# Patient Record
Sex: MALE | Race: WHITE | NOT HISPANIC OR LATINO | Employment: UNEMPLOYED | URBAN - METROPOLITAN AREA
[De-identification: names, ages, dates, MRNs, and addresses within clinical notes are randomized per-mention and may not be internally consistent; named-entity substitution may affect disease eponyms.]

---
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
## 2020-01-02 ENCOUNTER — PATIENT MESSAGE (OUTPATIENT)
Dept: PEDIATRIC CARDIOLOGY | Facility: CLINIC | Age: 20
End: 2020-01-02

## 2020-01-02 RX ORDER — ATENOLOL 25 MG/1
25 TABLET ORAL 2 TIMES DAILY
Qty: 60 TABLET | Refills: 11 | Status: SHIPPED | OUTPATIENT
Start: 2020-01-02 | End: 2020-12-15 | Stop reason: SDUPTHER

## 2020-03-19 ENCOUNTER — PATIENT MESSAGE (OUTPATIENT)
Dept: PEDIATRIC CARDIOLOGY | Facility: CLINIC | Age: 20
End: 2020-03-19

## 2020-04-03 RX ORDER — PRAVASTATIN SODIUM 20 MG/1
20 TABLET ORAL DAILY
Qty: 30 TABLET | Refills: 11 | Status: SHIPPED | OUTPATIENT
Start: 2020-04-03 | End: 2020-10-30

## 2020-05-04 ENCOUNTER — PATIENT MESSAGE (OUTPATIENT)
Dept: PEDIATRIC CARDIOLOGY | Facility: CLINIC | Age: 20
End: 2020-05-04

## 2020-05-06 ENCOUNTER — CLINICAL SUPPORT (OUTPATIENT)
Dept: PEDIATRIC CARDIOLOGY | Facility: CLINIC | Age: 20
End: 2020-05-06
Payer: MEDICAID

## 2020-05-06 ENCOUNTER — PATIENT MESSAGE (OUTPATIENT)
Dept: PEDIATRIC CARDIOLOGY | Facility: CLINIC | Age: 20
End: 2020-05-06

## 2020-05-06 ENCOUNTER — OFFICE VISIT (OUTPATIENT)
Dept: PEDIATRIC CARDIOLOGY | Facility: CLINIC | Age: 20
End: 2020-05-06
Payer: MEDICAID

## 2020-05-06 VITALS
HEART RATE: 88 BPM | SYSTOLIC BLOOD PRESSURE: 120 MMHG | DIASTOLIC BLOOD PRESSURE: 63 MMHG | HEIGHT: 64 IN | BODY MASS INDEX: 27.66 KG/M2 | OXYGEN SATURATION: 97 % | WEIGHT: 162 LBS

## 2020-05-06 DIAGNOSIS — Z94.1 HEART TRANSPLANTED: ICD-10-CM

## 2020-05-06 DIAGNOSIS — Z94.1 STATUS POST HEART TRANSPLANTATION: Primary | ICD-10-CM

## 2020-05-06 DIAGNOSIS — I10 ESSENTIAL HYPERTENSION: ICD-10-CM

## 2020-05-06 PROCEDURE — 99213 OFFICE O/P EST LOW 20 MIN: CPT | Mod: 95,,, | Performed by: PEDIATRICS

## 2020-05-06 PROCEDURE — 99213 PR OFFICE/OUTPT VISIT, EST, LEVL III, 20-29 MIN: ICD-10-PCS | Mod: 95,,, | Performed by: PEDIATRICS

## 2020-05-06 NOTE — PROGRESS NOTES
The patient location is: home  The chief complaint leading to consultation is: heart transplant  Visit type: audiovisual  Total time spent with patient: 15 minutes  Each patient to whom he or she provides medical services by telemedicine is:  (1) informed of the relationship between the physician and patient and the respective role of any other health care provider with respect to management of the patient; and (2) notified that he or she may decline to receive medical services by telemedicine and may withdraw from such care at any time.    Notes:   Wilmer Aponte is a 19 y.o. male with the following diagnoses:  1. s/p OHT secondary to DCM (donor CMV+/recipient -) 9/29/14.  BiVAD [place] support for 6 days before transplant.  ECMO for 1 day prior to [place].              - clotrimazole devang to help stabilize tacro levels in the past, but stopped due to noncompliance              - typically get labs drawn at [facility]  2. Rejection history:              - 2R treated with steroid pulse 10/23/14 followed by repeat 2R treated with ATGAM and 3 days of solumedrol 11/12/14              - 1R on biopsies 1/8/15, 2/23/15, 4/13/15, 10/22/15, 8/2/16              - Grade 0 biopsy with normal coronary angiography March 2017, March 2019  3. hypertension  4. History of ventricular ectopy (NSVT) 10/15/14  5. Extreme anxiety regarding oral medications with oral aversion, hiding medications, and poor hygiene - much improved  6. PEG tube now removed  7. Hyperlipidemia - much improved back on pravastatin  8. EBV PCR negative 12/2018, CMV negative 3/2019    Plan:  1.  The patient needs repeat blood work.  CBC, CMP, tacrolimus, mycophenolic acid, fasting lipid profile, EBV and CMV PCR, hemoglobin A1c.  I would also like to check a vitamin-D level.  They will get them drawn next month at [facility].  2.  Patient needs a yearly dermatology evaluation.  3.  Patient needs to see the dentist regularly.  4.  Provided the blood  work looks good, I can see him again in my [place] clinic in about 4 months.  5.  Plan repeat cardiac catheterization with biopsy and coronary angiography around March 2021.    Discussion:  Overall, he looks great.  I recommended increased fluid intake.  His heart looks good.  I understand his mother's hesitancy to take him for blood work, but it has been 6 months.  We compromised.  She will get it drawn next month.    Interval history:  I last saw him in November 2019.  He has done well from a cardiac standpoint since that time.  No chest pain, palpitations, syncope, shortness of breath, cyanosis, or edema.  No fever.  No lymphadenopathy.  No rashes.  No diarrhea.  He did try to get a part-time job.  However, after standing for a few hours, he would feel weak and dizzy.  He would have a lot of knee pain.    He had vital signs today at the time of his echocardiogram.  Blood pressure 123/63.  Weight 73 kg.  Height 173 cm.    I personally reviewed all the images from his echocardiogram today.  Excellent biventricular function is noted.  There is no significant mitral insufficiency.  There is no pericardial effusion.  The echo was unchanged.    He had blood work November 2019.  MPA level 2.  Tacrolimus level 8.3.  His hemoglobin was 18.  His creatinine was 0.9.  In July 2019, total cholesterol was 166 with HDL 45, .    Sincerely,        Morteza Renae MD  Pediatric Cardiology  Adult Congenital Heart Disease  Pediatric Heart Failure and Transplantation  [facility] [address] [place]  [address]  [phone]

## 2020-07-06 ENCOUNTER — HISTORICAL (OUTPATIENT)
Dept: ADMINISTRATIVE | Facility: HOSPITAL | Age: 20
End: 2020-07-06

## 2020-07-06 ENCOUNTER — TELEPHONE (OUTPATIENT)
Dept: PEDIATRIC CARDIOLOGY | Facility: CLINIC | Age: 20
End: 2020-07-06

## 2020-07-06 LAB
ALBUMIN SERPL-MCNC: 4.4 GM/DL (ref 3.5–5)
ALBUMIN/GLOB SERPL: 1.3 RATIO (ref 1.1–2)
ALP SERPL-CCNC: 167 UNIT/L
ALT SERPL-CCNC: 41 UNIT/L (ref 0–55)
AST SERPL-CCNC: 25 UNIT/L (ref 5–34)
BILIRUB SERPL-MCNC: 1.1 MG/DL (ref 0.2–1.2)
BILIRUBIN DIRECT+TOT PNL SERPL-MCNC: 0.4 MG/DL (ref 0–0.5)
BILIRUBIN DIRECT+TOT PNL SERPL-MCNC: 0.7 MG/DL (ref 0–0.8)
BUN SERPL-MCNC: 11.3 MG/DL (ref 8.9–20.6)
CALCIUM SERPL-MCNC: 9.7 MG/DL (ref 8.4–10.2)
CHLORIDE SERPL-SCNC: 102 MMOL/L (ref 98–107)
CHOLEST SERPL-MCNC: 164 MG/DL
CHOLEST/HDLC SERPL: 4 {RATIO} (ref 0–5)
CO2 SERPL-SCNC: 25 MMOL/L (ref 22–29)
CREAT SERPL-MCNC: 0.8 MG/DL (ref 0.72–1.25)
DEPRECATED CALCIDIOL+CALCIFEROL SERPL-MC: 6.4 NG/ML (ref 6.6–49.9)
EST. AVERAGE GLUCOSE BLD GHB EST-MCNC: <96.8 MG/DL
GLOBULIN SER-MCNC: 3.5 GM/DL (ref 2.4–3.5)
GLUCOSE SERPL-MCNC: 98 MG/DL (ref 74–100)
HBA1C MFR BLD: <5 %
HDLC SERPL-MCNC: 41 MG/DL (ref 40–60)
LDLC SERPL CALC-MCNC: 95 MG/DL (ref 50–140)
POTASSIUM SERPL-SCNC: 4.5 MMOL/L (ref 3.5–5.1)
PROT SERPL-MCNC: 7.9 GM/DL (ref 6.4–8.3)
SODIUM SERPL-SCNC: 137 MMOL/L (ref 136–145)
TRIGL SERPL-MCNC: 142 MG/DL (ref 0–150)
VLDLC SERPL CALC-MCNC: 28 MG/DL

## 2020-07-16 ENCOUNTER — TELEPHONE (OUTPATIENT)
Dept: PEDIATRIC CARDIOLOGY | Facility: CLINIC | Age: 20
End: 2020-07-16

## 2020-07-16 DIAGNOSIS — E55.9 VITAMIN D DEFICIENCY: Primary | ICD-10-CM

## 2020-07-16 RX ORDER — VIT C/E/ZN/COPPR/LUTEIN/ZEAXAN 250MG-90MG
1000 CAPSULE ORAL DAILY
Qty: 30 CAPSULE | Refills: 11 | Status: ON HOLD | OUTPATIENT
Start: 2020-07-16 | End: 2021-07-19 | Stop reason: SDUPTHER

## 2020-07-16 NOTE — TELEPHONE ENCOUNTER
I attempted to call mom.  No answer.  I have communicated with her via my chart.  Blood work was performed July 6, 2020.  To summarize:  1.  Tacrolimus level 8.4, MPA 1.5.  2.  CMV PCR negative.  3.  Normal hemoglobin A1c less than 5  4.  Reassuring lipid values with total cholesterol 161, HDL 41, LDL 95, triglycerides 142.  5.  Normal comprehensive metabolic panel with sodium 137, potassium 4.5, chloride 102, CO2 25, BUN 11, creatinine 0.8, glucose 98.  Bili 1.1.  Albumin 4.4.  AST 25, ALT 41.  6.  Low vitamin D level at 6.4.    Will start vitamin D supplement.

## 2020-09-10 ENCOUNTER — TELEPHONE (OUTPATIENT)
Dept: PEDIATRIC CARDIOLOGY | Facility: CLINIC | Age: 20
End: 2020-09-10

## 2020-10-06 DIAGNOSIS — Z94.1 HEART TRANSPLANTED: Primary | ICD-10-CM

## 2020-10-07 ENCOUNTER — PATIENT MESSAGE (OUTPATIENT)
Dept: PEDIATRIC CARDIOLOGY | Facility: CLINIC | Age: 20
End: 2020-10-07

## 2020-10-08 ENCOUNTER — TELEPHONE (OUTPATIENT)
Dept: PEDIATRIC CARDIOLOGY | Facility: CLINIC | Age: 20
End: 2020-10-08

## 2020-10-08 NOTE — TELEPHONE ENCOUNTER
Patient will see Dr. Renae at 1:30 ( ekg/vs) will get echo after visit at 2:30 if the weather permits. Mom verbalized understanding all information provided.

## 2020-10-11 NOTE — PROGRESS NOTES
10/11/2020    re:Wilmer Aponte  :2000    Ugo Pratt MD  [address] [place] [address]    Dr. Gregg Pugh  [facility]    Pediatric Cardiology Note    Wilmer Aponte is a 20 y.o. male seen today in my pediatric cardiology clinic due to heart transplant.  He is seen in follow up with the following diagnoses:  1. 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  BiVAD [place] support for 6 days before transplant.  ECMO for 1 day prior to [place].   - clotrimazole devang to help stabilize tacro levels in the past, but stopped due to noncompliance   - typically get labs drawn at [facility]  2. Rejection history:   - 2R treated with steroid pulse 10/23/14 followed by repeat 2R treated with ATGAM and 3 days of solumedrol 14   - 1R on biopsies 1/8/15, 2/23/15, 4/13/15, 10/22/15, 16   - Grade 0 biopsy with normal coronary angiography 2017, 2019  3. hypertension  4. History of ventricular ectopy (NSVT) 10/15/14  5. Extreme anxiety regarding oral medications with oral aversion, hiding medications, and poor hygiene - much improved  6. PEG tube now removed  7. Hyperlipidemia - much improved back on pravastatin  8. EBV PCR negative 2018, CMV negative 3/2019  9.     Plan:  1.  Follow-up in 4 months with echo, EKG, labs.  I will see him in [place].    2.  Check MPA and tacrolimus along with fasting lipid levels on Thursday.  3.  Continue pravastatin at current dosing.  4.  No need for endocarditis prophylaxis.  5.  I stressed the need for a healthy diet and regular exercise.  6.  Dermatology appointment yearly.  Dentist every 6 months.  7.  Follow up in 1 week with PCP if cough not better    Discussion:  He looks great clinically.  I will see him again in 4 months.  He is gaining weight, and he needs to eat healthy and exercise.  He does have a cough.  There is no evidence of bacterial infection and his heart looks great.    Interval history:  I saw him  via a virtual visit in July 2020.  I reviewed an echo performed at that time, and it looked great.  Patient was doing well.  Blood work revealed a low vitamin-D level, and he was started on vitamin-D supplement.  Blood work was performed July 6, 2020.  To summarize:  1.  Tacrolimus level 8.4, MPA 1.5.  2.  CMV PCR negative.  3.  Normal hemoglobin A1c less than 5  4.  Reassuring lipid values with total cholesterol 161, HDL 41, LDL 95, triglycerides 142.  5.  Normal comprehensive metabolic panel with sodium 137, potassium 4.5, chloride 102, CO2 25, BUN 11, creatinine 0.8, glucose 98.  Bili 1.1.  Albumin 4.4.  AST 25, ALT 41.  6.  Low vitamin D level at 6.4.    He was seen in the emergency room September 11, 2020 secondary to a cough and upper respiratory congestion.  He was diagnosed with an upper respiratory infection limb and tonsillitis.  He was treated with albuterol and amoxicillin along with a methylprednisolone dose pack.  He felt better, but the cough started back again yesterday.  No fever.  No sore throat.  Nasal congestion.  No diarrhea or vomiting.    The review of systems is as noted above. It is otherwise negative for other symptoms related to the general, neurological, psychiatric, endocrine, gastrointestinal, genitourinary, respiratory, dermatologic, musculoskeletal, hematologic, and immunologic systems.    Past Medical History:   Diagnosis Date    Anxiety     Dilated cardiomyopathy     15 y/o    Heart transplanted     9/29/14 at [place].  Donor CMV+/R-.  [place] BiVAD 9/23-29.    Hypertension     Oral aversion     Status post orthotopic heart transplant      Past Surgical History:   Procedure Laterality Date    EXTRACORPOREAL CIRCULATION      GASTROSTOMY TUBE PLACEMENT      HEART TRANSPLANT      LEFT VENTRICULAR ASSIST DEVICE       Family History   Problem Relation Age of Onset    Diabetes type II Mother     Dilated cardiomyopathy Father     Dilated cardiomyopathy Paternal Uncle      Congenital heart disease Neg Hx     Early death Neg Hx      Social History     Socioeconomic History    Marital status: Single     Spouse name: Not on file    Number of children: Not on file    Years of education: Not on file    Highest education level: Not on file   Occupational History    Not on file   Social Needs    Financial resource strain: Not on file    Food insecurity     Worry: Not on file     Inability: Not on file    Transportation needs     Medical: Not on file     Non-medical: Not on file   Tobacco Use    Smoking status: Never Smoker   Substance and Sexual Activity    Alcohol use: No    Drug use: No    Sexual activity: Never   Lifestyle    Physical activity     Days per week: Not on file     Minutes per session: Not on file    Stress: Not on file   Relationships    Social connections     Talks on phone: Not on file     Gets together: Not on file     Attends Anabaptism service: Not on file     Active member of club or organization: Not on file     Attends meetings of clubs or organizations: Not on file     Relationship status: Not on file   Other Topics Concern    Not on file   Social History Narrative    Lives with family in [facility] [place].     Current Outpatient Medications on File Prior to Visit   Medication Sig Dispense Refill    aspirin 81 MG Chew Take 1 tablet (81 mg total) by mouth once daily. 30 tablet 11    atenolol (TENORMIN) 25 MG tablet Take 1 tablet (25 mg total) by mouth 2 (two) times daily. 60 tablet 11    cholecalciferol, vitamin D3, (VITAMIN D3) 25 mcg (1,000 unit) capsule Take 1 capsule (1,000 Units total) by mouth once daily. 30 capsule 11    mycophenolate (CELLCEPT) 250 mg Cap Take 3 capsules (750 mg total) by mouth 2 (two) times daily. 180 capsule 11    pravastatin (PRAVACHOL) 20 MG tablet Take 1 tablet (20 mg total) by mouth once daily. 30 tablet 11    tacrolimus (PROGRAF) 0.5 MG Cap Take 1 capsule (0.5 mg total) by mouth every 12 (twelve) hours. Take with two  "1mg capsules for total dose of 2.5mg every 12 hours 60 capsule 11    tacrolimus (PROGRAF) 1 MG Cap TAKE 2 CAPSULES BY MOUTH (2 MG. TOTAL) EVERY 12 HOURS 120 capsule 11    atenolol 2 mg/mL oral suspension Take 12.5 mLs (25 mg total) by mouth 2 (two) times daily. 12.5 mls bid (Patient not taking: Reported on 11/11/2019) 150 mL 11    mycophenolate mofetil (CELLCEPT) 200 mg/mL SusR Take 3.75 mLs (750 mg total) by mouth 2 (two) times daily. 3.75MLS BID (Patient not taking: Reported on 11/11/2019) 250 mL 6    ORA-BLEND SF Susp        No current facility-administered medications on file prior to visit.      Review of patient's allergies indicates:   Allergen Reactions    Clindamycin Anaphylaxis         Vitals:    10/12/20 1307   BP: 128/77   BP Location: Right arm   Patient Position: Sitting   BP Method: Medium (Automatic)   Pulse: 90   Resp: 20   SpO2: 97%   Weight: 78.5 kg (173 lb)   Height: 5' 3.78" (1.62 m)       In general, he is an overweight, nondysmorphic male in no apparent distress.  The eyes, nares, and oropharynx are clear.  Eyelids and conjunctiva are normal without drainage or erythema.  2+ tonsils without exudate or erythema.  No obvious dental caries.  Pupils equal and round bilaterally.  The head is normocephalic and atraumatic.  The neck is supple without jugular venous distention or thyroid enlargement.  The lungs are clear to auscultation bilaterally.  There is a well healed median sternotomy.  The first heart sound is normal and the second is fixed and split.  There are no murmurs, gallops, rubs, or clicks in the supine or standing position.  The abdominal exam is benign without hepatosplenomegaly, tenderness, or distention.  The old G tube site is very well healed with no evidence of infection.  Pulses are normal in all 4 extremities with brisk capillary refill and no clubbing, cyanosis, or edema.  No rashes are noted.    I personally reviewed the following tests performed today and my " interpretation follows:  EKG in clinic today is basically normal except for right atrial enlargement.  He has an RSR prime pattern in the right precordial leads that is unchanged.    Echo:  s/p Cardiac transplant.  1. Normal intracardiac anatomy .  2. No mitral insufficiency or pericardial effusion.  3. Normal biventricular size and systolic function.  LV EF estimated at 65%.  4. Normal parameters of left ventricular diastolic function.    I reviewed blood work performed September 11, 2020:  1.  BNP 16, troponin 0.01, creatine phosphokinase mildly elevated at 343  2.  CBC with white count 9.4, hemoglobin 17.7, hematocrit 50.5, platelet count 251.  Normal MCV.  Differential normal.  3.  Comprehensive metabolic panel with sodium 136, potassium 3.8, CO2 21, BUN 7, creatinine 1.1, glucose mildly elevated at 139.  Albumin 4.2.  Total protein 7.4.  Bilirubin 0.6.  AST 27, ALT 37, alkaline phosphatase mildly elevated at 155.    A chest x-ray November 11, 2020 was normal.    In the past, I reviewed extensive medical records from [place].      Sincerely,        Morteza Renae MD  Pediatric Cardiology  Adult Congenital Heart Disease  Pediatric Heart Failure and Transplantation  [facility]  [address] [place]  [address]  [phone]

## 2020-10-12 ENCOUNTER — CLINICAL SUPPORT (OUTPATIENT)
Dept: PEDIATRIC CARDIOLOGY | Facility: CLINIC | Age: 20
End: 2020-10-12
Payer: MEDICAID

## 2020-10-12 ENCOUNTER — OFFICE VISIT (OUTPATIENT)
Dept: PEDIATRIC CARDIOLOGY | Facility: CLINIC | Age: 20
End: 2020-10-12
Payer: MEDICAID

## 2020-10-12 VITALS
RESPIRATION RATE: 20 BRPM | HEART RATE: 90 BPM | SYSTOLIC BLOOD PRESSURE: 128 MMHG | HEIGHT: 64 IN | BODY MASS INDEX: 29.53 KG/M2 | DIASTOLIC BLOOD PRESSURE: 77 MMHG | OXYGEN SATURATION: 97 % | WEIGHT: 173 LBS

## 2020-10-12 DIAGNOSIS — Z94.1 HEART TRANSPLANTED: ICD-10-CM

## 2020-10-12 DIAGNOSIS — I10 ESSENTIAL HYPERTENSION: Primary | ICD-10-CM

## 2020-10-12 PROCEDURE — 93000 ELECTROCARDIOGRAM COMPLETE: CPT | Mod: S$GLB,,, | Performed by: PEDIATRICS

## 2020-10-12 PROCEDURE — 99214 PR OFFICE/OUTPT VISIT, EST, LEVL IV, 30-39 MIN: ICD-10-PCS | Mod: 25,S$GLB,, | Performed by: PEDIATRICS

## 2020-10-12 PROCEDURE — 93000 EKG 12-LEAD PEDIATRIC: ICD-10-PCS | Mod: S$GLB,,, | Performed by: PEDIATRICS

## 2020-10-12 PROCEDURE — 99214 OFFICE O/P EST MOD 30 MIN: CPT | Mod: 25,S$GLB,, | Performed by: PEDIATRICS

## 2020-10-19 ENCOUNTER — HISTORICAL (OUTPATIENT)
Dept: LAB | Facility: HOSPITAL | Age: 20
End: 2020-10-19

## 2020-10-19 LAB
CHOLEST SERPL-MCNC: 193 MG/DL
CHOLEST/HDLC SERPL: 5 {RATIO} (ref 0–5)
HDLC SERPL-MCNC: 41 MG/DL (ref 40–60)
LDLC SERPL CALC-MCNC: 110 MG/DL (ref 50–140)
TRIGL SERPL-MCNC: 210 MG/DL (ref 0–150)
VLDLC SERPL CALC-MCNC: 42 MG/DL

## 2020-10-30 ENCOUNTER — PATIENT MESSAGE (OUTPATIENT)
Dept: PEDIATRIC CARDIOLOGY | Facility: CLINIC | Age: 20
End: 2020-10-30

## 2020-10-30 ENCOUNTER — TELEPHONE (OUTPATIENT)
Dept: PEDIATRIC CARDIOLOGY | Facility: CLINIC | Age: 20
End: 2020-10-30

## 2020-10-30 RX ORDER — MYCOPHENOLATE MOFETIL 250 MG/1
1000 CAPSULE ORAL 2 TIMES DAILY
Qty: 240 CAPSULE | Refills: 11 | Status: SHIPPED | OUTPATIENT
Start: 2020-10-30 | End: 2021-03-10

## 2020-10-30 RX ORDER — PRAVASTATIN SODIUM 40 MG/1
40 TABLET ORAL DAILY
Qty: 30 TABLET | Refills: 11 | Status: ON HOLD | OUTPATIENT
Start: 2020-10-30 | End: 2021-06-25 | Stop reason: SDUPTHER

## 2020-10-30 NOTE — TELEPHONE ENCOUNTER
Patient had blood work performed October 19, 2000 8:34 a.m..  Total cholesterol 193.  HDL 41, .  Triglycerides 210.  Tacrolimus level 7.6.  Mycophenolic acid level 0.6.  Plan:  1.  Increase CellCept dose to 1000 mg twice a day  2.  Increase pravastatin dose to 40 mg daily    I spoke with the mother.  She understands the plan.

## 2020-12-15 RX ORDER — ATENOLOL 25 MG/1
25 TABLET ORAL 2 TIMES DAILY
Qty: 60 TABLET | Refills: 11 | Status: ON HOLD | OUTPATIENT
Start: 2020-12-15 | End: 2021-06-25 | Stop reason: HOSPADM

## 2021-01-08 ENCOUNTER — PATIENT MESSAGE (OUTPATIENT)
Dept: TRANSPLANT | Facility: CLINIC | Age: 21
End: 2021-01-08

## 2021-02-24 ENCOUNTER — CLINICAL SUPPORT (OUTPATIENT)
Dept: PEDIATRIC CARDIOLOGY | Facility: CLINIC | Age: 21
End: 2021-02-24
Payer: MEDICAID

## 2021-02-24 VITALS
SYSTOLIC BLOOD PRESSURE: 124 MMHG | OXYGEN SATURATION: 99 % | WEIGHT: 164.88 LBS | DIASTOLIC BLOOD PRESSURE: 73 MMHG | RESPIRATION RATE: 18 BRPM | BODY MASS INDEX: 28.15 KG/M2 | HEART RATE: 103 BPM | HEIGHT: 64 IN

## 2021-02-24 DIAGNOSIS — Z94.1 HEART TRANSPLANTED: ICD-10-CM

## 2021-02-24 DIAGNOSIS — Z94.1 HEART TRANSPLANTED: Primary | ICD-10-CM

## 2021-03-03 ENCOUNTER — HISTORICAL (OUTPATIENT)
Dept: LAB | Facility: HOSPITAL | Age: 21
End: 2021-03-03

## 2021-03-03 DIAGNOSIS — Z94.1 HEART TRANSPLANTED: Primary | ICD-10-CM

## 2021-03-03 LAB
ALBUMIN SERPL-MCNC: 4.4 GM/DL (ref 3.5–5)
ALBUMIN/GLOB SERPL: 1.2 RATIO (ref 1.1–2)
ALP SERPL-CCNC: 143 UNIT/L (ref 40–150)
ALT SERPL-CCNC: 32 UNIT/L (ref 0–55)
AST SERPL-CCNC: 24 UNIT/L (ref 5–34)
BILIRUB SERPL-MCNC: 1.2 MG/DL (ref 0.2–1.2)
BILIRUBIN DIRECT+TOT PNL SERPL-MCNC: 0.4 MG/DL (ref 0–0.5)
BILIRUBIN DIRECT+TOT PNL SERPL-MCNC: 0.8 MG/DL (ref 0–0.8)
BUN SERPL-MCNC: 13 MG/DL (ref 8.9–20.6)
CALCIUM SERPL-MCNC: 9.5 MG/DL (ref 8.4–10.2)
CHLORIDE SERPL-SCNC: 102 MMOL/L (ref 98–107)
CHOLEST SERPL-MCNC: 168 MG/DL
CHOLEST/HDLC SERPL: 5 {RATIO} (ref 0–5)
CO2 SERPL-SCNC: 23 MMOL/L (ref 22–29)
CREAT SERPL-MCNC: 0.85 MG/DL (ref 0.72–1.25)
GLOBULIN SER-MCNC: 3.6 GM/DL (ref 2.4–3.5)
GLUCOSE SERPL-MCNC: 94 MG/DL (ref 74–100)
HDLC SERPL-MCNC: 35 MG/DL (ref 40–60)
LDLC SERPL CALC-MCNC: 105 MG/DL (ref 50–140)
POTASSIUM SERPL-SCNC: 3.9 MMOL/L (ref 3.5–5.1)
PROT SERPL-MCNC: 8 GM/DL (ref 6.4–8.3)
SODIUM SERPL-SCNC: 135 MMOL/L (ref 136–145)
TRIGL SERPL-MCNC: 142 MG/DL (ref 0–150)
VLDLC SERPL CALC-MCNC: 28 MG/DL

## 2021-03-04 DIAGNOSIS — Z94.1 HEART TRANSPLANTED: Primary | ICD-10-CM

## 2021-03-08 ENCOUNTER — CLINICAL SUPPORT (OUTPATIENT)
Dept: PEDIATRIC CARDIOLOGY | Facility: CLINIC | Age: 21
End: 2021-03-08
Attending: PEDIATRICS
Payer: MEDICAID

## 2021-03-08 ENCOUNTER — OFFICE VISIT (OUTPATIENT)
Dept: PEDIATRIC CARDIOLOGY | Facility: CLINIC | Age: 21
End: 2021-03-08
Payer: MEDICAID

## 2021-03-08 VITALS
HEIGHT: 64 IN | DIASTOLIC BLOOD PRESSURE: 81 MMHG | HEART RATE: 86 BPM | SYSTOLIC BLOOD PRESSURE: 125 MMHG | WEIGHT: 164.88 LBS | OXYGEN SATURATION: 97 % | RESPIRATION RATE: 18 BRPM | BODY MASS INDEX: 28.15 KG/M2

## 2021-03-08 DIAGNOSIS — I10 ESSENTIAL HYPERTENSION: Primary | ICD-10-CM

## 2021-03-08 DIAGNOSIS — R00.2 PALPITATIONS: ICD-10-CM

## 2021-03-08 DIAGNOSIS — Z94.1 HEART TRANSPLANTED: ICD-10-CM

## 2021-03-08 DIAGNOSIS — E78.49 OTHER HYPERLIPIDEMIA: ICD-10-CM

## 2021-03-08 PROCEDURE — 93000 EKG 12-LEAD PEDIATRIC: ICD-10-PCS | Mod: S$GLB,,, | Performed by: PEDIATRICS

## 2021-03-08 PROCEDURE — 99214 PR OFFICE/OUTPT VISIT, EST, LEVL IV, 30-39 MIN: ICD-10-PCS | Mod: 25,S$GLB,, | Performed by: PEDIATRICS

## 2021-03-08 PROCEDURE — 99214 OFFICE O/P EST MOD 30 MIN: CPT | Mod: 25,S$GLB,, | Performed by: PEDIATRICS

## 2021-03-08 PROCEDURE — 93000 ELECTROCARDIOGRAM COMPLETE: CPT | Mod: S$GLB,,, | Performed by: PEDIATRICS

## 2021-03-09 ENCOUNTER — TELEPHONE (OUTPATIENT)
Dept: PEDIATRIC CARDIOLOGY | Facility: CLINIC | Age: 21
End: 2021-03-09

## 2021-03-09 ENCOUNTER — PATIENT MESSAGE (OUTPATIENT)
Dept: PEDIATRIC CARDIOLOGY | Facility: CLINIC | Age: 21
End: 2021-03-09

## 2021-03-10 ENCOUNTER — TELEPHONE (OUTPATIENT)
Dept: PEDIATRIC CARDIOLOGY | Facility: CLINIC | Age: 21
End: 2021-03-10

## 2021-03-10 RX ORDER — MYCOPHENOLATE MOFETIL 250 MG/1
1500 CAPSULE ORAL 2 TIMES DAILY
Qty: 240 CAPSULE | Refills: 11 | Status: ON HOLD | OUTPATIENT
Start: 2021-03-10 | End: 2021-06-21 | Stop reason: HOSPADM

## 2021-03-24 ENCOUNTER — PATIENT MESSAGE (OUTPATIENT)
Dept: PEDIATRIC CARDIOLOGY | Facility: CLINIC | Age: 21
End: 2021-03-24

## 2021-04-18 ENCOUNTER — PATIENT MESSAGE (OUTPATIENT)
Dept: MEDSURG UNIT | Facility: HOSPITAL | Age: 21
End: 2021-04-18

## 2021-04-18 ENCOUNTER — PATIENT MESSAGE (OUTPATIENT)
Dept: PEDIATRIC CARDIOLOGY | Facility: CLINIC | Age: 21
End: 2021-04-18

## 2021-04-19 ENCOUNTER — PATIENT MESSAGE (OUTPATIENT)
Dept: PEDIATRIC CARDIOLOGY | Facility: CLINIC | Age: 21
End: 2021-04-19

## 2021-04-21 ENCOUNTER — PATIENT MESSAGE (OUTPATIENT)
Dept: PEDIATRIC CARDIOLOGY | Facility: CLINIC | Age: 21
End: 2021-04-21

## 2021-04-22 ENCOUNTER — HOSPITAL ENCOUNTER (OUTPATIENT)
Facility: HOSPITAL | Age: 21
Discharge: HOME | End: 2021-04-22
Attending: PEDIATRICS | Admitting: PEDIATRICS
Payer: MEDICAID

## 2021-04-22 ENCOUNTER — ANESTHESIA EVENT (OUTPATIENT)
Dept: MEDSURG UNIT | Facility: HOSPITAL | Age: 21
End: 2021-04-22
Payer: MEDICAID

## 2021-04-22 ENCOUNTER — ANESTHESIA (OUTPATIENT)
Dept: MEDSURG UNIT | Facility: HOSPITAL | Age: 21
End: 2021-04-22
Payer: MEDICAID

## 2021-04-22 VITALS
WEIGHT: 165 LBS | TEMPERATURE: 98 F | HEIGHT: 63 IN | RESPIRATION RATE: 20 BRPM | BODY MASS INDEX: 29.23 KG/M2 | HEART RATE: 115 BPM | DIASTOLIC BLOOD PRESSURE: 70 MMHG | SYSTOLIC BLOOD PRESSURE: 125 MMHG | OXYGEN SATURATION: 96 %

## 2021-04-22 DIAGNOSIS — Z94.1 S/P ORTHOTOPIC HEART TRANSPLANT: ICD-10-CM

## 2021-04-22 DIAGNOSIS — Z94.89 TRANSPLANT RECIPIENT: ICD-10-CM

## 2021-04-22 DIAGNOSIS — Z94.1 HEART TRANSPLANTED: Primary | ICD-10-CM

## 2021-04-22 LAB
25(OH)D3+25(OH)D2 SERPL-MCNC: 8 NG/ML (ref 30–96)
ABO + RH BLD: NORMAL
BASOPHILS # BLD AUTO: 0.03 K/UL (ref 0–0.2)
BASOPHILS NFR BLD: 0.5 % (ref 0–1.9)
BLD GP AB SCN CELLS X3 SERPL QL: NORMAL
DIFFERENTIAL METHOD: ABNORMAL
EOSINOPHIL # BLD AUTO: 0.4 K/UL (ref 0–0.5)
EOSINOPHIL NFR BLD: 5.7 % (ref 0–8)
ERYTHROCYTE [DISTWIDTH] IN BLOOD BY AUTOMATED COUNT: 12.8 % (ref 11.5–14.5)
ESTIMATED AVG GLUCOSE: 100 MG/DL (ref 68–131)
HBA1C MFR BLD: 5.1 % (ref 4–5.6)
HCT VFR BLD AUTO: 49.4 % (ref 40–54)
HGB BLD-MCNC: 17 G/DL (ref 14–18)
IMM GRANULOCYTES # BLD AUTO: 0.01 K/UL (ref 0–0.04)
IMM GRANULOCYTES NFR BLD AUTO: 0.2 % (ref 0–0.5)
LYMPHOCYTES # BLD AUTO: 2 K/UL (ref 1–4.8)
LYMPHOCYTES NFR BLD: 30.5 % (ref 18–48)
MCH RBC QN AUTO: 27.9 PG (ref 27–31)
MCHC RBC AUTO-ENTMCNC: 34.4 G/DL (ref 32–36)
MCV RBC AUTO: 81 FL (ref 82–98)
MONOCYTES # BLD AUTO: 0.7 K/UL (ref 0.3–1)
MONOCYTES NFR BLD: 10.4 % (ref 4–15)
NEUTROPHILS # BLD AUTO: 3.4 K/UL (ref 1.8–7.7)
NEUTROPHILS NFR BLD: 52.7 % (ref 38–73)
NRBC BLD-RTO: 0 /100 WBC
PLATELET # BLD AUTO: 195 K/UL (ref 150–450)
PMV BLD AUTO: 10.7 FL (ref 9.2–12.9)
RBC # BLD AUTO: 6.1 M/UL (ref 4.6–6.2)
SARS-COV-2 RDRP RESP QL NAA+PROBE: NEGATIVE
WBC # BLD AUTO: 6.46 K/UL (ref 3.9–12.7)

## 2021-04-22 PROCEDURE — 93505 ENDOMYOCARDIAL BIOPSY: CPT | Performed by: PEDIATRICS

## 2021-04-22 PROCEDURE — 37000009 HC ANESTHESIA EA ADD 15 MINS: Performed by: PEDIATRICS

## 2021-04-22 PROCEDURE — D9220A PRA ANESTHESIA: ICD-10-PCS | Mod: ANES,,, | Performed by: ANESTHESIOLOGY

## 2021-04-22 PROCEDURE — 63600175 PHARM REV CODE 636 W HCPCS: Performed by: NURSE ANESTHETIST, CERTIFIED REGISTERED

## 2021-04-22 PROCEDURE — 93304 ECHO TRANSTHORACIC: CPT | Performed by: PEDIATRICS

## 2021-04-22 PROCEDURE — 82947 ASSAY GLUCOSE BLOOD QUANT: CPT | Performed by: PEDIATRICS

## 2021-04-22 PROCEDURE — 82306 VITAMIN D 25 HYDROXY: CPT | Performed by: PEDIATRICS

## 2021-04-22 PROCEDURE — 87799 DETECT AGENT NOS DNA QUANT: CPT | Performed by: PEDIATRICS

## 2021-04-22 PROCEDURE — 82330 ASSAY OF CALCIUM: CPT | Performed by: PEDIATRICS

## 2021-04-22 PROCEDURE — 01922 ANES N-INVAS IMG/RADJ THER: CPT | Performed by: PEDIATRICS

## 2021-04-22 PROCEDURE — 88346 IMFLUOR 1ST 1ANTB STAIN PX: CPT | Mod: 26,,, | Performed by: PATHOLOGY

## 2021-04-22 PROCEDURE — 36415 COLL VENOUS BLD VENIPUNCTURE: CPT | Performed by: PEDIATRICS

## 2021-04-22 PROCEDURE — C1894 INTRO/SHEATH, NON-LASER: HCPCS | Performed by: PEDIATRICS

## 2021-04-22 PROCEDURE — 93505 PR BIOPSY OF HEART LINING: ICD-10-PCS | Mod: 26,51,, | Performed by: PEDIATRICS

## 2021-04-22 PROCEDURE — 93460 R&L HRT ART/VENTRICLE ANGIO: CPT | Mod: 26,,, | Performed by: PEDIATRICS

## 2021-04-22 PROCEDURE — 88342 IMHCHEM/IMCYTCHM 1ST ANTB: CPT | Mod: 26,,, | Performed by: PATHOLOGY

## 2021-04-22 PROCEDURE — D9220A PRA ANESTHESIA: Mod: ANES,,, | Performed by: ANESTHESIOLOGY

## 2021-04-22 PROCEDURE — 82805 BLOOD GASES W/O2 SATURATION: CPT | Performed by: PEDIATRICS

## 2021-04-22 PROCEDURE — 25000003 PHARM REV CODE 250: Performed by: PEDIATRICS

## 2021-04-22 PROCEDURE — U0002 COVID-19 LAB TEST NON-CDC: HCPCS | Performed by: PEDIATRICS

## 2021-04-22 PROCEDURE — 88307 TISSUE EXAM BY PATHOLOGIST: CPT | Mod: 26,,, | Performed by: PATHOLOGY

## 2021-04-22 PROCEDURE — 99499 UNLISTED E&M SERVICE: CPT | Mod: ,,, | Performed by: PEDIATRICS

## 2021-04-22 PROCEDURE — D9220A PRA ANESTHESIA: Mod: CRNA,,, | Performed by: NURSE ANESTHETIST, CERTIFIED REGISTERED

## 2021-04-22 PROCEDURE — 93460 PR CATH PLACE/CORON ANGIO, IMG SUPER/INTERP,R&L HRT CATH, L HRT VENTRIC: ICD-10-PCS | Mod: 26,,, | Performed by: PEDIATRICS

## 2021-04-22 PROCEDURE — 88346 IMFLUOR 1ST 1ANTB STAIN PX: CPT | Performed by: PATHOLOGY

## 2021-04-22 PROCEDURE — 27201423 OPTIME MED/SURG SUP & DEVICES STERILE SUPPLY: Performed by: PEDIATRICS

## 2021-04-22 PROCEDURE — 88307 TISSUE EXAM BY PATHOLOGIST: CPT | Performed by: PATHOLOGY

## 2021-04-22 PROCEDURE — 93325 DOPPLER ECHO COLOR FLOW MAPG: CPT | Performed by: PEDIATRICS

## 2021-04-22 PROCEDURE — 93010 ELECTROCARDIOGRAM REPORT: CPT | Mod: ,,, | Performed by: PEDIATRICS

## 2021-04-22 PROCEDURE — 93010 EKG 12-LEAD: ICD-10-PCS | Mod: ,,, | Performed by: PEDIATRICS

## 2021-04-22 PROCEDURE — D9220A PRA ANESTHESIA: ICD-10-PCS | Mod: CRNA,,, | Performed by: NURSE ANESTHETIST, CERTIFIED REGISTERED

## 2021-04-22 PROCEDURE — 99499 NO LOS: ICD-10-PCS | Mod: ,,, | Performed by: PEDIATRICS

## 2021-04-22 PROCEDURE — C1751 CATH, INF, PER/CENT/MIDLINE: HCPCS | Performed by: PEDIATRICS

## 2021-04-22 PROCEDURE — 88342 IMHCHEM/IMCYTCHM 1ST ANTB: CPT | Performed by: PATHOLOGY

## 2021-04-22 PROCEDURE — 37000008 HC ANESTHESIA 1ST 15 MINUTES: Performed by: PEDIATRICS

## 2021-04-22 PROCEDURE — 88342 CHG IMMUNOCYTOCHEMISTRY: ICD-10-PCS | Mod: 26,,, | Performed by: PATHOLOGY

## 2021-04-22 PROCEDURE — 86900 BLOOD TYPING SEROLOGIC ABO: CPT | Mod: 91 | Performed by: PEDIATRICS

## 2021-04-22 PROCEDURE — 86833 HLA CLASS II HIGH DEFIN QUAL: CPT | Mod: PO | Performed by: PEDIATRICS

## 2021-04-22 PROCEDURE — 86977 RBC SERUM PRETX INCUBJ/INHIB: CPT | Mod: PO | Performed by: PEDIATRICS

## 2021-04-22 PROCEDURE — 83036 HEMOGLOBIN GLYCOSYLATED A1C: CPT | Performed by: PEDIATRICS

## 2021-04-22 PROCEDURE — 84132 ASSAY OF SERUM POTASSIUM: CPT | Performed by: PEDIATRICS

## 2021-04-22 PROCEDURE — 93460 R&L HRT ART/VENTRICLE ANGIO: CPT | Performed by: PEDIATRICS

## 2021-04-22 PROCEDURE — 86900 BLOOD TYPING SEROLOGIC ABO: CPT | Performed by: PEDIATRICS

## 2021-04-22 PROCEDURE — 83605 ASSAY OF LACTIC ACID: CPT | Performed by: PEDIATRICS

## 2021-04-22 PROCEDURE — 93505 ENDOMYOCARDIAL BIOPSY: CPT | Mod: 26,51,, | Performed by: PEDIATRICS

## 2021-04-22 PROCEDURE — 25000003 PHARM REV CODE 250: Performed by: NURSE ANESTHETIST, CERTIFIED REGISTERED

## 2021-04-22 PROCEDURE — 93005 ELECTROCARDIOGRAM TRACING: CPT

## 2021-04-22 PROCEDURE — 88307 PR  SURG PATH,LEVEL V: ICD-10-PCS | Mod: 26,,, | Performed by: PATHOLOGY

## 2021-04-22 PROCEDURE — 86832 HLA CLASS I HIGH DEFIN QUAL: CPT | Mod: PO | Performed by: PEDIATRICS

## 2021-04-22 PROCEDURE — C1769 GUIDE WIRE: HCPCS | Performed by: PEDIATRICS

## 2021-04-22 PROCEDURE — 88346 PR IMMUNOFLUORESCENT ANTB, 1ST STAIN: ICD-10-PCS | Mod: 26,,, | Performed by: PATHOLOGY

## 2021-04-22 PROCEDURE — 25500020 PHARM REV CODE 255: Performed by: PEDIATRICS

## 2021-04-22 PROCEDURE — 85025 COMPLETE CBC W/AUTO DIFF WBC: CPT | Performed by: PEDIATRICS

## 2021-04-22 PROCEDURE — 93321 DOPPLER ECHO F-UP/LMTD STD: CPT | Performed by: PEDIATRICS

## 2021-04-22 RX ORDER — BUPIVACAINE HYDROCHLORIDE 2.5 MG/ML
INJECTION, SOLUTION EPIDURAL; INFILTRATION; INTRACAUDAL
Status: DISCONTINUED | OUTPATIENT
Start: 2021-04-22 | End: 2021-04-22 | Stop reason: HOSPADM

## 2021-04-22 RX ORDER — ONDANSETRON 2 MG/ML
4 INJECTION INTRAMUSCULAR; INTRAVENOUS EVERY 12 HOURS PRN
Status: DISCONTINUED | OUTPATIENT
Start: 2021-04-22 | End: 2021-04-22 | Stop reason: HOSPADM

## 2021-04-22 RX ORDER — SODIUM CHLORIDE 9 MG/ML
INJECTION, SOLUTION INTRAVENOUS CONTINUOUS
Status: DISCONTINUED | OUTPATIENT
Start: 2021-04-22 | End: 2021-04-22

## 2021-04-22 RX ORDER — SODIUM CHLORIDE 0.9 % (FLUSH) 0.9 %
10 SYRINGE (ML) INJECTION
Status: DISCONTINUED | OUTPATIENT
Start: 2021-04-22 | End: 2021-04-22 | Stop reason: HOSPADM

## 2021-04-22 RX ORDER — ACETAMINOPHEN 325 MG/1
650 TABLET ORAL EVERY 4 HOURS PRN
Status: DISCONTINUED | OUTPATIENT
Start: 2021-04-22 | End: 2021-04-22 | Stop reason: HOSPADM

## 2021-04-22 RX ORDER — MIDAZOLAM HYDROCHLORIDE 1 MG/ML
INJECTION, SOLUTION INTRAMUSCULAR; INTRAVENOUS
Status: DISCONTINUED | OUTPATIENT
Start: 2021-04-22 | End: 2021-04-22

## 2021-04-22 RX ORDER — FENTANYL CITRATE 50 UG/ML
INJECTION, SOLUTION INTRAMUSCULAR; INTRAVENOUS
Status: DISCONTINUED | OUTPATIENT
Start: 2021-04-22 | End: 2021-04-22

## 2021-04-22 RX ORDER — FENTANYL CITRATE 50 UG/ML
25 INJECTION, SOLUTION INTRAMUSCULAR; INTRAVENOUS EVERY 5 MIN PRN
Status: DISCONTINUED | OUTPATIENT
Start: 2021-04-22 | End: 2021-04-22 | Stop reason: HOSPADM

## 2021-04-22 RX ORDER — MORPHINE SULFATE 10 MG/ML
2 INJECTION, SOLUTION INTRAMUSCULAR; INTRAVENOUS
Status: DISCONTINUED | OUTPATIENT
Start: 2021-04-22 | End: 2021-04-22 | Stop reason: HOSPADM

## 2021-04-22 RX ORDER — DEXMEDETOMIDINE HYDROCHLORIDE 100 UG/ML
INJECTION, SOLUTION INTRAVENOUS
Status: DISCONTINUED | OUTPATIENT
Start: 2021-04-22 | End: 2021-04-22

## 2021-04-22 RX ADMIN — FENTANYL CITRATE 25 MCG: 50 INJECTION INTRAMUSCULAR; INTRAVENOUS at 10:04

## 2021-04-22 RX ADMIN — MIDAZOLAM 2 MG: 1 INJECTION INTRAMUSCULAR; INTRAVENOUS at 10:04

## 2021-04-22 RX ADMIN — DEXMEDETOMIDINE HYDROCHLORIDE 8 MCG: 100 INJECTION, SOLUTION INTRAVENOUS at 10:04

## 2021-04-22 RX ADMIN — DEXMEDETOMIDINE HYDROCHLORIDE 12 MCG: 100 INJECTION, SOLUTION INTRAVENOUS at 10:04

## 2021-04-22 RX ADMIN — MIDAZOLAM 1 MG: 1 INJECTION INTRAMUSCULAR; INTRAVENOUS at 10:04

## 2021-04-23 ENCOUNTER — CONFERENCE (OUTPATIENT)
Dept: PEDIATRIC CARDIOLOGY | Facility: CLINIC | Age: 21
End: 2021-04-23

## 2021-04-23 LAB
CLASS I ANTIBODY COMMENTS - LUMINEX: NORMAL
CLASS II ANTIBODY COMMENTS - LUMINEX: NORMAL
CMV DNA SERPL NAA+PROBE-ACNC: NORMAL IU/ML
CPRA %: 0
DSA1 TESTING DATE: NORMAL
DSA12 TESTING DATE: NORMAL
DSA2 TESTING DATE: NORMAL
FINAL PATHOLOGIC DIAGNOSIS: NORMAL
GLUCOSE SERPL-MCNC: 109 MG/DL (ref 70–110)
GROSS: NORMAL
HCO3 UR-SCNC: 26.4 MMOL/L (ref 24–28)
HCT VFR BLD CALC: 47 %PCV (ref 36–54)
Lab: NORMAL
MICROSCOPIC EXAM: NORMAL
PCO2 BLDA: 49 MMHG (ref 35–45)
PH SMN: 7.34 [PH] (ref 7.35–7.45)
PO2 BLDA: 200 MMHG (ref 80–100)
POC BE: 1 MMOL/L
POC IONIZED CALCIUM: 1.24 MMOL/L (ref 1.06–1.42)
POC SATURATED O2: 100 % (ref 95–100)
POC TCO2: 28 MMOL/L (ref 23–27)
POTASSIUM BLD-SCNC: 4.1 MMOL/L (ref 3.5–5.1)
SAMPLE: ABNORMAL
SERUM COLLECTION DT - LUMINEX CLASS I: NORMAL
SERUM COLLECTION DT - LUMINEX CLASS II: NORMAL
SODIUM BLD-SCNC: 139 MMOL/L (ref 136–145)

## 2021-04-26 LAB — EBV DNA SERPL NAA+PROBE-ACNC: NORMAL IU/ML

## 2021-04-28 ENCOUNTER — TELEPHONE (OUTPATIENT)
Dept: PEDIATRIC CARDIOLOGY | Facility: CLINIC | Age: 21
End: 2021-04-28

## 2021-04-28 RX ORDER — ASPIRIN 325 MG
50000 TABLET, DELAYED RELEASE (ENTERIC COATED) ORAL WEEKLY
Qty: 8 CAPSULE | Refills: 0 | Status: ON HOLD | OUTPATIENT
Start: 2021-04-28 | End: 2021-06-25 | Stop reason: HOSPADM

## 2021-05-12 ENCOUNTER — PATIENT MESSAGE (OUTPATIENT)
Dept: RESEARCH | Facility: HOSPITAL | Age: 21
End: 2021-05-12

## 2021-05-13 ENCOUNTER — PATIENT MESSAGE (OUTPATIENT)
Dept: PEDIATRIC CARDIOLOGY | Facility: CLINIC | Age: 21
End: 2021-05-13

## 2021-05-13 DIAGNOSIS — Z94.1 HEART TRANSPLANTED: Primary | ICD-10-CM

## 2021-06-17 ENCOUNTER — PATIENT MESSAGE (OUTPATIENT)
Dept: PEDIATRIC CARDIOLOGY | Facility: CLINIC | Age: 21
End: 2021-06-17

## 2021-06-17 ENCOUNTER — DOCUMENTATION ONLY (OUTPATIENT)
Dept: PEDIATRIC CARDIOLOGY | Facility: CLINIC | Age: 21
End: 2021-06-17

## 2021-06-17 ENCOUNTER — HOSPITAL ENCOUNTER (INPATIENT)
Facility: HOSPITAL | Age: 21
LOS: 8 days | Discharge: HOME | DRG: 287 | End: 2021-06-25
Attending: INTERNAL MEDICINE | Admitting: INTERNAL MEDICINE
Payer: MEDICAID

## 2021-06-17 DIAGNOSIS — Z94.9 TRANSPLANT: ICD-10-CM

## 2021-06-17 DIAGNOSIS — Z94.1 HEART REPLACED BY TRANSPLANT: ICD-10-CM

## 2021-06-17 DIAGNOSIS — T86.21 ACUTE REJECTION OF CARDIAC TRANSPLANT: ICD-10-CM

## 2021-06-17 DIAGNOSIS — Z94.1 HEART TRANSPLANTED: ICD-10-CM

## 2021-06-17 DIAGNOSIS — T86.21 ANTIBODY MEDIATED REJECTION OF TRANSPLANTED HEART: ICD-10-CM

## 2021-06-17 DIAGNOSIS — I50.9 HEART FAILURE: ICD-10-CM

## 2021-06-17 DIAGNOSIS — Z91.148 NONCOMPLIANCE WITH MEDICATIONS: ICD-10-CM

## 2021-06-17 DIAGNOSIS — T86.21 CARDIAC TRANSPLANT REJECTION: ICD-10-CM

## 2021-06-17 DIAGNOSIS — R94.31 QT PROLONGATION: ICD-10-CM

## 2021-06-17 DIAGNOSIS — I50.9 CHF (CONGESTIVE HEART FAILURE): ICD-10-CM

## 2021-06-17 DIAGNOSIS — Z94.1 S/P ORTHOTOPIC HEART TRANSPLANT: Primary | ICD-10-CM

## 2021-06-17 LAB
25(OH)D3+25(OH)D2 SERPL-MCNC: 12 NG/ML (ref 30–96)
ALBUMIN SERPL BCP-MCNC: 3.3 G/DL (ref 3.5–5.2)
ALLENS TEST: ABNORMAL
ALLENS TEST: ABNORMAL
ALP SERPL-CCNC: 127 U/L (ref 55–135)
ALT SERPL W/O P-5'-P-CCNC: 28 U/L (ref 10–44)
ANION GAP SERPL CALC-SCNC: 15 MMOL/L (ref 8–16)
APTT BLDCRRT: 24.5 SEC (ref 21–32)
AST SERPL-CCNC: 25 U/L (ref 10–40)
BASOPHILS # BLD AUTO: 0.02 K/UL (ref 0–0.2)
BASOPHILS NFR BLD: 0.2 % (ref 0–1.9)
BILIRUB DIRECT SERPL-MCNC: 0.7 MG/DL (ref 0.1–0.3)
BILIRUB SERPL-MCNC: 1.5 MG/DL (ref 0.1–1)
BNP SERPL-MCNC: 1093 PG/ML (ref 0–99)
BUN SERPL-MCNC: 12 MG/DL (ref 6–20)
CALCIUM SERPL-MCNC: 8.4 MG/DL (ref 8.7–10.5)
CHLORIDE SERPL-SCNC: 106 MMOL/L (ref 95–110)
CHOLEST SERPL-MCNC: 146 MG/DL (ref 120–199)
CHOLEST/HDLC SERPL: 5.6 {RATIO} (ref 2–5)
CO2 SERPL-SCNC: 15 MMOL/L (ref 23–29)
CREAT SERPL-MCNC: 1.1 MG/DL (ref 0.5–1.4)
CRP SERPL-MCNC: 46.5 MG/L (ref 0–8.2)
DELSYS: ABNORMAL
DELSYS: ABNORMAL
DIFFERENTIAL METHOD: ABNORMAL
EOSINOPHIL # BLD AUTO: 0.2 K/UL (ref 0–0.5)
EOSINOPHIL NFR BLD: 1.6 % (ref 0–8)
ERYTHROCYTE [DISTWIDTH] IN BLOOD BY AUTOMATED COUNT: 12.9 % (ref 11.5–14.5)
EST. GFR  (AFRICAN AMERICAN): >60 ML/MIN/1.73 M^2
EST. GFR  (NON AFRICAN AMERICAN): >60 ML/MIN/1.73 M^2
GLUCOSE SERPL-MCNC: 143 MG/DL (ref 70–110)
HCO3 UR-SCNC: 23 MMOL/L (ref 24–28)
HCO3 UR-SCNC: 23.3 MMOL/L (ref 24–28)
HCT VFR BLD AUTO: 47.8 % (ref 40–54)
HDLC SERPL-MCNC: 26 MG/DL (ref 40–75)
HDLC SERPL: 17.8 % (ref 20–50)
HGB BLD-MCNC: 16.1 G/DL (ref 14–18)
IMM GRANULOCYTES # BLD AUTO: 0.06 K/UL (ref 0–0.04)
IMM GRANULOCYTES NFR BLD AUTO: 0.6 % (ref 0–0.5)
INR PPP: 1.1 (ref 0.8–1.2)
LACTATE SERPL-SCNC: 4.7 MMOL/L (ref 0.5–2.2)
LDLC SERPL CALC-MCNC: 94.2 MG/DL (ref 63–159)
LYMPHOCYTES # BLD AUTO: 0.7 K/UL (ref 1–4.8)
LYMPHOCYTES NFR BLD: 6.6 % (ref 18–48)
MAGNESIUM SERPL-MCNC: 1.9 MG/DL (ref 1.6–2.6)
MCH RBC QN AUTO: 28.3 PG (ref 27–31)
MCHC RBC AUTO-ENTMCNC: 33.7 G/DL (ref 32–36)
MCV RBC AUTO: 84 FL (ref 82–98)
MODE: ABNORMAL
MODE: ABNORMAL
MONOCYTES # BLD AUTO: 0.2 K/UL (ref 0.3–1)
MONOCYTES NFR BLD: 2.1 % (ref 4–15)
NEUTROPHILS # BLD AUTO: 9.4 K/UL (ref 1.8–7.7)
NEUTROPHILS NFR BLD: 88.9 % (ref 38–73)
NONHDLC SERPL-MCNC: 120 MG/DL
NRBC BLD-RTO: 0 /100 WBC
PCO2 BLDA: 43.7 MMHG (ref 35–45)
PCO2 BLDA: 45.2 MMHG (ref 35–45)
PH SMN: 7.31 [PH] (ref 7.35–7.45)
PH SMN: 7.34 [PH] (ref 7.35–7.45)
PHOSPHATE SERPL-MCNC: 3.7 MG/DL (ref 2.7–4.5)
PLATELET # BLD AUTO: 212 K/UL (ref 150–450)
PMV BLD AUTO: 11.7 FL (ref 9.2–12.9)
PO2 BLDA: 29 MMHG (ref 40–60)
PO2 BLDA: 32 MMHG (ref 40–60)
POC BE: -2 MMOL/L
POC BE: -3 MMOL/L
POC SATURATED O2: 50 % (ref 95–100)
POC SATURATED O2: 56 % (ref 95–100)
POC TCO2: 24 MMOL/L (ref 24–29)
POC TCO2: 25 MMOL/L (ref 24–29)
POTASSIUM SERPL-SCNC: 4.9 MMOL/L (ref 3.5–5.1)
PROCALCITONIN SERPL IA-MCNC: 0.05 NG/ML
PROT SERPL-MCNC: 6.4 G/DL (ref 6–8.4)
PROTHROMBIN TIME: 12.4 SEC (ref 9–12.5)
RBC # BLD AUTO: 5.68 M/UL (ref 4.6–6.2)
SAMPLE: ABNORMAL
SAMPLE: ABNORMAL
SITE: ABNORMAL
SITE: ABNORMAL
SODIUM SERPL-SCNC: 136 MMOL/L (ref 136–145)
SP02: 94
SP02: 95
TRIGL SERPL-MCNC: 129 MG/DL (ref 30–150)
WBC # BLD AUTO: 10.53 K/UL (ref 3.9–12.7)

## 2021-06-17 PROCEDURE — 80048 BASIC METABOLIC PNL TOTAL CA: CPT | Performed by: STUDENT IN AN ORGANIZED HEALTH CARE EDUCATION/TRAINING PROGRAM

## 2021-06-17 PROCEDURE — 80180 DRUG SCRN QUAN MYCOPHENOLATE: CPT | Performed by: STUDENT IN AN ORGANIZED HEALTH CARE EDUCATION/TRAINING PROGRAM

## 2021-06-17 PROCEDURE — 85652 RBC SED RATE AUTOMATED: CPT | Performed by: STUDENT IN AN ORGANIZED HEALTH CARE EDUCATION/TRAINING PROGRAM

## 2021-06-17 PROCEDURE — 82306 VITAMIN D 25 HYDROXY: CPT | Performed by: STUDENT IN AN ORGANIZED HEALTH CARE EDUCATION/TRAINING PROGRAM

## 2021-06-17 PROCEDURE — 82803 BLOOD GASES ANY COMBINATION: CPT

## 2021-06-17 PROCEDURE — 86900 BLOOD TYPING SEROLOGIC ABO: CPT | Performed by: STUDENT IN AN ORGANIZED HEALTH CARE EDUCATION/TRAINING PROGRAM

## 2021-06-17 PROCEDURE — 84484 ASSAY OF TROPONIN QUANT: CPT | Performed by: INTERNAL MEDICINE

## 2021-06-17 PROCEDURE — 84443 ASSAY THYROID STIM HORMONE: CPT | Performed by: STUDENT IN AN ORGANIZED HEALTH CARE EDUCATION/TRAINING PROGRAM

## 2021-06-17 PROCEDURE — 86832 HLA CLASS I HIGH DEFIN QUAL: CPT | Performed by: STUDENT IN AN ORGANIZED HEALTH CARE EDUCATION/TRAINING PROGRAM

## 2021-06-17 PROCEDURE — 83605 ASSAY OF LACTIC ACID: CPT | Performed by: STUDENT IN AN ORGANIZED HEALTH CARE EDUCATION/TRAINING PROGRAM

## 2021-06-17 PROCEDURE — 84100 ASSAY OF PHOSPHORUS: CPT | Performed by: STUDENT IN AN ORGANIZED HEALTH CARE EDUCATION/TRAINING PROGRAM

## 2021-06-17 PROCEDURE — 87040 BLOOD CULTURE FOR BACTERIA: CPT | Mod: 59 | Performed by: STUDENT IN AN ORGANIZED HEALTH CARE EDUCATION/TRAINING PROGRAM

## 2021-06-17 PROCEDURE — 83036 HEMOGLOBIN GLYCOSYLATED A1C: CPT | Performed by: STUDENT IN AN ORGANIZED HEALTH CARE EDUCATION/TRAINING PROGRAM

## 2021-06-17 PROCEDURE — 86833 HLA CLASS II HIGH DEFIN QUAL: CPT | Mod: 59 | Performed by: STUDENT IN AN ORGANIZED HEALTH CARE EDUCATION/TRAINING PROGRAM

## 2021-06-17 PROCEDURE — 80197 ASSAY OF TACROLIMUS: CPT | Performed by: STUDENT IN AN ORGANIZED HEALTH CARE EDUCATION/TRAINING PROGRAM

## 2021-06-17 PROCEDURE — 20000000 HC ICU ROOM

## 2021-06-17 PROCEDURE — 85730 THROMBOPLASTIN TIME PARTIAL: CPT | Performed by: STUDENT IN AN ORGANIZED HEALTH CARE EDUCATION/TRAINING PROGRAM

## 2021-06-17 PROCEDURE — 85025 COMPLETE CBC W/AUTO DIFF WBC: CPT | Performed by: STUDENT IN AN ORGANIZED HEALTH CARE EDUCATION/TRAINING PROGRAM

## 2021-06-17 PROCEDURE — 99900035 HC TECH TIME PER 15 MIN (STAT)

## 2021-06-17 PROCEDURE — 99223 PR INITIAL HOSPITAL CARE,LEVL III: ICD-10-PCS | Mod: ,,, | Performed by: INTERNAL MEDICINE

## 2021-06-17 PROCEDURE — 83880 ASSAY OF NATRIURETIC PEPTIDE: CPT | Performed by: STUDENT IN AN ORGANIZED HEALTH CARE EDUCATION/TRAINING PROGRAM

## 2021-06-17 PROCEDURE — 83735 ASSAY OF MAGNESIUM: CPT | Performed by: STUDENT IN AN ORGANIZED HEALTH CARE EDUCATION/TRAINING PROGRAM

## 2021-06-17 PROCEDURE — 94761 N-INVAS EAR/PLS OXIMETRY MLT: CPT

## 2021-06-17 PROCEDURE — 80074 ACUTE HEPATITIS PANEL: CPT | Performed by: STUDENT IN AN ORGANIZED HEALTH CARE EDUCATION/TRAINING PROGRAM

## 2021-06-17 PROCEDURE — 80076 HEPATIC FUNCTION PANEL: CPT | Performed by: STUDENT IN AN ORGANIZED HEALTH CARE EDUCATION/TRAINING PROGRAM

## 2021-06-17 PROCEDURE — 84145 PROCALCITONIN (PCT): CPT | Performed by: STUDENT IN AN ORGANIZED HEALTH CARE EDUCATION/TRAINING PROGRAM

## 2021-06-17 PROCEDURE — 80061 LIPID PANEL: CPT | Performed by: STUDENT IN AN ORGANIZED HEALTH CARE EDUCATION/TRAINING PROGRAM

## 2021-06-17 PROCEDURE — 86833 HLA CLASS II HIGH DEFIN QUAL: CPT | Performed by: STUDENT IN AN ORGANIZED HEALTH CARE EDUCATION/TRAINING PROGRAM

## 2021-06-17 PROCEDURE — 86977 RBC SERUM PRETX INCUBJ/INHIB: CPT | Mod: 59 | Performed by: STUDENT IN AN ORGANIZED HEALTH CARE EDUCATION/TRAINING PROGRAM

## 2021-06-17 PROCEDURE — 99223 1ST HOSP IP/OBS HIGH 75: CPT | Mod: ,,, | Performed by: INTERNAL MEDICINE

## 2021-06-17 PROCEDURE — 85610 PROTHROMBIN TIME: CPT | Performed by: STUDENT IN AN ORGANIZED HEALTH CARE EDUCATION/TRAINING PROGRAM

## 2021-06-17 PROCEDURE — 86140 C-REACTIVE PROTEIN: CPT | Performed by: STUDENT IN AN ORGANIZED HEALTH CARE EDUCATION/TRAINING PROGRAM

## 2021-06-17 PROCEDURE — 86832 HLA CLASS I HIGH DEFIN QUAL: CPT | Mod: 59 | Performed by: STUDENT IN AN ORGANIZED HEALTH CARE EDUCATION/TRAINING PROGRAM

## 2021-06-17 RX ORDER — OXYCODONE HYDROCHLORIDE 5 MG/1
5 TABLET ORAL EVERY 6 HOURS PRN
Status: DISCONTINUED | OUTPATIENT
Start: 2021-06-17 | End: 2021-06-25 | Stop reason: HOSPADM

## 2021-06-17 RX ORDER — MUPIROCIN 20 MG/G
OINTMENT TOPICAL 2 TIMES DAILY
Status: DISPENSED | OUTPATIENT
Start: 2021-06-17 | End: 2021-06-22

## 2021-06-17 RX ORDER — METHYLPREDNISOLONE SOD SUCC 125 MG
1000 VIAL (EA) INJECTION DAILY
Status: DISCONTINUED | OUTPATIENT
Start: 2021-06-18 | End: 2021-06-17

## 2021-06-17 RX ORDER — MYCOPHENOLATE MOFETIL 250 MG/1
1500 CAPSULE ORAL 2 TIMES DAILY
Status: DISCONTINUED | OUTPATIENT
Start: 2021-06-17 | End: 2021-06-18

## 2021-06-17 RX ORDER — LANOLIN ALCOHOL/MO/W.PET/CERES
800 CREAM (GRAM) TOPICAL
Status: DISCONTINUED | OUTPATIENT
Start: 2021-06-17 | End: 2021-06-25 | Stop reason: HOSPADM

## 2021-06-17 RX ORDER — ATENOLOL 25 MG/1
25 TABLET ORAL 2 TIMES DAILY
Status: DISCONTINUED | OUTPATIENT
Start: 2021-06-17 | End: 2021-06-17

## 2021-06-17 RX ORDER — FUROSEMIDE 10 MG/ML
80 INJECTION INTRAMUSCULAR; INTRAVENOUS
Status: DISCONTINUED | OUTPATIENT
Start: 2021-06-18 | End: 2021-06-20

## 2021-06-17 RX ORDER — PRAVASTATIN SODIUM 40 MG/1
40 TABLET ORAL DAILY
Status: DISCONTINUED | OUTPATIENT
Start: 2021-06-18 | End: 2021-06-18

## 2021-06-17 RX ORDER — TALC
6 POWDER (GRAM) TOPICAL NIGHTLY
Status: DISCONTINUED | OUTPATIENT
Start: 2021-06-17 | End: 2021-06-25 | Stop reason: HOSPADM

## 2021-06-17 RX ORDER — SODIUM,POTASSIUM PHOSPHATES 280-250MG
2 POWDER IN PACKET (EA) ORAL
Status: DISCONTINUED | OUTPATIENT
Start: 2021-06-17 | End: 2021-06-25 | Stop reason: HOSPADM

## 2021-06-17 RX ORDER — ACETAMINOPHEN 325 MG/1
650 TABLET ORAL EVERY 4 HOURS PRN
Status: DISCONTINUED | OUTPATIENT
Start: 2021-06-17 | End: 2021-06-25 | Stop reason: HOSPADM

## 2021-06-17 RX ORDER — NAPROXEN SODIUM 220 MG/1
81 TABLET, FILM COATED ORAL DAILY
Status: DISCONTINUED | OUTPATIENT
Start: 2021-06-18 | End: 2021-06-17

## 2021-06-17 RX ORDER — AMOXICILLIN 250 MG
1 CAPSULE ORAL 2 TIMES DAILY
Status: DISCONTINUED | OUTPATIENT
Start: 2021-06-17 | End: 2021-06-25 | Stop reason: HOSPADM

## 2021-06-17 RX ORDER — TACROLIMUS 1 MG/1
2 CAPSULE ORAL 2 TIMES DAILY
Status: DISCONTINUED | OUTPATIENT
Start: 2021-06-18 | End: 2021-06-18

## 2021-06-17 RX ORDER — ASPIRIN 325 MG
50000 TABLET, DELAYED RELEASE (ENTERIC COATED) ORAL WEEKLY
Status: DISCONTINUED | OUTPATIENT
Start: 2021-06-17 | End: 2021-06-18

## 2021-06-18 ENCOUNTER — PATIENT MESSAGE (OUTPATIENT)
Dept: PEDIATRIC CARDIOLOGY | Facility: CLINIC | Age: 21
End: 2021-06-18

## 2021-06-18 LAB
ABO + RH BLD: NORMAL
ALBUMIN SERPL BCP-MCNC: 3.2 G/DL (ref 3.5–5.2)
ALLENS TEST: ABNORMAL
ALP SERPL-CCNC: 119 U/L (ref 55–135)
ALT SERPL W/O P-5'-P-CCNC: 27 U/L (ref 10–44)
AMPHET+METHAMPHET UR QL: NEGATIVE
ANION GAP SERPL CALC-SCNC: 14 MMOL/L (ref 8–16)
ASCENDING AORTA: 2.54 CM
AST SERPL-CCNC: 22 U/L (ref 10–40)
BARBITURATES UR QL SCN>200 NG/ML: NEGATIVE
BASOPHILS # BLD AUTO: 0.01 K/UL (ref 0–0.2)
BASOPHILS NFR BLD: 0.1 % (ref 0–1.9)
BENZODIAZ UR QL SCN>200 NG/ML: NEGATIVE
BILIRUB DIRECT SERPL-MCNC: 0.6 MG/DL (ref 0.1–0.3)
BILIRUB SERPL-MCNC: 1.2 MG/DL (ref 0.1–1)
BLD GP AB SCN CELLS X3 SERPL QL: NORMAL
BSA FOR ECHO PROCEDURE: 1.84 M2
BUN SERPL-MCNC: 12 MG/DL (ref 6–20)
BZE UR QL SCN: NEGATIVE
CALCIUM SERPL-MCNC: 8.9 MG/DL (ref 8.7–10.5)
CANNABINOIDS UR QL SCN: NEGATIVE
CHLORIDE SERPL-SCNC: 102 MMOL/L (ref 95–110)
CLASS I ANTIBODY COMMENTS - LUMINEX: NORMAL
CLASS II ANTIBODIES - LUMINEX: NORMAL
CLASS II ANTIBODY COMMENTS - LUMINEX: NORMAL
CO2 SERPL-SCNC: 21 MMOL/L (ref 23–29)
CREAT SERPL-MCNC: 1.1 MG/DL (ref 0.5–1.4)
CREAT UR-MCNC: 120 MG/DL (ref 23–375)
CV ECHO LV RWT: 0.47 CM
DELSYS: ABNORMAL
DIFFERENTIAL METHOD: ABNORMAL
DOP CALC LVOT AREA: 3 CM2
DOP CALC LVOT DIAMETER: 1.97 CM
DOP CALC LVOT PEAK VEL: 0.54 M/S
DOP CALC LVOT STROKE VOLUME: 26.87 CM3
DOP CALCLVOT PEAK VEL VTI: 8.82 CM
DSA1 TESTING DATE: NORMAL
DSA12 TESTING DATE: NORMAL
DSA2 TESTING DATE: NORMAL
ECHO LV POSTERIOR WALL: 0.93 CM (ref 0.6–1.1)
EJECTION FRACTION: 50 %
EOSINOPHIL # BLD AUTO: 0.1 K/UL (ref 0–0.5)
EOSINOPHIL NFR BLD: 0.7 % (ref 0–8)
ERYTHROCYTE [DISTWIDTH] IN BLOOD BY AUTOMATED COUNT: 13 % (ref 11.5–14.5)
ERYTHROCYTE [SEDIMENTATION RATE] IN BLOOD BY WESTERGREN METHOD: 13 MM/HR (ref 0–23)
EST. GFR  (AFRICAN AMERICAN): >60 ML/MIN/1.73 M^2
EST. GFR  (NON AFRICAN AMERICAN): >60 ML/MIN/1.73 M^2
ESTIMATED AVG GLUCOSE: 94 MG/DL (ref 68–131)
FLOW: 2
FRACTIONAL SHORTENING: 24 % (ref 28–44)
GLUCOSE SERPL-MCNC: 169 MG/DL (ref 70–110)
HAV IGM SERPL QL IA: NEGATIVE
HBA1C MFR BLD: 4.9 % (ref 4–5.6)
HBV CORE IGM SERPL QL IA: NEGATIVE
HBV SURFACE AG SERPL QL IA: NEGATIVE
HCO3 UR-SCNC: 28.4 MMOL/L (ref 24–28)
HCO3 UR-SCNC: 29.4 MMOL/L (ref 24–28)
HCO3 UR-SCNC: 29.7 MMOL/L (ref 24–28)
HCT VFR BLD AUTO: 45.4 % (ref 40–54)
HCV AB SERPL QL IA: NEGATIVE
HGB BLD-MCNC: 15.2 G/DL (ref 14–18)
IMM GRANULOCYTES # BLD AUTO: 0.05 K/UL (ref 0–0.04)
IMM GRANULOCYTES NFR BLD AUTO: 0.5 % (ref 0–0.5)
INTERVENTRICULAR SEPTUM: 1.11 CM (ref 0.6–1.1)
LACTATE SERPL-SCNC: 1.2 MMOL/L (ref 0.5–2.2)
LACTATE SERPL-SCNC: 1.7 MMOL/L (ref 0.5–2.2)
LACTATE SERPL-SCNC: 1.8 MMOL/L (ref 0.5–2.2)
LACTATE SERPL-SCNC: 2.5 MMOL/L (ref 0.5–2.2)
LEFT INTERNAL DIMENSION IN SYSTOLE: 3 CM (ref 2.1–4)
LEFT VENTRICLE DIASTOLIC VOLUME INDEX: 37.23 ML/M2
LEFT VENTRICLE DIASTOLIC VOLUME: 67.01 ML
LEFT VENTRICLE MASS INDEX: 71 G/M2
LEFT VENTRICLE SYSTOLIC VOLUME INDEX: 19.5 ML/M2
LEFT VENTRICLE SYSTOLIC VOLUME: 35.08 ML
LEFT VENTRICULAR INTERNAL DIMENSION IN DIASTOLE: 3.93 CM (ref 3.5–6)
LEFT VENTRICULAR MASS: 127.13 G
LYMPHOCYTES # BLD AUTO: 0.7 K/UL (ref 1–4.8)
LYMPHOCYTES NFR BLD: 7.9 % (ref 18–48)
MAGNESIUM SERPL-MCNC: 1.9 MG/DL (ref 1.6–2.6)
MCH RBC QN AUTO: 27.6 PG (ref 27–31)
MCHC RBC AUTO-ENTMCNC: 33.5 G/DL (ref 32–36)
MCV RBC AUTO: 82 FL (ref 82–98)
METHADONE UR QL SCN>300 NG/ML: NEGATIVE
MODE: ABNORMAL
MONOCYTES # BLD AUTO: 0.1 K/UL (ref 0.3–1)
MONOCYTES NFR BLD: 1.5 % (ref 4–15)
NEUTROPHILS # BLD AUTO: 8.3 K/UL (ref 1.8–7.7)
NEUTROPHILS NFR BLD: 89.3 % (ref 38–73)
NRBC BLD-RTO: 0 /100 WBC
OPIATES UR QL SCN: NEGATIVE
PCO2 BLDA: 49.7 MMHG (ref 35–45)
PCO2 BLDA: 50 MMHG (ref 35–45)
PCO2 BLDA: 52.4 MMHG (ref 35–45)
PCP UR QL SCN>25 NG/ML: NEGATIVE
PH SMN: 7.36 [PH] (ref 7.35–7.45)
PH SMN: 7.37 [PH] (ref 7.35–7.45)
PH SMN: 7.38 [PH] (ref 7.35–7.45)
PHOSPHATE SERPL-MCNC: 3.4 MG/DL (ref 2.7–4.5)
PISA TR MAX VEL: 1.85 M/S
PLATELET # BLD AUTO: 179 K/UL (ref 150–450)
PMV BLD AUTO: 11.9 FL (ref 9.2–12.9)
PO2 BLDA: 31 MMHG (ref 40–60)
PO2 BLDA: 36 MMHG (ref 40–60)
PO2 BLDA: 38 MMHG (ref 40–60)
POC BE: 3 MMOL/L
POC BE: 4 MMOL/L
POC BE: 5 MMOL/L
POC SATURATED O2: 55 % (ref 95–100)
POC SATURATED O2: 66 % (ref 95–100)
POC SATURATED O2: 70 % (ref 95–100)
POC TCO2: 30 MMOL/L (ref 24–29)
POC TCO2: 31 MMOL/L (ref 24–29)
POC TCO2: 31 MMOL/L (ref 24–29)
POCT GLUCOSE: 175 MG/DL (ref 70–110)
POCT GLUCOSE: 192 MG/DL (ref 70–110)
POTASSIUM SERPL-SCNC: 4.2 MMOL/L (ref 3.5–5.1)
PROT SERPL-MCNC: 6.4 G/DL (ref 6–8.4)
RA PRESSURE: 8 MMHG
RBC # BLD AUTO: 5.51 M/UL (ref 4.6–6.2)
SAMPLE: ABNORMAL
SERUM COLLECTION DT - LUMINEX CLASS I: NORMAL
SERUM COLLECTION DT - LUMINEX CLASS II: NORMAL
SINUS: 2.6 CM
SITE: ABNORMAL
SODIUM SERPL-SCNC: 137 MMOL/L (ref 136–145)
SP02: 96
STJ: 2.5 CM
TACROLIMUS BLD-MCNC: <2 NG/ML (ref 5–15)
TACROLIMUS, NORMALIZED: <2 NG/ML (ref 5–15)
TDI LATERAL: 0.1 M/S
TDI SEPTAL: 0.08 M/S
TDI: 0.09 M/S
TOXICOLOGY INFORMATION: NORMAL
TR MAX PG: 14 MMHG
TRICUSPID ANNULAR PLANE SYSTOLIC EXCURSION: 1.36 CM
TROPONIN I SERPL DL<=0.01 NG/ML-MCNC: 0.19 NG/ML (ref 0–0.03)
TROPONIN I SERPL DL<=0.01 NG/ML-MCNC: 0.2 NG/ML (ref 0–0.03)
TROPONIN I SERPL DL<=0.01 NG/ML-MCNC: 0.26 NG/ML (ref 0–0.03)
TSH SERPL DL<=0.005 MIU/L-ACNC: 0.49 UIU/ML (ref 0.4–4)
TV REST PULMONARY ARTERY PRESSURE: 22 MMHG
WBC # BLD AUTO: 9.35 K/UL (ref 3.9–12.7)

## 2021-06-18 PROCEDURE — 82803 BLOOD GASES ANY COMBINATION: CPT

## 2021-06-18 PROCEDURE — 85025 COMPLETE CBC W/AUTO DIFF WBC: CPT | Performed by: STUDENT IN AN ORGANIZED HEALTH CARE EDUCATION/TRAINING PROGRAM

## 2021-06-18 PROCEDURE — 93010 EKG 12-LEAD: ICD-10-PCS | Mod: ,,, | Performed by: INTERNAL MEDICINE

## 2021-06-18 PROCEDURE — 88346 IMFLUOR 1ST 1ANTB STAIN PX: CPT | Performed by: PATHOLOGY

## 2021-06-18 PROCEDURE — 83605 ASSAY OF LACTIC ACID: CPT | Mod: 91 | Performed by: STUDENT IN AN ORGANIZED HEALTH CARE EDUCATION/TRAINING PROGRAM

## 2021-06-18 PROCEDURE — 88342 CHG IMMUNOCYTOCHEMISTRY: ICD-10-PCS | Mod: 26,,, | Performed by: PATHOLOGY

## 2021-06-18 PROCEDURE — 93505 PR BIOPSY OF HEART LINING: ICD-10-PCS | Mod: 26,GC,, | Performed by: INTERNAL MEDICINE

## 2021-06-18 PROCEDURE — C1894 INTRO/SHEATH, NON-LASER: HCPCS | Performed by: INTERNAL MEDICINE

## 2021-06-18 PROCEDURE — 27000221 HC OXYGEN, UP TO 24 HOURS

## 2021-06-18 PROCEDURE — 93505 ENDOMYOCARDIAL BIOPSY: CPT | Performed by: INTERNAL MEDICINE

## 2021-06-18 PROCEDURE — 99152 MOD SED SAME PHYS/QHP 5/>YRS: CPT | Mod: GC,,, | Performed by: INTERNAL MEDICINE

## 2021-06-18 PROCEDURE — 63600175 PHARM REV CODE 636 W HCPCS: Performed by: INTERNAL MEDICINE

## 2021-06-18 PROCEDURE — 86352 CELL FUNCTION ASSAY W/STIM: CPT | Performed by: STUDENT IN AN ORGANIZED HEALTH CARE EDUCATION/TRAINING PROGRAM

## 2021-06-18 PROCEDURE — 99152 PR MOD CONSCIOUS SEDATION, SAME PHYS, 5+ YRS, FIRST 15 MIN: ICD-10-PCS | Mod: GC,,, | Performed by: INTERNAL MEDICINE

## 2021-06-18 PROCEDURE — 88342 IMHCHEM/IMCYTCHM 1ST ANTB: CPT | Performed by: PATHOLOGY

## 2021-06-18 PROCEDURE — 88341 PR IHC OR ICC EACH ADD'L SINGLE ANTIBODY  STAINPR: ICD-10-PCS | Mod: 26,,, | Performed by: PATHOLOGY

## 2021-06-18 PROCEDURE — 99292 PR CRITICAL CARE, ADDL 30 MIN: ICD-10-PCS | Mod: ,,, | Performed by: INTERNAL MEDICINE

## 2021-06-18 PROCEDURE — 80048 BASIC METABOLIC PNL TOTAL CA: CPT | Performed by: STUDENT IN AN ORGANIZED HEALTH CARE EDUCATION/TRAINING PROGRAM

## 2021-06-18 PROCEDURE — 83735 ASSAY OF MAGNESIUM: CPT | Performed by: STUDENT IN AN ORGANIZED HEALTH CARE EDUCATION/TRAINING PROGRAM

## 2021-06-18 PROCEDURE — 84484 ASSAY OF TROPONIN QUANT: CPT | Performed by: PHYSICIAN ASSISTANT

## 2021-06-18 PROCEDURE — 88307 TISSUE EXAM BY PATHOLOGIST: CPT | Performed by: PATHOLOGY

## 2021-06-18 PROCEDURE — 88342 IMHCHEM/IMCYTCHM 1ST ANTB: CPT | Mod: 26,,, | Performed by: PATHOLOGY

## 2021-06-18 PROCEDURE — 80502 PR  LAB PATHOLOGY CONSULT-COMPLETE: CPT | Mod: ,,, | Performed by: PATHOLOGY

## 2021-06-18 PROCEDURE — 25000003 PHARM REV CODE 250: Performed by: STUDENT IN AN ORGANIZED HEALTH CARE EDUCATION/TRAINING PROGRAM

## 2021-06-18 PROCEDURE — 27201423 OPTIME MED/SURG SUP & DEVICES STERILE SUPPLY: Performed by: INTERNAL MEDICINE

## 2021-06-18 PROCEDURE — 93451 PR RIGHT HEART CATH O2 SATURATION & CARDIAC OUTPUT: ICD-10-PCS | Mod: 26,59,GC, | Performed by: INTERNAL MEDICINE

## 2021-06-18 PROCEDURE — 20000000 HC ICU ROOM

## 2021-06-18 PROCEDURE — 88341 IMHCHEM/IMCYTCHM EA ADD ANTB: CPT | Mod: 59 | Performed by: PATHOLOGY

## 2021-06-18 PROCEDURE — 94761 N-INVAS EAR/PLS OXIMETRY MLT: CPT

## 2021-06-18 PROCEDURE — 88307 PR  SURG PATH,LEVEL V: ICD-10-PCS | Mod: 26,,, | Performed by: PATHOLOGY

## 2021-06-18 PROCEDURE — 80502 PR  LAB PATHOLOGY CONSULT-COMPLETE: ICD-10-PCS | Mod: ,,, | Performed by: PATHOLOGY

## 2021-06-18 PROCEDURE — 80076 HEPATIC FUNCTION PANEL: CPT | Performed by: STUDENT IN AN ORGANIZED HEALTH CARE EDUCATION/TRAINING PROGRAM

## 2021-06-18 PROCEDURE — 93451 RIGHT HEART CATH: CPT | Mod: 59 | Performed by: INTERNAL MEDICINE

## 2021-06-18 PROCEDURE — 99233 SBSQ HOSP IP/OBS HIGH 50: CPT | Mod: ,,, | Performed by: PEDIATRICS

## 2021-06-18 PROCEDURE — 87899 AGENT NOS ASSAY W/OPTIC: CPT | Performed by: STUDENT IN AN ORGANIZED HEALTH CARE EDUCATION/TRAINING PROGRAM

## 2021-06-18 PROCEDURE — 93010 ELECTROCARDIOGRAM REPORT: CPT | Mod: ,,, | Performed by: INTERNAL MEDICINE

## 2021-06-18 PROCEDURE — C1751 CATH, INF, PER/CENT/MIDLINE: HCPCS | Performed by: INTERNAL MEDICINE

## 2021-06-18 PROCEDURE — 25000003 PHARM REV CODE 250: Performed by: INTERNAL MEDICINE

## 2021-06-18 PROCEDURE — 88365 INSITU HYBRIDIZATION (FISH): CPT | Mod: 26,,, | Performed by: PATHOLOGY

## 2021-06-18 PROCEDURE — 63600175 PHARM REV CODE 636 W HCPCS: Performed by: STUDENT IN AN ORGANIZED HEALTH CARE EDUCATION/TRAINING PROGRAM

## 2021-06-18 PROCEDURE — 88341 IMHCHEM/IMCYTCHM EA ADD ANTB: CPT | Mod: 26,,, | Performed by: PATHOLOGY

## 2021-06-18 PROCEDURE — 99233 PR SUBSEQUENT HOSPITAL CARE,LEVL III: ICD-10-PCS | Mod: ,,, | Performed by: PEDIATRICS

## 2021-06-18 PROCEDURE — 88346 IMFLUOR 1ST 1ANTB STAIN PX: CPT | Mod: 26,59,, | Performed by: PATHOLOGY

## 2021-06-18 PROCEDURE — 99291 PR CRITICAL CARE, E/M 30-74 MINUTES: ICD-10-PCS | Mod: ,,, | Performed by: PHYSICIAN ASSISTANT

## 2021-06-18 PROCEDURE — 99292 CRITICAL CARE ADDL 30 MIN: CPT | Mod: ,,, | Performed by: INTERNAL MEDICINE

## 2021-06-18 PROCEDURE — 99291 CRITICAL CARE FIRST HOUR: CPT | Mod: ,,, | Performed by: PHYSICIAN ASSISTANT

## 2021-06-18 PROCEDURE — 99153 MOD SED SAME PHYS/QHP EA: CPT | Performed by: INTERNAL MEDICINE

## 2021-06-18 PROCEDURE — C1769 GUIDE WIRE: HCPCS | Performed by: INTERNAL MEDICINE

## 2021-06-18 PROCEDURE — 93505 ENDOMYOCARDIAL BIOPSY: CPT | Mod: 26,GC,, | Performed by: INTERNAL MEDICINE

## 2021-06-18 PROCEDURE — 25000003 PHARM REV CODE 250

## 2021-06-18 PROCEDURE — 88365 INSITU HYBRIDIZATION (FISH): CPT | Performed by: PATHOLOGY

## 2021-06-18 PROCEDURE — 93005 ELECTROCARDIOGRAM TRACING: CPT

## 2021-06-18 PROCEDURE — 25000003 PHARM REV CODE 250: Performed by: PHYSICIAN ASSISTANT

## 2021-06-18 PROCEDURE — 88365 PR  TISSUE HYBRIDIZATION: ICD-10-PCS | Mod: 26,,, | Performed by: PATHOLOGY

## 2021-06-18 PROCEDURE — 88364 INSITU HYBRIDIZATION (FISH): CPT | Performed by: PATHOLOGY

## 2021-06-18 PROCEDURE — 84100 ASSAY OF PHOSPHORUS: CPT | Performed by: STUDENT IN AN ORGANIZED HEALTH CARE EDUCATION/TRAINING PROGRAM

## 2021-06-18 PROCEDURE — 88307 TISSUE EXAM BY PATHOLOGIST: CPT | Mod: 26,,, | Performed by: PATHOLOGY

## 2021-06-18 PROCEDURE — 93451 RIGHT HEART CATH: CPT | Mod: 26,59,GC, | Performed by: INTERNAL MEDICINE

## 2021-06-18 PROCEDURE — 99152 MOD SED SAME PHYS/QHP 5/>YRS: CPT | Performed by: INTERNAL MEDICINE

## 2021-06-18 PROCEDURE — 80307 DRUG TEST PRSMV CHEM ANLYZR: CPT | Performed by: STUDENT IN AN ORGANIZED HEALTH CARE EDUCATION/TRAINING PROGRAM

## 2021-06-18 PROCEDURE — 99900035 HC TECH TIME PER 15 MIN (STAT)

## 2021-06-18 PROCEDURE — 88364 CHG INSITU HYBRIDIZATION (FISH: ICD-10-PCS | Mod: 26,,, | Performed by: PATHOLOGY

## 2021-06-18 PROCEDURE — 88346 PR IMMUNOFLUORESCENT ANTB, 1ST STAIN: ICD-10-PCS | Mod: 26,59,, | Performed by: PATHOLOGY

## 2021-06-18 PROCEDURE — 88364 INSITU HYBRIDIZATION (FISH): CPT | Mod: 26,,, | Performed by: PATHOLOGY

## 2021-06-18 RX ORDER — ONDANSETRON 8 MG/1
8 TABLET, ORALLY DISINTEGRATING ORAL EVERY 8 HOURS PRN
Status: DISCONTINUED | OUTPATIENT
Start: 2021-06-18 | End: 2021-06-25 | Stop reason: HOSPADM

## 2021-06-18 RX ORDER — ACETAMINOPHEN 650 MG/20.3ML
1000 LIQUID ORAL ONCE
Status: COMPLETED | OUTPATIENT
Start: 2021-06-18 | End: 2021-06-18

## 2021-06-18 RX ORDER — SULFAMETHOXAZOLE AND TRIMETHOPRIM 400; 80 MG/1; MG/1
1 TABLET ORAL DAILY
Status: DISCONTINUED | OUTPATIENT
Start: 2021-06-19 | End: 2021-06-25 | Stop reason: HOSPADM

## 2021-06-18 RX ORDER — DIPHENHYDRAMINE HCL 50 MG
50 CAPSULE ORAL ONCE
Status: DISCONTINUED | OUTPATIENT
Start: 2021-06-18 | End: 2021-06-19

## 2021-06-18 RX ORDER — MIDAZOLAM HYDROCHLORIDE 1 MG/ML
INJECTION, SOLUTION INTRAMUSCULAR; INTRAVENOUS
Status: DISCONTINUED | OUTPATIENT
Start: 2021-06-18 | End: 2021-06-18 | Stop reason: HOSPADM

## 2021-06-18 RX ORDER — ONDANSETRON 2 MG/ML
INJECTION INTRAMUSCULAR; INTRAVENOUS
Status: DISCONTINUED | OUTPATIENT
Start: 2021-06-18 | End: 2021-06-18 | Stop reason: HOSPADM

## 2021-06-18 RX ORDER — LORAZEPAM 2 MG/ML
0.5 INJECTION INTRAMUSCULAR ONCE
Status: COMPLETED | OUTPATIENT
Start: 2021-06-18 | End: 2021-06-18

## 2021-06-18 RX ORDER — HEPARIN SOD,PORCINE/0.9 % NACL 1000/500ML
INTRAVENOUS SOLUTION INTRAVENOUS
Status: DISCONTINUED | OUTPATIENT
Start: 2021-06-18 | End: 2021-06-18 | Stop reason: HOSPADM

## 2021-06-18 RX ORDER — ERGOCALCIFEROL 1.25 MG/1
50000 CAPSULE ORAL
Status: DISCONTINUED | OUTPATIENT
Start: 2021-06-18 | End: 2021-06-25 | Stop reason: HOSPADM

## 2021-06-18 RX ORDER — LIDOCAINE HYDROCHLORIDE 20 MG/ML
INJECTION, SOLUTION INFILTRATION; PERINEURAL
Status: DISCONTINUED | OUTPATIENT
Start: 2021-06-18 | End: 2021-06-18 | Stop reason: HOSPADM

## 2021-06-18 RX ORDER — FENTANYL CITRATE 50 UG/ML
INJECTION, SOLUTION INTRAMUSCULAR; INTRAVENOUS
Status: DISCONTINUED | OUTPATIENT
Start: 2021-06-18 | End: 2021-06-18 | Stop reason: HOSPADM

## 2021-06-18 RX ORDER — SODIUM CHLORIDE 9 MG/ML
INJECTION, SOLUTION INTRAVENOUS CONTINUOUS
Status: DISCONTINUED | OUTPATIENT
Start: 2021-06-18 | End: 2021-06-24

## 2021-06-18 RX ORDER — MYCOPHENOLATE MOFETIL 250 MG/1
1500 CAPSULE ORAL 2 TIMES DAILY
Status: DISCONTINUED | OUTPATIENT
Start: 2021-06-18 | End: 2021-06-18

## 2021-06-18 RX ORDER — INSULIN ASPART 100 [IU]/ML
0-5 INJECTION, SOLUTION INTRAVENOUS; SUBCUTANEOUS
Status: DISCONTINUED | OUTPATIENT
Start: 2021-06-18 | End: 2021-06-21

## 2021-06-18 RX ORDER — IBUPROFEN 200 MG
16 TABLET ORAL
Status: DISCONTINUED | OUTPATIENT
Start: 2021-06-18 | End: 2021-06-25 | Stop reason: HOSPADM

## 2021-06-18 RX ORDER — PREDNISONE 20 MG/1
20 TABLET ORAL 2 TIMES DAILY
Status: DISCONTINUED | OUTPATIENT
Start: 2021-06-20 | End: 2021-06-22

## 2021-06-18 RX ORDER — PRAVASTATIN SODIUM 40 MG/1
40 TABLET ORAL NIGHTLY
Status: DISCONTINUED | OUTPATIENT
Start: 2021-06-18 | End: 2021-06-25 | Stop reason: HOSPADM

## 2021-06-18 RX ORDER — GLUCAGON 1 MG
1 KIT INJECTION
Status: DISCONTINUED | OUTPATIENT
Start: 2021-06-18 | End: 2021-06-25 | Stop reason: HOSPADM

## 2021-06-18 RX ORDER — NYSTATIN 100000 [USP'U]/ML
500000 SUSPENSION ORAL
Status: DISCONTINUED | OUTPATIENT
Start: 2021-06-18 | End: 2021-06-25 | Stop reason: HOSPADM

## 2021-06-18 RX ORDER — LIDOCAINE HYDROCHLORIDE 10 MG/ML
INJECTION INFILTRATION; PERINEURAL
Status: COMPLETED
Start: 2021-06-18 | End: 2021-06-18

## 2021-06-18 RX ORDER — DIPHENHYDRAMINE HCL 12.5MG/5ML
50 ELIXIR ORAL ONCE
Status: DISCONTINUED | OUTPATIENT
Start: 2021-06-18 | End: 2021-06-18

## 2021-06-18 RX ORDER — IBUPROFEN 200 MG
24 TABLET ORAL
Status: DISCONTINUED | OUTPATIENT
Start: 2021-06-18 | End: 2021-06-25 | Stop reason: HOSPADM

## 2021-06-18 RX ADMIN — OXYCODONE HYDROCHLORIDE 5 MG: 5 TABLET ORAL at 09:06

## 2021-06-18 RX ADMIN — NYSTATIN 500000 UNITS: 100000 SUSPENSION ORAL at 09:06

## 2021-06-18 RX ADMIN — MELATONIN TAB 3 MG 6 MG: 3 TAB at 09:06

## 2021-06-18 RX ADMIN — MELATONIN TAB 3 MG 6 MG: 3 TAB at 12:06

## 2021-06-18 RX ADMIN — FUROSEMIDE 80 MG: 10 INJECTION, SOLUTION INTRAMUSCULAR; INTRAVENOUS at 12:06

## 2021-06-18 RX ADMIN — TACROLIMUS 5 MG: 1 CAPSULE, GELATIN COATED ORAL at 05:06

## 2021-06-18 RX ADMIN — TACROLIMUS 2.5 MG: 1 CAPSULE, GELATIN COATED ORAL at 09:06

## 2021-06-18 RX ADMIN — LORAZEPAM 0.5 MG: 2 INJECTION INTRAMUSCULAR; INTRAVENOUS at 07:06

## 2021-06-18 RX ADMIN — ACETAMINOPHEN 999.01 MG: 160 SOLUTION ORAL at 07:06

## 2021-06-18 RX ADMIN — MUPIROCIN: 20 OINTMENT TOPICAL at 09:06

## 2021-06-18 RX ADMIN — DOCUSATE SODIUM 50MG AND SENNOSIDES 8.6MG 1 TABLET: 8.6; 5 TABLET, FILM COATED ORAL at 09:06

## 2021-06-18 RX ADMIN — NYSTATIN 500000 UNITS: 100000 SUSPENSION ORAL at 05:06

## 2021-06-18 RX ADMIN — MYCOPHENOLATE MOFETIL 1500 MG: 500 INJECTION, POWDER, LYOPHILIZED, FOR SOLUTION INTRAVENOUS at 02:06

## 2021-06-18 RX ADMIN — ERGOCALCIFEROL 50000 UNITS: 1.25 CAPSULE ORAL at 12:06

## 2021-06-18 RX ADMIN — MUPIROCIN: 20 OINTMENT TOPICAL at 12:06

## 2021-06-18 RX ADMIN — FUROSEMIDE 80 MG: 10 INJECTION, SOLUTION INTRAMUSCULAR; INTRAVENOUS at 02:06

## 2021-06-18 RX ADMIN — LIDOCAINE HYDROCHLORIDE: 10 INJECTION, SOLUTION INFILTRATION; PERINEURAL at 07:06

## 2021-06-18 RX ADMIN — PRAVASTATIN SODIUM 40 MG: 40 TABLET ORAL at 09:06

## 2021-06-18 RX ADMIN — MYCOPHENOLATE MOFETIL 1500 MG: 500 INJECTION, POWDER, LYOPHILIZED, FOR SOLUTION INTRAVENOUS at 09:06

## 2021-06-18 RX ADMIN — SODIUM CHLORIDE: 0.9 INJECTION, SOLUTION INTRAVENOUS at 05:06

## 2021-06-18 RX ADMIN — TACROLIMUS 2.5 MG: 1 CAPSULE, GELATIN COATED ORAL at 02:06

## 2021-06-18 RX ADMIN — DOCUSATE SODIUM 50MG AND SENNOSIDES 8.6MG 1 TABLET: 8.6; 5 TABLET, FILM COATED ORAL at 12:06

## 2021-06-18 RX ADMIN — METHYLPREDNISOLONE SODIUM SUCCINATE 1000 MG: 1 INJECTION, POWDER, LYOPHILIZED, FOR SOLUTION INTRAMUSCULAR; INTRAVENOUS at 09:06

## 2021-06-19 LAB
ALLENS TEST: ABNORMAL
ANION GAP SERPL CALC-SCNC: 14 MMOL/L (ref 8–16)
BASOPHILS # BLD AUTO: 0.02 K/UL (ref 0–0.2)
BASOPHILS NFR BLD: 0.1 % (ref 0–1.9)
BLD PROD TYP BPU: NORMAL
BLOOD UNIT EXPIRATION DATE: NORMAL
BLOOD UNIT TYPE CODE: 600
BLOOD UNIT TYPE CODE: 6200
BLOOD UNIT TYPE: NORMAL
BUN SERPL-MCNC: 20 MG/DL (ref 6–20)
CALCIUM SERPL-MCNC: 9.1 MG/DL (ref 8.7–10.5)
CHLORIDE SERPL-SCNC: 99 MMOL/L (ref 95–110)
CO2 SERPL-SCNC: 27 MMOL/L (ref 23–29)
CODING SYSTEM: NORMAL
CREAT SERPL-MCNC: 1.2 MG/DL (ref 0.5–1.4)
CRP SERPL-MCNC: 6.3 MG/L (ref 0–8.2)
DELSYS: ABNORMAL
DIFFERENTIAL METHOD: ABNORMAL
DISPENSE STATUS: NORMAL
EOSINOPHIL # BLD AUTO: 0 K/UL (ref 0–0.5)
EOSINOPHIL NFR BLD: 0 % (ref 0–8)
ERYTHROCYTE [DISTWIDTH] IN BLOOD BY AUTOMATED COUNT: 12.7 % (ref 11.5–14.5)
ERYTHROCYTE [SEDIMENTATION RATE] IN BLOOD BY WESTERGREN METHOD: 5 MM/HR (ref 0–23)
EST. GFR  (AFRICAN AMERICAN): >60 ML/MIN/1.73 M^2
EST. GFR  (NON AFRICAN AMERICAN): >60 ML/MIN/1.73 M^2
GLUCOSE SERPL-MCNC: 153 MG/DL (ref 70–110)
HCO3 UR-SCNC: 35.2 MMOL/L (ref 24–28)
HCT VFR BLD AUTO: 44.6 % (ref 40–54)
HGB BLD-MCNC: 15.2 G/DL (ref 14–18)
IMM GRANULOCYTES # BLD AUTO: 0.09 K/UL (ref 0–0.04)
IMM GRANULOCYTES NFR BLD AUTO: 0.5 % (ref 0–0.5)
LYMPHOCYTES # BLD AUTO: 0.6 K/UL (ref 1–4.8)
LYMPHOCYTES NFR BLD: 3.5 % (ref 18–48)
MAGNESIUM SERPL-MCNC: 1.9 MG/DL (ref 1.6–2.6)
MCH RBC QN AUTO: 28.1 PG (ref 27–31)
MCHC RBC AUTO-ENTMCNC: 34.1 G/DL (ref 32–36)
MCV RBC AUTO: 82 FL (ref 82–98)
MONOCYTES # BLD AUTO: 0.5 K/UL (ref 0.3–1)
MONOCYTES NFR BLD: 2.9 % (ref 4–15)
MYCOPHENOLATE SERPL-MCNC: <0.5 MCG/ML (ref 1–3.5)
MYCOPHENOLATE-G SERPL-MCNC: 23 MCG/ML (ref 35–100)
NEUTROPHILS # BLD AUTO: 15.7 K/UL (ref 1.8–7.7)
NEUTROPHILS NFR BLD: 93 % (ref 38–73)
NRBC BLD-RTO: 0 /100 WBC
NUM UNITS TRANS FFP: NORMAL
PCO2 BLDA: 58.8 MMHG (ref 35–45)
PH SMN: 7.39 [PH] (ref 7.35–7.45)
PHOSPHATE SERPL-MCNC: 4.4 MG/DL (ref 2.7–4.5)
PLATELET # BLD AUTO: 151 K/UL (ref 150–450)
PMV BLD AUTO: 11.3 FL (ref 9.2–12.9)
PO2 BLDA: 37 MMHG (ref 40–60)
POC BE: 10 MMOL/L
POC SATURATED O2: 69 % (ref 95–100)
POC TCO2: 37 MMOL/L (ref 24–29)
POCT GLUCOSE: 136 MG/DL (ref 70–110)
POCT GLUCOSE: 146 MG/DL (ref 70–110)
POCT GLUCOSE: 148 MG/DL (ref 70–110)
POCT GLUCOSE: 156 MG/DL (ref 70–110)
POTASSIUM SERPL-SCNC: 3.2 MMOL/L (ref 3.5–5.1)
POTASSIUM SERPL-SCNC: 3.4 MMOL/L (ref 3.5–5.1)
PROCALCITONIN SERPL IA-MCNC: 0.03 NG/ML
RBC # BLD AUTO: 5.41 M/UL (ref 4.6–6.2)
SAMPLE: ABNORMAL
SITE: ABNORMAL
SODIUM SERPL-SCNC: 140 MMOL/L (ref 136–145)
TACROLIMUS BLD-MCNC: 8 NG/ML (ref 5–15)
TACROLIMUS, NORMALIZED: 7.1 NG/ML (ref 5–15)
WBC # BLD AUTO: 16.93 K/UL (ref 3.9–12.7)

## 2021-06-19 PROCEDURE — 63600175 PHARM REV CODE 636 W HCPCS: Performed by: INTERNAL MEDICINE

## 2021-06-19 PROCEDURE — 80197 ASSAY OF TACROLIMUS: CPT | Performed by: INTERNAL MEDICINE

## 2021-06-19 PROCEDURE — P9017 PLASMA 1 DONOR FRZ W/IN 8 HR: HCPCS | Performed by: PATHOLOGY

## 2021-06-19 PROCEDURE — 86140 C-REACTIVE PROTEIN: CPT | Performed by: STUDENT IN AN ORGANIZED HEALTH CARE EDUCATION/TRAINING PROGRAM

## 2021-06-19 PROCEDURE — 83735 ASSAY OF MAGNESIUM: CPT | Performed by: STUDENT IN AN ORGANIZED HEALTH CARE EDUCATION/TRAINING PROGRAM

## 2021-06-19 PROCEDURE — 80048 BASIC METABOLIC PNL TOTAL CA: CPT | Performed by: STUDENT IN AN ORGANIZED HEALTH CARE EDUCATION/TRAINING PROGRAM

## 2021-06-19 PROCEDURE — 36514 PR THER APHERESIS,PLASMA PHERESIS: ICD-10-PCS | Mod: ,,, | Performed by: PATHOLOGY

## 2021-06-19 PROCEDURE — 63600175 PHARM REV CODE 636 W HCPCS: Performed by: PATHOLOGY

## 2021-06-19 PROCEDURE — 99900035 HC TECH TIME PER 15 MIN (STAT)

## 2021-06-19 PROCEDURE — 99233 SBSQ HOSP IP/OBS HIGH 50: CPT | Mod: ,,, | Performed by: INTERNAL MEDICINE

## 2021-06-19 PROCEDURE — 25000003 PHARM REV CODE 250: Performed by: PHYSICIAN ASSISTANT

## 2021-06-19 PROCEDURE — 27000221 HC OXYGEN, UP TO 24 HOURS

## 2021-06-19 PROCEDURE — 63600175 PHARM REV CODE 636 W HCPCS: Performed by: STUDENT IN AN ORGANIZED HEALTH CARE EDUCATION/TRAINING PROGRAM

## 2021-06-19 PROCEDURE — 25000003 PHARM REV CODE 250: Performed by: PATHOLOGY

## 2021-06-19 PROCEDURE — 84132 ASSAY OF SERUM POTASSIUM: CPT | Performed by: INTERNAL MEDICINE

## 2021-06-19 PROCEDURE — 99291 CRITICAL CARE FIRST HOUR: CPT | Mod: ,,, | Performed by: PHYSICIAN ASSISTANT

## 2021-06-19 PROCEDURE — 85025 COMPLETE CBC W/AUTO DIFF WBC: CPT | Performed by: STUDENT IN AN ORGANIZED HEALTH CARE EDUCATION/TRAINING PROGRAM

## 2021-06-19 PROCEDURE — 84100 ASSAY OF PHOSPHORUS: CPT | Performed by: STUDENT IN AN ORGANIZED HEALTH CARE EDUCATION/TRAINING PROGRAM

## 2021-06-19 PROCEDURE — 85652 RBC SED RATE AUTOMATED: CPT | Performed by: STUDENT IN AN ORGANIZED HEALTH CARE EDUCATION/TRAINING PROGRAM

## 2021-06-19 PROCEDURE — 94761 N-INVAS EAR/PLS OXIMETRY MLT: CPT

## 2021-06-19 PROCEDURE — 20000000 HC ICU ROOM

## 2021-06-19 PROCEDURE — 84145 PROCALCITONIN (PCT): CPT | Performed by: STUDENT IN AN ORGANIZED HEALTH CARE EDUCATION/TRAINING PROGRAM

## 2021-06-19 PROCEDURE — 25000003 PHARM REV CODE 250: Performed by: STUDENT IN AN ORGANIZED HEALTH CARE EDUCATION/TRAINING PROGRAM

## 2021-06-19 PROCEDURE — 36514 APHERESIS PLASMA: CPT

## 2021-06-19 PROCEDURE — 99233 PR SUBSEQUENT HOSPITAL CARE,LEVL III: ICD-10-PCS | Mod: ,,, | Performed by: INTERNAL MEDICINE

## 2021-06-19 PROCEDURE — 25000003 PHARM REV CODE 250: Performed by: INTERNAL MEDICINE

## 2021-06-19 PROCEDURE — 36514 APHERESIS PLASMA: CPT | Mod: ,,, | Performed by: PATHOLOGY

## 2021-06-19 PROCEDURE — 99291 PR CRITICAL CARE, E/M 30-74 MINUTES: ICD-10-PCS | Mod: ,,, | Performed by: PHYSICIAN ASSISTANT

## 2021-06-19 RX ORDER — HEPARIN SODIUM 1000 [USP'U]/ML
3200 INJECTION, SOLUTION INTRAVENOUS; SUBCUTANEOUS ONCE
Status: COMPLETED | OUTPATIENT
Start: 2021-06-20 | End: 2021-06-20

## 2021-06-19 RX ORDER — DIPHENHYDRAMINE HYDROCHLORIDE 50 MG/ML
25 INJECTION INTRAMUSCULAR; INTRAVENOUS ONCE
Status: COMPLETED | OUTPATIENT
Start: 2021-06-19 | End: 2021-06-19

## 2021-06-19 RX ORDER — DEXTROSE MONOHYDRATE 50 MG/ML
INJECTION, SOLUTION INTRAVENOUS CONTINUOUS
Status: DISCONTINUED | OUTPATIENT
Start: 2021-06-19 | End: 2021-06-24

## 2021-06-19 RX ORDER — DIPHENHYDRAMINE HYDROCHLORIDE 50 MG/ML
25 INJECTION INTRAMUSCULAR; INTRAVENOUS ONCE
Status: COMPLETED | OUTPATIENT
Start: 2021-06-20 | End: 2021-06-20

## 2021-06-19 RX ORDER — SODIUM CHLORIDE 9 MG/ML
INJECTION, SOLUTION INTRAVENOUS CONTINUOUS
Status: DISCONTINUED | OUTPATIENT
Start: 2021-06-19 | End: 2021-06-19

## 2021-06-19 RX ORDER — HEPARIN SODIUM 1000 [USP'U]/ML
3000 INJECTION, SOLUTION INTRAVENOUS; SUBCUTANEOUS ONCE
Status: COMPLETED | OUTPATIENT
Start: 2021-06-19 | End: 2021-06-19

## 2021-06-19 RX ADMIN — ACETAMINOPHEN 650 MG: 325 TABLET ORAL at 09:06

## 2021-06-19 RX ADMIN — NYSTATIN 500000 UNITS: 100000 SUSPENSION ORAL at 12:06

## 2021-06-19 RX ADMIN — MYCOPHENOLATE MOFETIL 1500 MG: 500 INJECTION, POWDER, LYOPHILIZED, FOR SOLUTION INTRAVENOUS at 09:06

## 2021-06-19 RX ADMIN — SULFAMETHOXAZOLE AND TRIMETHOPRIM 1 TABLET: 400; 80 TABLET ORAL at 09:06

## 2021-06-19 RX ADMIN — DIPHENHYDRAMINE HYDROCHLORIDE 25 MG: 50 INJECTION, SOLUTION INTRAMUSCULAR; INTRAVENOUS at 01:06

## 2021-06-19 RX ADMIN — ACETAMINOPHEN 650 MG: 325 TABLET ORAL at 07:06

## 2021-06-19 RX ADMIN — MUPIROCIN: 20 OINTMENT TOPICAL at 09:06

## 2021-06-19 RX ADMIN — FUROSEMIDE 80 MG: 10 INJECTION, SOLUTION INTRAMUSCULAR; INTRAVENOUS at 11:06

## 2021-06-19 RX ADMIN — METHYLPREDNISOLONE SODIUM SUCCINATE 1000 MG: 1 INJECTION, POWDER, LYOPHILIZED, FOR SOLUTION INTRAMUSCULAR; INTRAVENOUS at 10:06

## 2021-06-19 RX ADMIN — OXYCODONE HYDROCHLORIDE 5 MG: 5 TABLET ORAL at 11:06

## 2021-06-19 RX ADMIN — MELATONIN TAB 3 MG 6 MG: 3 TAB at 09:06

## 2021-06-19 RX ADMIN — FUROSEMIDE 80 MG: 10 INJECTION, SOLUTION INTRAMUSCULAR; INTRAVENOUS at 12:06

## 2021-06-19 RX ADMIN — SODIUM CHLORIDE 5 ML/HR: 0.9 INJECTION, SOLUTION INTRAVENOUS at 09:06

## 2021-06-19 RX ADMIN — CALCIUM GLUCONATE 2000 MG: 98 INJECTION, SOLUTION INTRAVENOUS at 01:06

## 2021-06-19 RX ADMIN — NYSTATIN 500000 UNITS: 100000 SUSPENSION ORAL at 09:06

## 2021-06-19 RX ADMIN — DOCUSATE SODIUM 50MG AND SENNOSIDES 8.6MG 1 TABLET: 8.6; 5 TABLET, FILM COATED ORAL at 09:06

## 2021-06-19 RX ADMIN — HEPARIN SODIUM 3200 UNITS: 1000 INJECTION, SOLUTION INTRAVENOUS; SUBCUTANEOUS at 03:06

## 2021-06-19 RX ADMIN — POTASSIUM BICARBONATE 35 MEQ: 391 TABLET, EFFERVESCENT ORAL at 03:06

## 2021-06-19 RX ADMIN — TACROLIMUS 3 MG: 1 CAPSULE, GELATIN COATED ORAL at 05:06

## 2021-06-19 RX ADMIN — TACROLIMUS 3 MG: 1 CAPSULE, GELATIN COATED ORAL at 06:06

## 2021-06-19 RX ADMIN — PRAVASTATIN SODIUM 40 MG: 40 TABLET ORAL at 09:06

## 2021-06-19 RX ADMIN — NYSTATIN 500000 UNITS: 100000 SUSPENSION ORAL at 05:06

## 2021-06-19 RX ADMIN — DEXTROSE: 5 SOLUTION INTRAVENOUS at 09:06

## 2021-06-19 RX ADMIN — POTASSIUM BICARBONATE 35 MEQ: 391 TABLET, EFFERVESCENT ORAL at 06:06

## 2021-06-20 LAB
ABO + RH BLD: NORMAL
ALLENS TEST: ABNORMAL
ALLENS TEST: ABNORMAL
ANION GAP SERPL CALC-SCNC: 13 MMOL/L (ref 8–16)
ANION GAP SERPL CALC-SCNC: 14 MMOL/L (ref 8–16)
ANION GAP SERPL CALC-SCNC: 16 MMOL/L (ref 8–16)
BASOPHILS # BLD AUTO: 0.01 K/UL (ref 0–0.2)
BASOPHILS NFR BLD: 0.1 % (ref 0–1.9)
BLD GP AB SCN CELLS X3 SERPL QL: NORMAL
BLD PROD TYP BPU: NORMAL
BLOOD UNIT EXPIRATION DATE: NORMAL
BLOOD UNIT TYPE CODE: 600
BLOOD UNIT TYPE: NORMAL
BUN SERPL-MCNC: 15 MG/DL (ref 6–20)
BUN SERPL-MCNC: 16 MG/DL (ref 6–20)
BUN SERPL-MCNC: 16 MG/DL (ref 6–20)
CALCIUM SERPL-MCNC: 8.4 MG/DL (ref 8.7–10.5)
CALCIUM SERPL-MCNC: 8.7 MG/DL (ref 8.7–10.5)
CALCIUM SERPL-MCNC: 8.8 MG/DL (ref 8.7–10.5)
CHLORIDE SERPL-SCNC: 91 MMOL/L (ref 95–110)
CHLORIDE SERPL-SCNC: 92 MMOL/L (ref 95–110)
CHLORIDE SERPL-SCNC: 94 MMOL/L (ref 95–110)
CO2 SERPL-SCNC: 34 MMOL/L (ref 23–29)
CODING SYSTEM: NORMAL
CREAT SERPL-MCNC: 0.9 MG/DL (ref 0.5–1.4)
CREAT SERPL-MCNC: 0.9 MG/DL (ref 0.5–1.4)
CREAT SERPL-MCNC: 1 MG/DL (ref 0.5–1.4)
DELSYS: ABNORMAL
DIFFERENTIAL METHOD: ABNORMAL
DISPENSE STATUS: NORMAL
EOSINOPHIL # BLD AUTO: 0 K/UL (ref 0–0.5)
EOSINOPHIL NFR BLD: 0 % (ref 0–8)
ERYTHROCYTE [DISTWIDTH] IN BLOOD BY AUTOMATED COUNT: 12.4 % (ref 11.5–14.5)
EST. GFR  (AFRICAN AMERICAN): >60 ML/MIN/1.73 M^2
EST. GFR  (NON AFRICAN AMERICAN): >60 ML/MIN/1.73 M^2
GLUCOSE SERPL-MCNC: 106 MG/DL (ref 70–110)
GLUCOSE SERPL-MCNC: 117 MG/DL (ref 70–110)
GLUCOSE SERPL-MCNC: 149 MG/DL (ref 70–110)
HCO3 UR-SCNC: 43.5 MMOL/L (ref 24–28)
HCO3 UR-SCNC: 43.6 MMOL/L (ref 24–28)
HCT VFR BLD AUTO: 48.1 % (ref 40–54)
HGB BLD-MCNC: 16 G/DL (ref 14–18)
IMM GRANULOCYTES # BLD AUTO: 0.06 K/UL (ref 0–0.04)
IMM GRANULOCYTES NFR BLD AUTO: 0.5 % (ref 0–0.5)
LYMPHOCYTES # BLD AUTO: 0.5 K/UL (ref 1–4.8)
LYMPHOCYTES NFR BLD: 4.2 % (ref 18–48)
MAGNESIUM SERPL-MCNC: 1.7 MG/DL (ref 1.6–2.6)
MAGNESIUM SERPL-MCNC: 2 MG/DL (ref 1.6–2.6)
MCH RBC QN AUTO: 27.9 PG (ref 27–31)
MCHC RBC AUTO-ENTMCNC: 33.3 G/DL (ref 32–36)
MCV RBC AUTO: 84 FL (ref 82–98)
MODE: ABNORMAL
MONOCYTES # BLD AUTO: 0.5 K/UL (ref 0.3–1)
MONOCYTES NFR BLD: 3.9 % (ref 4–15)
NEUTROPHILS # BLD AUTO: 11.4 K/UL (ref 1.8–7.7)
NEUTROPHILS NFR BLD: 91.3 % (ref 38–73)
NRBC BLD-RTO: 0 /100 WBC
NUM UNITS TRANS FFP: NORMAL
PCO2 BLDA: 61.2 MMHG (ref 35–45)
PCO2 BLDA: 64.3 MMHG (ref 35–45)
PH SMN: 7.44 [PH] (ref 7.35–7.45)
PH SMN: 7.46 [PH] (ref 7.35–7.45)
PHOSPHATE SERPL-MCNC: 4.3 MG/DL (ref 2.7–4.5)
PLATELET # BLD AUTO: 204 K/UL (ref 150–450)
PMV BLD AUTO: 11.3 FL (ref 9.2–12.9)
PO2 BLDA: 36 MMHG (ref 40–60)
PO2 BLDA: 38 MMHG (ref 40–60)
POC BE: 19 MMOL/L
POC BE: 20 MMOL/L
POC SATURATED O2: 68 % (ref 95–100)
POC SATURATED O2: 72 % (ref 95–100)
POC TCO2: 45 MMOL/L (ref 24–29)
POC TCO2: 46 MMOL/L (ref 24–29)
POCT GLUCOSE: 114 MG/DL (ref 70–110)
POCT GLUCOSE: 125 MG/DL (ref 70–110)
POCT GLUCOSE: 126 MG/DL (ref 70–110)
POCT GLUCOSE: 131 MG/DL (ref 70–110)
POTASSIUM SERPL-SCNC: 3.5 MMOL/L (ref 3.5–5.1)
POTASSIUM SERPL-SCNC: 3.6 MMOL/L (ref 3.5–5.1)
POTASSIUM SERPL-SCNC: 3.7 MMOL/L (ref 3.5–5.1)
POTASSIUM SERPL-SCNC: 4 MMOL/L (ref 3.5–5.1)
RBC # BLD AUTO: 5.73 M/UL (ref 4.6–6.2)
SAMPLE: ABNORMAL
SAMPLE: ABNORMAL
SITE: ABNORMAL
SITE: ABNORMAL
SODIUM SERPL-SCNC: 140 MMOL/L (ref 136–145)
SODIUM SERPL-SCNC: 141 MMOL/L (ref 136–145)
SODIUM SERPL-SCNC: 141 MMOL/L (ref 136–145)
TACROLIMUS BLD-MCNC: 8.8 NG/ML (ref 5–15)
TACROLIMUS, NORMALIZED: 7.8 NG/ML (ref 5–15)
WBC # BLD AUTO: 12.44 K/UL (ref 3.9–12.7)

## 2021-06-20 PROCEDURE — 99900035 HC TECH TIME PER 15 MIN (STAT)

## 2021-06-20 PROCEDURE — 80048 BASIC METABOLIC PNL TOTAL CA: CPT | Performed by: STUDENT IN AN ORGANIZED HEALTH CARE EDUCATION/TRAINING PROGRAM

## 2021-06-20 PROCEDURE — 84100 ASSAY OF PHOSPHORUS: CPT | Performed by: STUDENT IN AN ORGANIZED HEALTH CARE EDUCATION/TRAINING PROGRAM

## 2021-06-20 PROCEDURE — 63600175 PHARM REV CODE 636 W HCPCS: Performed by: PATHOLOGY

## 2021-06-20 PROCEDURE — 93010 ELECTROCARDIOGRAM REPORT: CPT | Mod: ,,, | Performed by: INTERNAL MEDICINE

## 2021-06-20 PROCEDURE — 94761 N-INVAS EAR/PLS OXIMETRY MLT: CPT

## 2021-06-20 PROCEDURE — 25000003 PHARM REV CODE 250: Performed by: STUDENT IN AN ORGANIZED HEALTH CARE EDUCATION/TRAINING PROGRAM

## 2021-06-20 PROCEDURE — 36514 APHERESIS PLASMA: CPT | Mod: ,,, | Performed by: PATHOLOGY

## 2021-06-20 PROCEDURE — 85025 COMPLETE CBC W/AUTO DIFF WBC: CPT | Performed by: STUDENT IN AN ORGANIZED HEALTH CARE EDUCATION/TRAINING PROGRAM

## 2021-06-20 PROCEDURE — 97162 PT EVAL MOD COMPLEX 30 MIN: CPT

## 2021-06-20 PROCEDURE — 25000003 PHARM REV CODE 250: Performed by: PHYSICIAN ASSISTANT

## 2021-06-20 PROCEDURE — 84132 ASSAY OF SERUM POTASSIUM: CPT | Performed by: INTERNAL MEDICINE

## 2021-06-20 PROCEDURE — 25000003 PHARM REV CODE 250: Performed by: PATHOLOGY

## 2021-06-20 PROCEDURE — 99233 PR SUBSEQUENT HOSPITAL CARE,LEVL III: ICD-10-PCS | Mod: ,,, | Performed by: INTERNAL MEDICINE

## 2021-06-20 PROCEDURE — 99291 CRITICAL CARE FIRST HOUR: CPT | Mod: ,,, | Performed by: PHYSICIAN ASSISTANT

## 2021-06-20 PROCEDURE — 83735 ASSAY OF MAGNESIUM: CPT | Mod: 91 | Performed by: INTERNAL MEDICINE

## 2021-06-20 PROCEDURE — 83735 ASSAY OF MAGNESIUM: CPT | Performed by: STUDENT IN AN ORGANIZED HEALTH CARE EDUCATION/TRAINING PROGRAM

## 2021-06-20 PROCEDURE — 86900 BLOOD TYPING SEROLOGIC ABO: CPT

## 2021-06-20 PROCEDURE — 93005 ELECTROCARDIOGRAM TRACING: CPT

## 2021-06-20 PROCEDURE — 80197 ASSAY OF TACROLIMUS: CPT | Performed by: INTERNAL MEDICINE

## 2021-06-20 PROCEDURE — 20000000 HC ICU ROOM

## 2021-06-20 PROCEDURE — 63600175 PHARM REV CODE 636 W HCPCS: Performed by: INTERNAL MEDICINE

## 2021-06-20 PROCEDURE — 63600175 PHARM REV CODE 636 W HCPCS

## 2021-06-20 PROCEDURE — P9017 PLASMA 1 DONOR FRZ W/IN 8 HR: HCPCS | Performed by: PATHOLOGY

## 2021-06-20 PROCEDURE — 25000003 PHARM REV CODE 250: Performed by: INTERNAL MEDICINE

## 2021-06-20 PROCEDURE — 36514 PR THER APHERESIS,PLASMA PHERESIS: ICD-10-PCS | Mod: ,,, | Performed by: PATHOLOGY

## 2021-06-20 PROCEDURE — 36514 APHERESIS PLASMA: CPT

## 2021-06-20 PROCEDURE — 99233 SBSQ HOSP IP/OBS HIGH 50: CPT | Mod: ,,, | Performed by: INTERNAL MEDICINE

## 2021-06-20 PROCEDURE — 97165 OT EVAL LOW COMPLEX 30 MIN: CPT

## 2021-06-20 PROCEDURE — 99291 PR CRITICAL CARE, E/M 30-74 MINUTES: ICD-10-PCS | Mod: ,,, | Performed by: PHYSICIAN ASSISTANT

## 2021-06-20 PROCEDURE — 93010 EKG 12-LEAD: ICD-10-PCS | Mod: ,,, | Performed by: INTERNAL MEDICINE

## 2021-06-20 PROCEDURE — 80048 BASIC METABOLIC PNL TOTAL CA: CPT | Mod: 91 | Performed by: INTERNAL MEDICINE

## 2021-06-20 RX ORDER — DIPHENHYDRAMINE HYDROCHLORIDE 50 MG/ML
25 INJECTION INTRAMUSCULAR; INTRAVENOUS ONCE
Status: COMPLETED | OUTPATIENT
Start: 2021-06-20 | End: 2021-06-20

## 2021-06-20 RX ORDER — ALBUMIN HUMAN 50 G/1000ML
50 SOLUTION INTRAVENOUS ONCE
Status: COMPLETED | OUTPATIENT
Start: 2021-06-21 | End: 2021-06-21

## 2021-06-20 RX ORDER — DIPHENHYDRAMINE HYDROCHLORIDE 50 MG/ML
25 INJECTION INTRAMUSCULAR; INTRAVENOUS ONCE
Status: COMPLETED | OUTPATIENT
Start: 2021-06-21 | End: 2021-06-21

## 2021-06-20 RX ORDER — HEPARIN SODIUM 1000 [USP'U]/ML
3200 INJECTION, SOLUTION INTRAVENOUS; SUBCUTANEOUS ONCE
Status: COMPLETED | OUTPATIENT
Start: 2021-06-21 | End: 2021-06-21

## 2021-06-20 RX ORDER — DRONABINOL 2.5 MG/1
2.5 CAPSULE ORAL 2 TIMES DAILY
Status: DISCONTINUED | OUTPATIENT
Start: 2021-06-20 | End: 2021-06-21

## 2021-06-20 RX ORDER — FAMOTIDINE 10 MG/ML
20 INJECTION INTRAVENOUS 2 TIMES DAILY PRN
Status: DISCONTINUED | OUTPATIENT
Start: 2021-06-20 | End: 2021-06-22

## 2021-06-20 RX ORDER — BISMUTH SUBSALICYLATE 525 MG/30ML
30 LIQUID ORAL EVERY 6 HOURS PRN
Status: DISCONTINUED | OUTPATIENT
Start: 2021-06-20 | End: 2021-06-22

## 2021-06-20 RX ADMIN — POTASSIUM BICARBONATE 50 MEQ: 977.5 TABLET, EFFERVESCENT ORAL at 12:06

## 2021-06-20 RX ADMIN — PREDNISONE 20 MG: 20 TABLET ORAL at 09:06

## 2021-06-20 RX ADMIN — TACROLIMUS 3 MG: 1 CAPSULE, GELATIN COATED ORAL at 05:06

## 2021-06-20 RX ADMIN — Medication 800 MG: at 06:06

## 2021-06-20 RX ADMIN — MYCOPHENOLATE MOFETIL 1500 MG: 500 INJECTION, POWDER, LYOPHILIZED, FOR SOLUTION INTRAVENOUS at 09:06

## 2021-06-20 RX ADMIN — NYSTATIN 500000 UNITS: 100000 SUSPENSION ORAL at 05:06

## 2021-06-20 RX ADMIN — PRAVASTATIN SODIUM 40 MG: 40 TABLET ORAL at 09:06

## 2021-06-20 RX ADMIN — MUPIROCIN: 20 OINTMENT TOPICAL at 09:06

## 2021-06-20 RX ADMIN — POTASSIUM BICARBONATE 35 MEQ: 391 TABLET, EFFERVESCENT ORAL at 05:06

## 2021-06-20 RX ADMIN — CALCIUM GLUCONATE 2000 MG: 98 INJECTION, SOLUTION INTRAVENOUS at 10:06

## 2021-06-20 RX ADMIN — DEXTROSE: 5 SOLUTION INTRAVENOUS at 09:06

## 2021-06-20 RX ADMIN — ONDANSETRON 8 MG: 8 TABLET, ORALLY DISINTEGRATING ORAL at 01:06

## 2021-06-20 RX ADMIN — Medication 800 MG: at 11:06

## 2021-06-20 RX ADMIN — DIPHENHYDRAMINE HYDROCHLORIDE 25 MG: 50 INJECTION, SOLUTION INTRAMUSCULAR; INTRAVENOUS at 09:06

## 2021-06-20 RX ADMIN — SULFAMETHOXAZOLE AND TRIMETHOPRIM 1 TABLET: 400; 80 TABLET ORAL at 09:06

## 2021-06-20 RX ADMIN — NYSTATIN 500000 UNITS: 100000 SUSPENSION ORAL at 07:06

## 2021-06-20 RX ADMIN — DIPHENHYDRAMINE HYDROCHLORIDE 25 MG: 50 INJECTION, SOLUTION INTRAMUSCULAR; INTRAVENOUS at 10:06

## 2021-06-20 RX ADMIN — POTASSIUM BICARBONATE 35 MEQ: 391 TABLET, EFFERVESCENT ORAL at 03:06

## 2021-06-20 RX ADMIN — HEPARIN SODIUM 3200 UNITS: 1000 INJECTION, SOLUTION INTRAVENOUS; SUBCUTANEOUS at 11:06

## 2021-06-20 RX ADMIN — NYSTATIN 500000 UNITS: 100000 SUSPENSION ORAL at 12:06

## 2021-06-20 RX ADMIN — ONDANSETRON 8 MG: 8 TABLET, ORALLY DISINTEGRATING ORAL at 06:06

## 2021-06-20 RX ADMIN — POTASSIUM BICARBONATE 50 MEQ: 977.5 TABLET, EFFERVESCENT ORAL at 05:06

## 2021-06-21 LAB
ALBUMIN SERPL BCP-MCNC: 3.5 G/DL (ref 3.5–5.2)
ALLENS TEST: ABNORMAL
ALP SERPL-CCNC: 74 U/L (ref 55–135)
ALT SERPL W/O P-5'-P-CCNC: 26 U/L (ref 10–44)
ANION GAP SERPL CALC-SCNC: 9 MMOL/L (ref 8–16)
AST SERPL-CCNC: 17 U/L (ref 10–40)
BASOPHILS # BLD AUTO: 0.01 K/UL (ref 0–0.2)
BASOPHILS NFR BLD: 0.1 % (ref 0–1.9)
BILIRUB DIRECT SERPL-MCNC: 0.4 MG/DL (ref 0.1–0.3)
BILIRUB SERPL-MCNC: 1.1 MG/DL (ref 0.1–1)
BLD PROD TYP BPU: NORMAL
BLOOD UNIT EXPIRATION DATE: NORMAL
BLOOD UNIT TYPE CODE: 600
BLOOD UNIT TYPE CODE: 6200
BLOOD UNIT TYPE: NORMAL
BUN SERPL-MCNC: 15 MG/DL (ref 6–20)
CALCIUM SERPL-MCNC: 8.6 MG/DL (ref 8.7–10.5)
CHLORIDE SERPL-SCNC: 94 MMOL/L (ref 95–110)
CO2 SERPL-SCNC: 34 MMOL/L (ref 23–29)
CODING SYSTEM: NORMAL
CREAT SERPL-MCNC: 0.9 MG/DL (ref 0.5–1.4)
CRP SERPL-MCNC: 1.9 MG/L (ref 0–8.2)
DELSYS: ABNORMAL
DIFFERENTIAL METHOD: ABNORMAL
DISPENSE STATUS: NORMAL
EOSINOPHIL # BLD AUTO: 0.1 K/UL (ref 0–0.5)
EOSINOPHIL NFR BLD: 0.9 % (ref 0–8)
ERYTHROCYTE [DISTWIDTH] IN BLOOD BY AUTOMATED COUNT: 12.6 % (ref 11.5–14.5)
ERYTHROCYTE [SEDIMENTATION RATE] IN BLOOD BY WESTERGREN METHOD: <2 MM/HR (ref 0–23)
EST. GFR  (AFRICAN AMERICAN): >60 ML/MIN/1.73 M^2
EST. GFR  (NON AFRICAN AMERICAN): >60 ML/MIN/1.73 M^2
GLUCOSE SERPL-MCNC: 110 MG/DL (ref 70–110)
HBV CORE AB SERPL QL IA: NEGATIVE
HCO3 UR-SCNC: 38.5 MMOL/L (ref 24–28)
HCT VFR BLD AUTO: 50.7 % (ref 40–54)
HGB BLD-MCNC: 16.5 G/DL (ref 14–18)
IMM GRANULOCYTES # BLD AUTO: 0.06 K/UL (ref 0–0.04)
IMM GRANULOCYTES NFR BLD AUTO: 0.7 % (ref 0–0.5)
LYMPHOCYTES # BLD AUTO: 1 K/UL (ref 1–4.8)
LYMPHOCYTES NFR BLD: 10.4 % (ref 18–48)
MAGNESIUM SERPL-MCNC: 2.2 MG/DL (ref 1.6–2.6)
MCH RBC QN AUTO: 28 PG (ref 27–31)
MCHC RBC AUTO-ENTMCNC: 32.5 G/DL (ref 32–36)
MCV RBC AUTO: 86 FL (ref 82–98)
MONOCYTES # BLD AUTO: 0.7 K/UL (ref 0.3–1)
MONOCYTES NFR BLD: 7.9 % (ref 4–15)
NEUTROPHILS # BLD AUTO: 7.3 K/UL (ref 1.8–7.7)
NEUTROPHILS NFR BLD: 80 % (ref 38–73)
NRBC BLD-RTO: 0 /100 WBC
NUM UNITS TRANS FFP: NORMAL
PCO2 BLDA: 65.4 MMHG (ref 35–45)
PH SMN: 7.38 [PH] (ref 7.35–7.45)
PHOSPHATE SERPL-MCNC: 4.6 MG/DL (ref 2.7–4.5)
PLATELET # BLD AUTO: 197 K/UL (ref 150–450)
PMV BLD AUTO: 10.9 FL (ref 9.2–12.9)
PO2 BLDA: 36 MMHG (ref 40–60)
POC BE: 13 MMOL/L
POC SATURATED O2: 65 % (ref 95–100)
POC TCO2: 41 MMOL/L (ref 24–29)
POCT GLUCOSE: 119 MG/DL (ref 70–110)
POCT GLUCOSE: 121 MG/DL (ref 70–110)
POCT GLUCOSE: 134 MG/DL (ref 70–110)
POTASSIUM SERPL-SCNC: 4.4 MMOL/L (ref 3.5–5.1)
PROCALCITONIN SERPL IA-MCNC: 0.03 NG/ML
PROT SERPL-MCNC: 6.4 G/DL (ref 6–8.4)
RBC # BLD AUTO: 5.89 M/UL (ref 4.6–6.2)
SAMPLE: ABNORMAL
SITE: ABNORMAL
SODIUM SERPL-SCNC: 137 MMOL/L (ref 136–145)
TACROLIMUS BLD-MCNC: 13.1 NG/ML (ref 5–15)
TACROLIMUS, NORMALIZED: 11.4 NG/ML (ref 5–15)
WBC # BLD AUTO: 9.1 K/UL (ref 3.9–12.7)

## 2021-06-21 PROCEDURE — 80048 BASIC METABOLIC PNL TOTAL CA: CPT | Performed by: STUDENT IN AN ORGANIZED HEALTH CARE EDUCATION/TRAINING PROGRAM

## 2021-06-21 PROCEDURE — 99233 SBSQ HOSP IP/OBS HIGH 50: CPT | Mod: ,,, | Performed by: INTERNAL MEDICINE

## 2021-06-21 PROCEDURE — 94761 N-INVAS EAR/PLS OXIMETRY MLT: CPT

## 2021-06-21 PROCEDURE — 36514 PR THER APHERESIS,PLASMA PHERESIS: ICD-10-PCS | Mod: ,,, | Performed by: PATHOLOGY

## 2021-06-21 PROCEDURE — 80076 HEPATIC FUNCTION PANEL: CPT | Performed by: STUDENT IN AN ORGANIZED HEALTH CARE EDUCATION/TRAINING PROGRAM

## 2021-06-21 PROCEDURE — 36514 APHERESIS PLASMA: CPT

## 2021-06-21 PROCEDURE — 25000003 PHARM REV CODE 250: Performed by: INTERNAL MEDICINE

## 2021-06-21 PROCEDURE — 93010 ELECTROCARDIOGRAM REPORT: CPT | Mod: ,,, | Performed by: INTERNAL MEDICINE

## 2021-06-21 PROCEDURE — 25000003 PHARM REV CODE 250: Performed by: PATHOLOGY

## 2021-06-21 PROCEDURE — 85025 COMPLETE CBC W/AUTO DIFF WBC: CPT | Performed by: STUDENT IN AN ORGANIZED HEALTH CARE EDUCATION/TRAINING PROGRAM

## 2021-06-21 PROCEDURE — 86140 C-REACTIVE PROTEIN: CPT | Performed by: STUDENT IN AN ORGANIZED HEALTH CARE EDUCATION/TRAINING PROGRAM

## 2021-06-21 PROCEDURE — 25000003 PHARM REV CODE 250: Performed by: STUDENT IN AN ORGANIZED HEALTH CARE EDUCATION/TRAINING PROGRAM

## 2021-06-21 PROCEDURE — 99291 PR CRITICAL CARE, E/M 30-74 MINUTES: ICD-10-PCS | Mod: ,,, | Performed by: INTERNAL MEDICINE

## 2021-06-21 PROCEDURE — 99900035 HC TECH TIME PER 15 MIN (STAT)

## 2021-06-21 PROCEDURE — 36514 APHERESIS PLASMA: CPT | Mod: ,,, | Performed by: PATHOLOGY

## 2021-06-21 PROCEDURE — P9045 ALBUMIN (HUMAN), 5%, 250 ML: HCPCS | Mod: JG | Performed by: PATHOLOGY

## 2021-06-21 PROCEDURE — 93005 ELECTROCARDIOGRAM TRACING: CPT

## 2021-06-21 PROCEDURE — 63600175 PHARM REV CODE 636 W HCPCS: Performed by: PATHOLOGY

## 2021-06-21 PROCEDURE — 80197 ASSAY OF TACROLIMUS: CPT | Performed by: INTERNAL MEDICINE

## 2021-06-21 PROCEDURE — 25000003 PHARM REV CODE 250: Performed by: PHYSICIAN ASSISTANT

## 2021-06-21 PROCEDURE — 84100 ASSAY OF PHOSPHORUS: CPT | Performed by: STUDENT IN AN ORGANIZED HEALTH CARE EDUCATION/TRAINING PROGRAM

## 2021-06-21 PROCEDURE — P9017 PLASMA 1 DONOR FRZ W/IN 8 HR: HCPCS | Performed by: PATHOLOGY

## 2021-06-21 PROCEDURE — 20000000 HC ICU ROOM

## 2021-06-21 PROCEDURE — 63600175 PHARM REV CODE 636 W HCPCS: Performed by: INTERNAL MEDICINE

## 2021-06-21 PROCEDURE — 93010 EKG 12-LEAD: ICD-10-PCS | Mod: ,,, | Performed by: INTERNAL MEDICINE

## 2021-06-21 PROCEDURE — 85652 RBC SED RATE AUTOMATED: CPT | Performed by: STUDENT IN AN ORGANIZED HEALTH CARE EDUCATION/TRAINING PROGRAM

## 2021-06-21 PROCEDURE — 99291 CRITICAL CARE FIRST HOUR: CPT | Mod: ,,, | Performed by: INTERNAL MEDICINE

## 2021-06-21 PROCEDURE — 83735 ASSAY OF MAGNESIUM: CPT | Performed by: STUDENT IN AN ORGANIZED HEALTH CARE EDUCATION/TRAINING PROGRAM

## 2021-06-21 PROCEDURE — 86704 HEP B CORE ANTIBODY TOTAL: CPT | Performed by: INTERNAL MEDICINE

## 2021-06-21 PROCEDURE — 99233 PR SUBSEQUENT HOSPITAL CARE,LEVL III: ICD-10-PCS | Mod: ,,, | Performed by: INTERNAL MEDICINE

## 2021-06-21 PROCEDURE — 84145 PROCALCITONIN (PCT): CPT | Performed by: STUDENT IN AN ORGANIZED HEALTH CARE EDUCATION/TRAINING PROGRAM

## 2021-06-21 RX ORDER — INSULIN ASPART 100 [IU]/ML
0-5 INJECTION, SOLUTION INTRAVENOUS; SUBCUTANEOUS
Status: DISCONTINUED | OUTPATIENT
Start: 2021-06-21 | End: 2021-06-25 | Stop reason: HOSPADM

## 2021-06-21 RX ORDER — MYCOPHENOLATE MOFETIL 200 MG/ML
1500 POWDER, FOR SUSPENSION ORAL 2 TIMES DAILY
Qty: 480 ML | Refills: 11 | Status: ON HOLD | OUTPATIENT
Start: 2021-06-21 | End: 2021-07-19 | Stop reason: SDUPTHER

## 2021-06-21 RX ADMIN — DIPHENHYDRAMINE HYDROCHLORIDE 25 MG: 50 INJECTION, SOLUTION INTRAMUSCULAR; INTRAVENOUS at 10:06

## 2021-06-21 RX ADMIN — TACROLIMUS 3 MG: 1 CAPSULE, GELATIN COATED ORAL at 05:06

## 2021-06-21 RX ADMIN — PREDNISONE 20 MG: 20 TABLET ORAL at 08:06

## 2021-06-21 RX ADMIN — NYSTATIN 500000 UNITS: 100000 SUSPENSION ORAL at 08:06

## 2021-06-21 RX ADMIN — PRAVASTATIN SODIUM 40 MG: 40 TABLET ORAL at 08:06

## 2021-06-21 RX ADMIN — SULFAMETHOXAZOLE AND TRIMETHOPRIM 1 TABLET: 400; 80 TABLET ORAL at 08:06

## 2021-06-21 RX ADMIN — ALBUMIN (HUMAN) 50 G: 12.5 SOLUTION INTRAVENOUS at 10:06

## 2021-06-21 RX ADMIN — MYCOPHENOLATE MOFETIL 1500 MG: 500 INJECTION, POWDER, LYOPHILIZED, FOR SOLUTION INTRAVENOUS at 08:06

## 2021-06-21 RX ADMIN — MUPIROCIN: 20 OINTMENT TOPICAL at 08:06

## 2021-06-21 RX ADMIN — NYSTATIN 500000 UNITS: 100000 SUSPENSION ORAL at 05:06

## 2021-06-21 RX ADMIN — MYCOPHENOLATE MOFETIL 1500 MG: 500 INJECTION, POWDER, LYOPHILIZED, FOR SOLUTION INTRAVENOUS at 09:06

## 2021-06-21 RX ADMIN — ACETAMINOPHEN 650 MG: 325 TABLET ORAL at 08:06

## 2021-06-21 RX ADMIN — MUPIROCIN: 20 OINTMENT TOPICAL at 09:06

## 2021-06-21 RX ADMIN — SODIUM CHLORIDE: 0.9 INJECTION, SOLUTION INTRAVENOUS at 07:06

## 2021-06-21 RX ADMIN — HEPARIN SODIUM 3200 UNITS: 1000 INJECTION, SOLUTION INTRAVENOUS; SUBCUTANEOUS at 12:06

## 2021-06-21 RX ADMIN — CALCIUM GLUCONATE 2000 MG: 98 INJECTION, SOLUTION INTRAVENOUS at 10:06

## 2021-06-21 RX ADMIN — NYSTATIN 500000 UNITS: 100000 SUSPENSION ORAL at 12:06

## 2021-06-21 RX ADMIN — DOCUSATE SODIUM 50MG AND SENNOSIDES 8.6MG 1 TABLET: 8.6; 5 TABLET, FILM COATED ORAL at 08:06

## 2021-06-22 LAB
ALLENS TEST: ABNORMAL
ANION GAP SERPL CALC-SCNC: 6 MMOL/L (ref 8–16)
BASOPHILS # BLD AUTO: 0.02 K/UL (ref 0–0.2)
BASOPHILS NFR BLD: 0.2 % (ref 0–1.9)
BUN SERPL-MCNC: 18 MG/DL (ref 6–20)
CALCIUM SERPL-MCNC: 8.5 MG/DL (ref 8.7–10.5)
CHLORIDE SERPL-SCNC: 100 MMOL/L (ref 95–110)
CO2 SERPL-SCNC: 29 MMOL/L (ref 23–29)
CREAT SERPL-MCNC: 0.8 MG/DL (ref 0.5–1.4)
DELSYS: ABNORMAL
DIFFERENTIAL METHOD: ABNORMAL
EOSINOPHIL # BLD AUTO: 0.3 K/UL (ref 0–0.5)
EOSINOPHIL NFR BLD: 2.3 % (ref 0–8)
ERYTHROCYTE [DISTWIDTH] IN BLOOD BY AUTOMATED COUNT: 12.7 % (ref 11.5–14.5)
EST. GFR  (AFRICAN AMERICAN): >60 ML/MIN/1.73 M^2
EST. GFR  (NON AFRICAN AMERICAN): >60 ML/MIN/1.73 M^2
FINAL PATHOLOGIC DIAGNOSIS: NORMAL
GLUCOSE SERPL-MCNC: 106 MG/DL (ref 70–110)
GROSS: NORMAL
HCO3 UR-SCNC: 33.5 MMOL/L (ref 24–28)
HCT VFR BLD AUTO: 47.2 % (ref 40–54)
HGB BLD-MCNC: 16.1 G/DL (ref 14–18)
IMM GRANULOCYTES # BLD AUTO: 0.07 K/UL (ref 0–0.04)
IMM GRANULOCYTES NFR BLD AUTO: 0.6 % (ref 0–0.5)
LYMPHOCYTES # BLD AUTO: 1.5 K/UL (ref 1–4.8)
LYMPHOCYTES NFR BLD: 12.9 % (ref 18–48)
Lab: NORMAL
MAGNESIUM SERPL-MCNC: 1.9 MG/DL (ref 1.6–2.6)
MCH RBC QN AUTO: 28.7 PG (ref 27–31)
MCHC RBC AUTO-ENTMCNC: 34.1 G/DL (ref 32–36)
MCV RBC AUTO: 84 FL (ref 82–98)
MICROSCOPIC EXAM: NORMAL
MODE: ABNORMAL
MONOCYTES # BLD AUTO: 0.7 K/UL (ref 0.3–1)
MONOCYTES NFR BLD: 6.3 % (ref 4–15)
NEUTROPHILS # BLD AUTO: 8.9 K/UL (ref 1.8–7.7)
NEUTROPHILS NFR BLD: 77.7 % (ref 38–73)
NRBC BLD-RTO: 0 /100 WBC
PCO2 BLDA: 60.2 MMHG (ref 35–45)
PH SMN: 7.35 [PH] (ref 7.35–7.45)
PHOSPHATE SERPL-MCNC: 3.5 MG/DL (ref 2.7–4.5)
PLATELET # BLD AUTO: 188 K/UL (ref 150–450)
PMV BLD AUTO: 10.9 FL (ref 9.2–12.9)
PO2 BLDA: 36 MMHG (ref 40–60)
POC BE: 8 MMOL/L
POC SATURATED O2: 64 % (ref 95–100)
POC TCO2: 35 MMOL/L (ref 24–29)
POCT GLUCOSE: 128 MG/DL (ref 70–110)
POCT GLUCOSE: 153 MG/DL (ref 70–110)
POTASSIUM SERPL-SCNC: 4.9 MMOL/L (ref 3.5–5.1)
RBC # BLD AUTO: 5.61 M/UL (ref 4.6–6.2)
SAMPLE: ABNORMAL
SITE: ABNORMAL
SODIUM SERPL-SCNC: 135 MMOL/L (ref 136–145)
SUPPLEMENTAL DIAGNOSIS: NORMAL
TACROLIMUS BLD-MCNC: 10.7 NG/ML (ref 5–15)
TACROLIMUS, NORMALIZED: 9.4 NG/ML (ref 5–15)
WBC # BLD AUTO: 11.43 K/UL (ref 3.9–12.7)

## 2021-06-22 PROCEDURE — 84100 ASSAY OF PHOSPHORUS: CPT | Performed by: STUDENT IN AN ORGANIZED HEALTH CARE EDUCATION/TRAINING PROGRAM

## 2021-06-22 PROCEDURE — 99291 CRITICAL CARE FIRST HOUR: CPT | Mod: ,,, | Performed by: NURSE PRACTITIONER

## 2021-06-22 PROCEDURE — 97116 GAIT TRAINING THERAPY: CPT

## 2021-06-22 PROCEDURE — 63600175 PHARM REV CODE 636 W HCPCS: Performed by: INTERNAL MEDICINE

## 2021-06-22 PROCEDURE — 99291 PR CRITICAL CARE, E/M 30-74 MINUTES: ICD-10-PCS | Mod: ,,, | Performed by: NURSE PRACTITIONER

## 2021-06-22 PROCEDURE — 25000003 PHARM REV CODE 250: Performed by: INTERNAL MEDICINE

## 2021-06-22 PROCEDURE — 20000000 HC ICU ROOM

## 2021-06-22 PROCEDURE — 85025 COMPLETE CBC W/AUTO DIFF WBC: CPT | Performed by: STUDENT IN AN ORGANIZED HEALTH CARE EDUCATION/TRAINING PROGRAM

## 2021-06-22 PROCEDURE — 83735 ASSAY OF MAGNESIUM: CPT | Performed by: STUDENT IN AN ORGANIZED HEALTH CARE EDUCATION/TRAINING PROGRAM

## 2021-06-22 PROCEDURE — 80197 ASSAY OF TACROLIMUS: CPT | Performed by: INTERNAL MEDICINE

## 2021-06-22 PROCEDURE — 25000003 PHARM REV CODE 250: Performed by: PHYSICIAN ASSISTANT

## 2021-06-22 PROCEDURE — 80048 BASIC METABOLIC PNL TOTAL CA: CPT | Performed by: STUDENT IN AN ORGANIZED HEALTH CARE EDUCATION/TRAINING PROGRAM

## 2021-06-22 RX ORDER — MYCOPHENOLATE MOFETIL 200 MG/ML
1500 POWDER, FOR SUSPENSION ORAL 2 TIMES DAILY
Status: DISCONTINUED | OUTPATIENT
Start: 2021-06-22 | End: 2021-06-25 | Stop reason: HOSPADM

## 2021-06-22 RX ORDER — TACROLIMUS 1 MG/1
2 CAPSULE ORAL 2 TIMES DAILY
Status: DISCONTINUED | OUTPATIENT
Start: 2021-06-22 | End: 2021-06-25 | Stop reason: HOSPADM

## 2021-06-22 RX ORDER — PREDNISONE 20 MG/1
20 TABLET ORAL DAILY
Status: DISCONTINUED | OUTPATIENT
Start: 2021-06-24 | End: 2021-06-24

## 2021-06-22 RX ADMIN — MYCOPHENOLATE MOFETIL 1500 MG: 500 INJECTION, POWDER, LYOPHILIZED, FOR SOLUTION INTRAVENOUS at 08:06

## 2021-06-22 RX ADMIN — TACROLIMUS 3 MG: 1 CAPSULE, GELATIN COATED ORAL at 06:06

## 2021-06-22 RX ADMIN — NYSTATIN 500000 UNITS: 100000 SUSPENSION ORAL at 08:06

## 2021-06-22 RX ADMIN — NYSTATIN 500000 UNITS: 100000 SUSPENSION ORAL at 01:06

## 2021-06-22 RX ADMIN — PREDNISONE 20 MG: 20 TABLET ORAL at 08:06

## 2021-06-22 RX ADMIN — PREDNISONE 15 MG: 5 TABLET ORAL at 08:06

## 2021-06-22 RX ADMIN — TACROLIMUS 2 MG: 1 CAPSULE ORAL at 06:06

## 2021-06-22 RX ADMIN — MUPIROCIN: 20 OINTMENT TOPICAL at 08:06

## 2021-06-22 RX ADMIN — SULFAMETHOXAZOLE AND TRIMETHOPRIM 1 TABLET: 400; 80 TABLET ORAL at 08:06

## 2021-06-22 RX ADMIN — NYSTATIN 500000 UNITS: 100000 SUSPENSION ORAL at 04:06

## 2021-06-22 RX ADMIN — MYCOPHENOLATE MOFETIL 1500 MG: 200 POWDER, FOR SUSPENSION ORAL at 08:06

## 2021-06-22 RX ADMIN — PRAVASTATIN SODIUM 40 MG: 40 TABLET ORAL at 08:06

## 2021-06-23 LAB
ALBUMIN SERPL BCP-MCNC: 3.1 G/DL (ref 3.5–5.2)
ALP SERPL-CCNC: 59 U/L (ref 55–135)
ALT SERPL W/O P-5'-P-CCNC: 20 U/L (ref 10–44)
ANION GAP SERPL CALC-SCNC: 8 MMOL/L (ref 8–16)
AST SERPL-CCNC: 13 U/L (ref 10–40)
BACTERIA BLD CULT: NORMAL
BACTERIA BLD CULT: NORMAL
BASOPHILS # BLD AUTO: 0.01 K/UL (ref 0–0.2)
BASOPHILS NFR BLD: 0.1 % (ref 0–1.9)
BILIRUB DIRECT SERPL-MCNC: 0.2 MG/DL (ref 0.1–0.3)
BILIRUB SERPL-MCNC: 0.4 MG/DL (ref 0.1–1)
BUN SERPL-MCNC: 17 MG/DL (ref 6–20)
CALCIUM SERPL-MCNC: 8.4 MG/DL (ref 8.7–10.5)
CHLORIDE SERPL-SCNC: 104 MMOL/L (ref 95–110)
CO2 SERPL-SCNC: 24 MMOL/L (ref 23–29)
CREAT SERPL-MCNC: 0.8 MG/DL (ref 0.5–1.4)
CRP SERPL-MCNC: 0.9 MG/L (ref 0–8.2)
DIFFERENTIAL METHOD: ABNORMAL
EOSINOPHIL # BLD AUTO: 0.1 K/UL (ref 0–0.5)
EOSINOPHIL NFR BLD: 0.6 % (ref 0–8)
ERYTHROCYTE [DISTWIDTH] IN BLOOD BY AUTOMATED COUNT: 12.4 % (ref 11.5–14.5)
ERYTHROCYTE [SEDIMENTATION RATE] IN BLOOD BY WESTERGREN METHOD: <2 MM/HR (ref 0–23)
EST. GFR  (AFRICAN AMERICAN): >60 ML/MIN/1.73 M^2
EST. GFR  (NON AFRICAN AMERICAN): >60 ML/MIN/1.73 M^2
GLUCOSE SERPL-MCNC: 150 MG/DL (ref 70–110)
HCT VFR BLD AUTO: 42.6 % (ref 40–54)
HGB BLD-MCNC: 14.6 G/DL (ref 14–18)
IMM GRANULOCYTES # BLD AUTO: 0.12 K/UL (ref 0–0.04)
IMM GRANULOCYTES NFR BLD AUTO: 0.8 % (ref 0–0.5)
LYMPHOCYTES # BLD AUTO: 1.3 K/UL (ref 1–4.8)
LYMPHOCYTES NFR BLD: 8.5 % (ref 18–48)
MAGNESIUM SERPL-MCNC: 1.7 MG/DL (ref 1.6–2.6)
MCH RBC QN AUTO: 28.7 PG (ref 27–31)
MCHC RBC AUTO-ENTMCNC: 34.3 G/DL (ref 32–36)
MCV RBC AUTO: 84 FL (ref 82–98)
MONOCYTES # BLD AUTO: 1.1 K/UL (ref 0.3–1)
MONOCYTES NFR BLD: 7 % (ref 4–15)
NEUTROPHILS # BLD AUTO: 12.6 K/UL (ref 1.8–7.7)
NEUTROPHILS NFR BLD: 83 % (ref 38–73)
NRBC BLD-RTO: 0 /100 WBC
PHOSPHATE SERPL-MCNC: 3.4 MG/DL (ref 2.7–4.5)
PLATELET # BLD AUTO: 196 K/UL (ref 150–450)
PMV BLD AUTO: 10.6 FL (ref 9.2–12.9)
POCT GLUCOSE: 112 MG/DL (ref 70–110)
POTASSIUM SERPL-SCNC: 4.5 MMOL/L (ref 3.5–5.1)
PROCALCITONIN SERPL IA-MCNC: 0.02 NG/ML
PROT SERPL-MCNC: 5.4 G/DL (ref 6–8.4)
RBC # BLD AUTO: 5.09 M/UL (ref 4.6–6.2)
SODIUM SERPL-SCNC: 136 MMOL/L (ref 136–145)
TACROLIMUS BLD-MCNC: 10.2 NG/ML (ref 5–15)
TACROLIMUS, NORMALIZED: 8.9 NG/ML (ref 5–15)
WBC # BLD AUTO: 15.15 K/UL (ref 3.9–12.7)

## 2021-06-23 PROCEDURE — 80197 ASSAY OF TACROLIMUS: CPT | Performed by: INTERNAL MEDICINE

## 2021-06-23 PROCEDURE — 36514 PR THER APHERESIS,PLASMA PHERESIS: ICD-10-PCS | Mod: ,,, | Performed by: PATHOLOGY

## 2021-06-23 PROCEDURE — 25000003 PHARM REV CODE 250: Performed by: PATHOLOGY

## 2021-06-23 PROCEDURE — 85025 COMPLETE CBC W/AUTO DIFF WBC: CPT | Performed by: STUDENT IN AN ORGANIZED HEALTH CARE EDUCATION/TRAINING PROGRAM

## 2021-06-23 PROCEDURE — 36514 APHERESIS PLASMA: CPT | Mod: ,,, | Performed by: PATHOLOGY

## 2021-06-23 PROCEDURE — 36514 APHERESIS PLASMA: CPT

## 2021-06-23 PROCEDURE — 63600175 PHARM REV CODE 636 W HCPCS: Performed by: PATHOLOGY

## 2021-06-23 PROCEDURE — 25000003 PHARM REV CODE 250: Performed by: INTERNAL MEDICINE

## 2021-06-23 PROCEDURE — 80076 HEPATIC FUNCTION PANEL: CPT | Performed by: STUDENT IN AN ORGANIZED HEALTH CARE EDUCATION/TRAINING PROGRAM

## 2021-06-23 PROCEDURE — 80048 BASIC METABOLIC PNL TOTAL CA: CPT | Performed by: STUDENT IN AN ORGANIZED HEALTH CARE EDUCATION/TRAINING PROGRAM

## 2021-06-23 PROCEDURE — 27200188 HC TRANSDUCER, ART ADULT/PEDS

## 2021-06-23 PROCEDURE — 94761 N-INVAS EAR/PLS OXIMETRY MLT: CPT

## 2021-06-23 PROCEDURE — 20600001 HC STEP DOWN PRIVATE ROOM

## 2021-06-23 PROCEDURE — 36415 COLL VENOUS BLD VENIPUNCTURE: CPT | Performed by: STUDENT IN AN ORGANIZED HEALTH CARE EDUCATION/TRAINING PROGRAM

## 2021-06-23 PROCEDURE — 25000003 PHARM REV CODE 250: Performed by: PHYSICIAN ASSISTANT

## 2021-06-23 PROCEDURE — 25000003 PHARM REV CODE 250: Performed by: STUDENT IN AN ORGANIZED HEALTH CARE EDUCATION/TRAINING PROGRAM

## 2021-06-23 PROCEDURE — 63600175 PHARM REV CODE 636 W HCPCS: Performed by: INTERNAL MEDICINE

## 2021-06-23 PROCEDURE — 83735 ASSAY OF MAGNESIUM: CPT | Performed by: STUDENT IN AN ORGANIZED HEALTH CARE EDUCATION/TRAINING PROGRAM

## 2021-06-23 PROCEDURE — 99233 SBSQ HOSP IP/OBS HIGH 50: CPT | Mod: 95,,, | Performed by: NURSE PRACTITIONER

## 2021-06-23 PROCEDURE — P9045 ALBUMIN (HUMAN), 5%, 250 ML: HCPCS | Mod: JG | Performed by: PATHOLOGY

## 2021-06-23 PROCEDURE — 84100 ASSAY OF PHOSPHORUS: CPT | Performed by: STUDENT IN AN ORGANIZED HEALTH CARE EDUCATION/TRAINING PROGRAM

## 2021-06-23 PROCEDURE — 99233 PR SUBSEQUENT HOSPITAL CARE,LEVL III: ICD-10-PCS | Mod: 95,,, | Performed by: NURSE PRACTITIONER

## 2021-06-23 PROCEDURE — 84145 PROCALCITONIN (PCT): CPT | Performed by: STUDENT IN AN ORGANIZED HEALTH CARE EDUCATION/TRAINING PROGRAM

## 2021-06-23 PROCEDURE — 85652 RBC SED RATE AUTOMATED: CPT | Performed by: STUDENT IN AN ORGANIZED HEALTH CARE EDUCATION/TRAINING PROGRAM

## 2021-06-23 PROCEDURE — 86140 C-REACTIVE PROTEIN: CPT | Performed by: STUDENT IN AN ORGANIZED HEALTH CARE EDUCATION/TRAINING PROGRAM

## 2021-06-23 RX ORDER — DIPHENHYDRAMINE HYDROCHLORIDE 50 MG/ML
INJECTION INTRAMUSCULAR; INTRAVENOUS
Status: COMPLETED
Start: 2021-06-23 | End: 2021-06-23

## 2021-06-23 RX ORDER — ALBUMIN HUMAN 50 G/1000ML
100 SOLUTION INTRAVENOUS ONCE
Status: COMPLETED | OUTPATIENT
Start: 2021-06-24 | End: 2021-06-24

## 2021-06-23 RX ORDER — HEPARIN SODIUM 1000 [USP'U]/ML
3200 INJECTION, SOLUTION INTRAVENOUS; SUBCUTANEOUS ONCE
Status: COMPLETED | OUTPATIENT
Start: 2021-06-23 | End: 2021-06-23

## 2021-06-23 RX ORDER — HEPARIN SODIUM 1000 [USP'U]/ML
3200 INJECTION, SOLUTION INTRAVENOUS; SUBCUTANEOUS ONCE
Status: COMPLETED | OUTPATIENT
Start: 2021-06-24 | End: 2021-06-24

## 2021-06-23 RX ORDER — ALBUMIN HUMAN 50 G/1000ML
100 SOLUTION INTRAVENOUS ONCE
Status: COMPLETED | OUTPATIENT
Start: 2021-06-23 | End: 2021-06-23

## 2021-06-23 RX ORDER — DIPHENHYDRAMINE HYDROCHLORIDE 50 MG/ML
25 INJECTION INTRAMUSCULAR; INTRAVENOUS ONCE
Status: COMPLETED | OUTPATIENT
Start: 2021-06-23 | End: 2021-06-23

## 2021-06-23 RX ORDER — DIPHENHYDRAMINE HYDROCHLORIDE 50 MG/ML
25 INJECTION INTRAMUSCULAR; INTRAVENOUS ONCE
Status: COMPLETED | OUTPATIENT
Start: 2021-06-24 | End: 2021-06-24

## 2021-06-23 RX ORDER — FAMOTIDINE 20 MG/1
40 TABLET, FILM COATED ORAL NIGHTLY
Status: DISCONTINUED | OUTPATIENT
Start: 2021-06-23 | End: 2021-06-25 | Stop reason: HOSPADM

## 2021-06-23 RX ADMIN — TACROLIMUS 2 MG: 1 CAPSULE ORAL at 06:06

## 2021-06-23 RX ADMIN — TACROLIMUS 2 MG: 1 CAPSULE ORAL at 08:06

## 2021-06-23 RX ADMIN — ONDANSETRON 8 MG: 8 TABLET, ORALLY DISINTEGRATING ORAL at 12:06

## 2021-06-23 RX ADMIN — CALCIUM GLUCONATE 2000 MG: 98 INJECTION, SOLUTION INTRAVENOUS at 11:06

## 2021-06-23 RX ADMIN — PREDNISONE 15 MG: 5 TABLET ORAL at 08:06

## 2021-06-23 RX ADMIN — MYCOPHENOLATE MOFETIL 1500 MG: 200 POWDER, FOR SUSPENSION ORAL at 10:06

## 2021-06-23 RX ADMIN — ACETAMINOPHEN 650 MG: 325 TABLET ORAL at 10:06

## 2021-06-23 RX ADMIN — DIPHENHYDRAMINE HYDROCHLORIDE 25 MG: 50 INJECTION, SOLUTION INTRAMUSCULAR; INTRAVENOUS at 11:06

## 2021-06-23 RX ADMIN — NYSTATIN 500000 UNITS: 100000 SUSPENSION ORAL at 06:06

## 2021-06-23 RX ADMIN — ALBUMIN (HUMAN) 100 G: 12.5 SOLUTION INTRAVENOUS at 10:06

## 2021-06-23 RX ADMIN — PRAVASTATIN SODIUM 40 MG: 40 TABLET ORAL at 08:06

## 2021-06-23 RX ADMIN — FAMOTIDINE 40 MG: 20 TABLET ORAL at 08:06

## 2021-06-23 RX ADMIN — NYSTATIN 500000 UNITS: 100000 SUSPENSION ORAL at 12:06

## 2021-06-23 RX ADMIN — SULFAMETHOXAZOLE AND TRIMETHOPRIM 1 TABLET: 400; 80 TABLET ORAL at 08:06

## 2021-06-23 RX ADMIN — Medication 800 MG: at 10:06

## 2021-06-23 RX ADMIN — NYSTATIN 500000 UNITS: 100000 SUSPENSION ORAL at 08:06

## 2021-06-23 RX ADMIN — Medication 800 MG: at 06:06

## 2021-06-23 RX ADMIN — HEPARIN SODIUM 3200 UNITS: 1000 INJECTION, SOLUTION INTRAVENOUS; SUBCUTANEOUS at 12:06

## 2021-06-24 LAB
ANION GAP SERPL CALC-SCNC: 6 MMOL/L (ref 8–16)
ASCENDING AORTA: 2.92 CM
AV INDEX (PROSTH): 0.88
AV MEAN GRADIENT: 4 MMHG
AV PEAK GRADIENT: 5 MMHG
AV VALVE AREA: 2.68 CM2
AV VELOCITY RATIO: 0.88
BASOPHILS # BLD AUTO: 0.06 K/UL (ref 0–0.2)
BASOPHILS NFR BLD: 0.4 % (ref 0–1.9)
BSA FOR ECHO PROCEDURE: 1.8 M2
BUN SERPL-MCNC: 13 MG/DL (ref 6–20)
CALCIUM SERPL-MCNC: 8.8 MG/DL (ref 8.7–10.5)
CHLORIDE SERPL-SCNC: 104 MMOL/L (ref 95–110)
CO2 SERPL-SCNC: 28 MMOL/L (ref 23–29)
CREAT SERPL-MCNC: 0.7 MG/DL (ref 0.5–1.4)
CV ECHO LV RWT: 0.38 CM
DIFFERENTIAL METHOD: ABNORMAL
DOP CALC AO PEAK VEL: 1.15 M/S
DOP CALC AO VTI: 14.43 CM
DOP CALC LVOT AREA: 3 CM2
DOP CALC LVOT DIAMETER: 1.97 CM
DOP CALC LVOT PEAK VEL: 1.01 M/S
DOP CALC LVOT STROKE VOLUME: 38.69 CM3
DOP CALCLVOT PEAK VEL VTI: 12.7 CM
ECHO LV POSTERIOR WALL: 0.66 CM (ref 0.6–1.1)
EJECTION FRACTION: 60 %
EOSINOPHIL # BLD AUTO: 0.2 K/UL (ref 0–0.5)
EOSINOPHIL NFR BLD: 1.1 % (ref 0–8)
ERYTHROCYTE [DISTWIDTH] IN BLOOD BY AUTOMATED COUNT: 13.1 % (ref 11.5–14.5)
EST. GFR  (AFRICAN AMERICAN): >60 ML/MIN/1.73 M^2
EST. GFR  (NON AFRICAN AMERICAN): >60 ML/MIN/1.73 M^2
FRACTIONAL SHORTENING: 44 % (ref 28–44)
GLUCOSE SERPL-MCNC: 105 MG/DL (ref 70–110)
HCT VFR BLD AUTO: 45.5 % (ref 40–54)
HGB BLD-MCNC: 14.8 G/DL (ref 14–18)
IMM GRANULOCYTES # BLD AUTO: 0.29 K/UL (ref 0–0.04)
IMM GRANULOCYTES NFR BLD AUTO: 1.9 % (ref 0–0.5)
INTERVENTRICULAR SEPTUM: 0.78 CM (ref 0.6–1.1)
LEFT INTERNAL DIMENSION IN SYSTOLE: 1.97 CM (ref 2.1–4)
LEFT VENTRICLE DIASTOLIC VOLUME INDEX: 28.75 ML/M2
LEFT VENTRICLE DIASTOLIC VOLUME: 50.6 ML
LEFT VENTRICLE MASS INDEX: 37 G/M2
LEFT VENTRICLE SYSTOLIC VOLUME INDEX: 7 ML/M2
LEFT VENTRICLE SYSTOLIC VOLUME: 12.32 ML
LEFT VENTRICULAR INTERNAL DIMENSION IN DIASTOLE: 3.49 CM (ref 3.5–6)
LEFT VENTRICULAR MASS: 64.93 G
LYMPHOCYTES # BLD AUTO: 1.6 K/UL (ref 1–4.8)
LYMPHOCYTES NFR BLD: 10.8 % (ref 18–48)
MAGNESIUM SERPL-MCNC: 1.7 MG/DL (ref 1.6–2.6)
MCH RBC QN AUTO: 27.7 PG (ref 27–31)
MCHC RBC AUTO-ENTMCNC: 32.5 G/DL (ref 32–36)
MCV RBC AUTO: 85 FL (ref 82–98)
MONOCYTES # BLD AUTO: 1 K/UL (ref 0.3–1)
MONOCYTES NFR BLD: 6.3 % (ref 4–15)
NEUTROPHILS # BLD AUTO: 12.1 K/UL (ref 1.8–7.7)
NEUTROPHILS NFR BLD: 79.5 % (ref 38–73)
NRBC BLD-RTO: 0 /100 WBC
PHOSPHATE SERPL-MCNC: 3.3 MG/DL (ref 2.7–4.5)
PISA TR MAX VEL: 2.32 M/S
PLATELET # BLD AUTO: 201 K/UL (ref 150–450)
PMV BLD AUTO: 11 FL (ref 9.2–12.9)
POCT GLUCOSE: 120 MG/DL (ref 70–110)
POCT GLUCOSE: 155 MG/DL (ref 70–110)
POTASSIUM SERPL-SCNC: 4.6 MMOL/L (ref 3.5–5.1)
RA PRESSURE: 3 MMHG
RBC # BLD AUTO: 5.35 M/UL (ref 4.6–6.2)
SINUS: 2.76 CM
SODIUM SERPL-SCNC: 138 MMOL/L (ref 136–145)
STJ: 2.78 CM
TACROLIMUS BLD-MCNC: 11 NG/ML (ref 5–15)
TACROLIMUS, NORMALIZED: 9.6 NG/ML (ref 5–15)
TDI LATERAL: 0.15 M/S
TDI SEPTAL: 0.08 M/S
TDI: 0.12 M/S
TR MAX PG: 22 MMHG
TRICUSPID ANNULAR PLANE SYSTOLIC EXCURSION: 1.56 CM
TV REST PULMONARY ARTERY PRESSURE: 25 MMHG
WBC # BLD AUTO: 15.17 K/UL (ref 3.9–12.7)

## 2021-06-24 PROCEDURE — 63600175 PHARM REV CODE 636 W HCPCS: Performed by: INTERNAL MEDICINE

## 2021-06-24 PROCEDURE — 36514 APHERESIS PLASMA: CPT

## 2021-06-24 PROCEDURE — 25000003 PHARM REV CODE 250: Performed by: INTERNAL MEDICINE

## 2021-06-24 PROCEDURE — 99233 SBSQ HOSP IP/OBS HIGH 50: CPT | Mod: ,,, | Performed by: PHYSICIAN ASSISTANT

## 2021-06-24 PROCEDURE — 36514 APHERESIS PLASMA: CPT | Mod: ,,, | Performed by: PATHOLOGY

## 2021-06-24 PROCEDURE — 25000003 PHARM REV CODE 250: Performed by: STUDENT IN AN ORGANIZED HEALTH CARE EDUCATION/TRAINING PROGRAM

## 2021-06-24 PROCEDURE — 85025 COMPLETE CBC W/AUTO DIFF WBC: CPT | Performed by: STUDENT IN AN ORGANIZED HEALTH CARE EDUCATION/TRAINING PROGRAM

## 2021-06-24 PROCEDURE — 25000003 PHARM REV CODE 250: Performed by: PATHOLOGY

## 2021-06-24 PROCEDURE — P9045 ALBUMIN (HUMAN), 5%, 250 ML: HCPCS | Mod: JG | Performed by: PATHOLOGY

## 2021-06-24 PROCEDURE — 83735 ASSAY OF MAGNESIUM: CPT | Performed by: STUDENT IN AN ORGANIZED HEALTH CARE EDUCATION/TRAINING PROGRAM

## 2021-06-24 PROCEDURE — 20600001 HC STEP DOWN PRIVATE ROOM

## 2021-06-24 PROCEDURE — 80048 BASIC METABOLIC PNL TOTAL CA: CPT | Performed by: STUDENT IN AN ORGANIZED HEALTH CARE EDUCATION/TRAINING PROGRAM

## 2021-06-24 PROCEDURE — 99233 PR SUBSEQUENT HOSPITAL CARE,LEVL III: ICD-10-PCS | Mod: ,,, | Performed by: PHYSICIAN ASSISTANT

## 2021-06-24 PROCEDURE — 25000003 PHARM REV CODE 250: Performed by: PHYSICIAN ASSISTANT

## 2021-06-24 PROCEDURE — 80197 ASSAY OF TACROLIMUS: CPT | Performed by: INTERNAL MEDICINE

## 2021-06-24 PROCEDURE — 84100 ASSAY OF PHOSPHORUS: CPT | Performed by: STUDENT IN AN ORGANIZED HEALTH CARE EDUCATION/TRAINING PROGRAM

## 2021-06-24 PROCEDURE — 36514 PR THER APHERESIS,PLASMA PHERESIS: ICD-10-PCS | Mod: ,,, | Performed by: PATHOLOGY

## 2021-06-24 PROCEDURE — 63600175 PHARM REV CODE 636 W HCPCS: Mod: JG | Performed by: PATHOLOGY

## 2021-06-24 RX ORDER — ACETAMINOPHEN 650 MG/20.3ML
650 LIQUID ORAL ONCE
Status: COMPLETED | OUTPATIENT
Start: 2021-06-24 | End: 2021-06-24

## 2021-06-24 RX ORDER — DIPHENHYDRAMINE HCL 12.5MG/5ML
25 ELIXIR ORAL ONCE
Status: COMPLETED | OUTPATIENT
Start: 2021-06-25 | End: 2021-06-25

## 2021-06-24 RX ORDER — DIPHENHYDRAMINE HCL 12.5MG/5ML
25 ELIXIR ORAL ONCE
Status: COMPLETED | OUTPATIENT
Start: 2021-06-24 | End: 2021-06-24

## 2021-06-24 RX ORDER — SODIUM CHLORIDE 9 MG/ML
INJECTION, SOLUTION INTRAVENOUS CONTINUOUS PRN
Status: DISCONTINUED | OUTPATIENT
Start: 2021-06-24 | End: 2021-06-25 | Stop reason: HOSPADM

## 2021-06-24 RX ORDER — ACETAMINOPHEN 650 MG/20.3ML
650 LIQUID ORAL ONCE
Status: COMPLETED | OUTPATIENT
Start: 2021-06-25 | End: 2021-06-25

## 2021-06-24 RX ORDER — DEXTROSE MONOHYDRATE 50 MG/ML
INJECTION, SOLUTION INTRAVENOUS CONTINUOUS PRN
Status: DISCONTINUED | OUTPATIENT
Start: 2021-06-24 | End: 2021-06-25 | Stop reason: HOSPADM

## 2021-06-24 RX ADMIN — MELATONIN TAB 3 MG 6 MG: 3 TAB at 09:06

## 2021-06-24 RX ADMIN — FAMOTIDINE 40 MG: 20 TABLET ORAL at 09:06

## 2021-06-24 RX ADMIN — ACETAMINOPHEN ORAL SOLUTION 650 MG: 650 SOLUTION ORAL at 02:06

## 2021-06-24 RX ADMIN — HUMAN IMMUNOGLOBULIN G 60 G: 40 LIQUID INTRAVENOUS at 04:06

## 2021-06-24 RX ADMIN — HEPARIN SODIUM 3200 UNITS: 1000 INJECTION, SOLUTION INTRAVENOUS; SUBCUTANEOUS at 09:06

## 2021-06-24 RX ADMIN — TACROLIMUS 2 MG: 1 CAPSULE ORAL at 08:06

## 2021-06-24 RX ADMIN — MYCOPHENOLATE MOFETIL 1500 MG: 200 POWDER, FOR SUSPENSION ORAL at 10:06

## 2021-06-24 RX ADMIN — CALCIUM GLUCONATE 4000 MG: 98 INJECTION, SOLUTION INTRAVENOUS at 08:06

## 2021-06-24 RX ADMIN — NYSTATIN 500000 UNITS: 100000 SUSPENSION ORAL at 09:06

## 2021-06-24 RX ADMIN — METHYLPREDNISOLONE SODIUM SUCCINATE 40 MG: 40 INJECTION, POWDER, FOR SOLUTION INTRAMUSCULAR; INTRAVENOUS at 02:06

## 2021-06-24 RX ADMIN — SULFAMETHOXAZOLE AND TRIMETHOPRIM 1 TABLET: 400; 80 TABLET ORAL at 10:06

## 2021-06-24 RX ADMIN — NYSTATIN 500000 UNITS: 100000 SUSPENSION ORAL at 02:06

## 2021-06-24 RX ADMIN — NYSTATIN 500000 UNITS: 100000 SUSPENSION ORAL at 10:06

## 2021-06-24 RX ADMIN — NYSTATIN 500000 UNITS: 100000 SUSPENSION ORAL at 06:06

## 2021-06-24 RX ADMIN — PRAVASTATIN SODIUM 40 MG: 40 TABLET ORAL at 09:06

## 2021-06-24 RX ADMIN — PREDNISONE 20 MG: 20 TABLET ORAL at 10:06

## 2021-06-24 RX ADMIN — DIPHENHYDRAMINE HYDROCHLORIDE 25 MG: 25 SOLUTION ORAL at 02:06

## 2021-06-24 RX ADMIN — ALBUMIN (HUMAN) 100 G: 12.5 SOLUTION INTRAVENOUS at 08:06

## 2021-06-24 RX ADMIN — TACROLIMUS 2 MG: 1 CAPSULE ORAL at 06:06

## 2021-06-24 RX ADMIN — MYCOPHENOLATE MOFETIL 1500 MG: 200 POWDER, FOR SUSPENSION ORAL at 09:06

## 2021-06-24 RX ADMIN — DIPHENHYDRAMINE HYDROCHLORIDE 25 MG: 50 INJECTION, SOLUTION INTRAMUSCULAR; INTRAVENOUS at 08:06

## 2021-06-25 VITALS
TEMPERATURE: 99 F | HEIGHT: 63 IN | WEIGHT: 164.44 LBS | SYSTOLIC BLOOD PRESSURE: 106 MMHG | DIASTOLIC BLOOD PRESSURE: 58 MMHG | BODY MASS INDEX: 29.14 KG/M2 | OXYGEN SATURATION: 95 % | RESPIRATION RATE: 17 BRPM | HEART RATE: 114 BPM

## 2021-06-25 LAB
ALBUMIN SERPL BCP-MCNC: 3.6 G/DL (ref 3.5–5.2)
ALP SERPL-CCNC: 39 U/L (ref 55–135)
ALT SERPL W/O P-5'-P-CCNC: 24 U/L (ref 10–44)
ANION GAP SERPL CALC-SCNC: 8 MMOL/L (ref 8–16)
AST SERPL-CCNC: 13 U/L (ref 10–40)
BASOPHILS # BLD AUTO: 0.09 K/UL (ref 0–0.2)
BASOPHILS NFR BLD: 0.6 % (ref 0–1.9)
BILIRUB DIRECT SERPL-MCNC: 0.2 MG/DL (ref 0.1–0.3)
BILIRUB SERPL-MCNC: 0.4 MG/DL (ref 0.1–1)
BUN SERPL-MCNC: 15 MG/DL (ref 6–20)
C1Q1 TESTING DATE: NORMAL
C1Q2 TESTING DATE: NORMAL
CALCIUM SERPL-MCNC: 8.5 MG/DL (ref 8.7–10.5)
CHLORIDE SERPL-SCNC: 103 MMOL/L (ref 95–110)
CLASS I ANTIBODY COMMENTS - LUMINEX: NORMAL
CLASS II ANTIBODIES - LUMINEX: NORMAL
CLASS II ANTIBODY COMMENTS - LUMINEX: NORMAL
CO2 SERPL-SCNC: 24 MMOL/L (ref 23–29)
CREAT SERPL-MCNC: 0.8 MG/DL (ref 0.5–1.4)
DIFFERENTIAL METHOD: ABNORMAL
EOSINOPHIL # BLD AUTO: 0.1 K/UL (ref 0–0.5)
EOSINOPHIL NFR BLD: 1 % (ref 0–8)
ERYTHROCYTE [DISTWIDTH] IN BLOOD BY AUTOMATED COUNT: 13.2 % (ref 11.5–14.5)
EST. GFR  (AFRICAN AMERICAN): >60 ML/MIN/1.73 M^2
EST. GFR  (NON AFRICAN AMERICAN): >60 ML/MIN/1.73 M^2
GLUCOSE SERPL-MCNC: 102 MG/DL (ref 70–110)
HC1Q TESTING DATE: NORMAL
HCT VFR BLD AUTO: 42.6 % (ref 40–54)
HGB BLD-MCNC: 14.1 G/DL (ref 14–18)
IMM GRANULOCYTES # BLD AUTO: 0.54 K/UL (ref 0–0.04)
IMM GRANULOCYTES NFR BLD AUTO: 3.8 % (ref 0–0.5)
LYMPHOCYTES # BLD AUTO: 1.1 K/UL (ref 1–4.8)
LYMPHOCYTES NFR BLD: 7.7 % (ref 18–48)
MAGNESIUM SERPL-MCNC: 1.6 MG/DL (ref 1.6–2.6)
MCH RBC QN AUTO: 27.9 PG (ref 27–31)
MCHC RBC AUTO-ENTMCNC: 33.1 G/DL (ref 32–36)
MCV RBC AUTO: 84 FL (ref 82–98)
MONOCYTES # BLD AUTO: 1.5 K/UL (ref 0.3–1)
MONOCYTES NFR BLD: 10.4 % (ref 4–15)
NEUTROPHILS # BLD AUTO: 10.8 K/UL (ref 1.8–7.7)
NEUTROPHILS NFR BLD: 76.5 % (ref 38–73)
NRBC BLD-RTO: 0 /100 WBC
PHOSPHATE SERPL-MCNC: 4.2 MG/DL (ref 2.7–4.5)
PLATELET # BLD AUTO: 215 K/UL (ref 150–450)
PMV BLD AUTO: 10.4 FL (ref 9.2–12.9)
POTASSIUM SERPL-SCNC: 4.5 MMOL/L (ref 3.5–5.1)
PROT SERPL-MCNC: 6.8 G/DL (ref 6–8.4)
RBC # BLD AUTO: 5.06 M/UL (ref 4.6–6.2)
S PNEUM AG UR QL: NOT DETECTED
SERUM COLLECTION DT - LUMINEX CLASS I: NORMAL
SERUM COLLECTION DT - LUMINEX CLASS II: NORMAL
SODIUM SERPL-SCNC: 135 MMOL/L (ref 136–145)
TACROLIMUS BLD-MCNC: 14.5 NG/ML (ref 5–15)
TACROLIMUS, NORMALIZED: 12.5 NG/ML (ref 5–15)
WBC # BLD AUTO: 14.1 K/UL (ref 3.9–12.7)

## 2021-06-25 PROCEDURE — 99233 PR SUBSEQUENT HOSPITAL CARE,LEVL III: ICD-10-PCS | Mod: ,,, | Performed by: INTERNAL MEDICINE

## 2021-06-25 PROCEDURE — 80048 BASIC METABOLIC PNL TOTAL CA: CPT | Performed by: STUDENT IN AN ORGANIZED HEALTH CARE EDUCATION/TRAINING PROGRAM

## 2021-06-25 PROCEDURE — 25000003 PHARM REV CODE 250: Performed by: INTERNAL MEDICINE

## 2021-06-25 PROCEDURE — 63600175 PHARM REV CODE 636 W HCPCS: Performed by: INTERNAL MEDICINE

## 2021-06-25 PROCEDURE — 84100 ASSAY OF PHOSPHORUS: CPT | Performed by: STUDENT IN AN ORGANIZED HEALTH CARE EDUCATION/TRAINING PROGRAM

## 2021-06-25 PROCEDURE — 83735 ASSAY OF MAGNESIUM: CPT | Performed by: STUDENT IN AN ORGANIZED HEALTH CARE EDUCATION/TRAINING PROGRAM

## 2021-06-25 PROCEDURE — 80076 HEPATIC FUNCTION PANEL: CPT | Performed by: STUDENT IN AN ORGANIZED HEALTH CARE EDUCATION/TRAINING PROGRAM

## 2021-06-25 PROCEDURE — 85025 COMPLETE CBC W/AUTO DIFF WBC: CPT | Performed by: STUDENT IN AN ORGANIZED HEALTH CARE EDUCATION/TRAINING PROGRAM

## 2021-06-25 PROCEDURE — 99233 SBSQ HOSP IP/OBS HIGH 50: CPT | Mod: ,,, | Performed by: INTERNAL MEDICINE

## 2021-06-25 PROCEDURE — 25000003 PHARM REV CODE 250: Performed by: STUDENT IN AN ORGANIZED HEALTH CARE EDUCATION/TRAINING PROGRAM

## 2021-06-25 PROCEDURE — 97530 THERAPEUTIC ACTIVITIES: CPT

## 2021-06-25 PROCEDURE — 80197 ASSAY OF TACROLIMUS: CPT | Performed by: INTERNAL MEDICINE

## 2021-06-25 RX ORDER — MEPERIDINE HYDROCHLORIDE 50 MG/ML
25 INJECTION INTRAMUSCULAR; INTRAVENOUS; SUBCUTANEOUS
Status: CANCELLED | OUTPATIENT
Start: 2021-07-12

## 2021-06-25 RX ORDER — HEPARIN 100 UNIT/ML
500 SYRINGE INTRAVENOUS
Status: CANCELLED | OUTPATIENT
Start: 2021-07-12

## 2021-06-25 RX ORDER — PREDNISONE 10 MG/1
10 TABLET ORAL DAILY
Status: DISCONTINUED | OUTPATIENT
Start: 2021-06-25 | End: 2021-06-25 | Stop reason: HOSPADM

## 2021-06-25 RX ORDER — ACETAMINOPHEN 325 MG/1
650 TABLET ORAL
Status: CANCELLED | OUTPATIENT
Start: 2021-07-12

## 2021-06-25 RX ORDER — PREDNISONE 5 MG/1
10 TABLET ORAL DAILY
Qty: 60 TABLET | Refills: 2 | Status: ON HOLD | OUTPATIENT
Start: 2021-06-25 | End: 2021-07-19 | Stop reason: SDUPTHER

## 2021-06-25 RX ORDER — LANOLIN ALCOHOL/MO/W.PET/CERES
400 CREAM (GRAM) TOPICAL 2 TIMES DAILY
Qty: 60 TABLET | Refills: 5 | Status: ON HOLD | OUTPATIENT
Start: 2021-06-25 | End: 2021-07-19 | Stop reason: SDUPTHER

## 2021-06-25 RX ORDER — DIPHENHYDRAMINE HCL 50 MG
50 CAPSULE ORAL
Status: CANCELLED | OUTPATIENT
Start: 2021-07-12

## 2021-06-25 RX ORDER — TACROLIMUS 1 MG/1
2 CAPSULE ORAL EVERY 12 HOURS
Qty: 120 CAPSULE | Refills: 11 | Status: ON HOLD | OUTPATIENT
Start: 2021-06-25 | End: 2021-07-19 | Stop reason: SDUPTHER

## 2021-06-25 RX ORDER — SULFAMETHOXAZOLE AND TRIMETHOPRIM 400; 80 MG/1; MG/1
1 TABLET ORAL DAILY
Qty: 90 TABLET | Refills: 1 | Status: ON HOLD | OUTPATIENT
Start: 2021-06-26 | End: 2021-07-19 | Stop reason: SDUPTHER

## 2021-06-25 RX ORDER — METHYLPREDNISOLONE SOD SUCC 125 MG
40 VIAL (EA) INJECTION
Status: CANCELLED | OUTPATIENT
Start: 2021-07-12

## 2021-06-25 RX ORDER — SODIUM CHLORIDE 0.9 % (FLUSH) 0.9 %
10 SYRINGE (ML) INJECTION
Status: CANCELLED | OUTPATIENT
Start: 2021-07-12

## 2021-06-25 RX ORDER — PRAVASTATIN SODIUM 40 MG/1
40 TABLET ORAL DAILY
Qty: 90 TABLET | Refills: 3 | Status: ON HOLD | OUTPATIENT
Start: 2021-06-25 | End: 2021-07-19 | Stop reason: SDUPTHER

## 2021-06-25 RX ADMIN — ACETAMINOPHEN ORAL SOLUTION 650 MG: 650 SOLUTION ORAL at 08:06

## 2021-06-25 RX ADMIN — PREDNISONE 10 MG: 10 TABLET ORAL at 12:06

## 2021-06-25 RX ADMIN — SULFAMETHOXAZOLE AND TRIMETHOPRIM 1 TABLET: 400; 80 TABLET ORAL at 08:06

## 2021-06-25 RX ADMIN — TACROLIMUS 2 MG: 1 CAPSULE ORAL at 08:06

## 2021-06-25 RX ADMIN — NYSTATIN 500000 UNITS: 100000 SUSPENSION ORAL at 08:06

## 2021-06-25 RX ADMIN — NYSTATIN 500000 UNITS: 100000 SUSPENSION ORAL at 12:06

## 2021-06-25 RX ADMIN — HUMAN IMMUNOGLOBULIN G 60 G: 40 LIQUID INTRAVENOUS at 09:06

## 2021-06-25 RX ADMIN — ERGOCALCIFEROL 50000 UNITS: 1.25 CAPSULE ORAL at 08:06

## 2021-06-25 RX ADMIN — MYCOPHENOLATE MOFETIL 1500 MG: 200 POWDER, FOR SUSPENSION ORAL at 08:06

## 2021-06-25 RX ADMIN — DIPHENHYDRAMINE HYDROCHLORIDE 25 MG: 25 SOLUTION ORAL at 08:06

## 2021-07-12 ENCOUNTER — TELEPHONE (OUTPATIENT)
Dept: TRANSPLANT | Facility: CLINIC | Age: 21
End: 2021-07-12

## 2021-07-14 ENCOUNTER — PATIENT MESSAGE (OUTPATIENT)
Dept: TRANSPLANT | Facility: CLINIC | Age: 21
End: 2021-07-14

## 2021-07-15 ENCOUNTER — TELEPHONE (OUTPATIENT)
Dept: TRANSPLANT | Facility: HOSPITAL | Age: 21
End: 2021-07-15

## 2021-07-15 ENCOUNTER — PATIENT MESSAGE (OUTPATIENT)
Dept: TRANSPLANT | Facility: CLINIC | Age: 21
End: 2021-07-15

## 2021-07-16 ENCOUNTER — HISTORICAL (OUTPATIENT)
Dept: LAB | Facility: HOSPITAL | Age: 21
End: 2021-07-16

## 2021-07-16 LAB
ABS NEUT (OLG): 4.39 X10(3)/MCL (ref 2.1–9.2)
ALBUMIN SERPL-MCNC: 4.1 GM/DL (ref 3.5–5)
ALBUMIN/GLOB SERPL: 1.1 RATIO (ref 1.1–2)
ALP SERPL-CCNC: 99 UNIT/L (ref 40–150)
ALT SERPL-CCNC: 23 UNIT/L (ref 0–55)
AST SERPL-CCNC: 15 UNIT/L (ref 5–34)
BILIRUB SERPL-MCNC: 0.8 MG/DL (ref 0.2–1.2)
BILIRUBIN DIRECT+TOT PNL SERPL-MCNC: 0.3 MG/DL (ref 0–0.5)
BILIRUBIN DIRECT+TOT PNL SERPL-MCNC: 0.5 MG/DL (ref 0–0.8)
BNP BLD-MCNC: 65.3 PG/ML
BUN SERPL-MCNC: 12.5 MG/DL (ref 8.9–20.6)
CALCIUM SERPL-MCNC: 9.6 MG/DL (ref 8.4–10.2)
CHLORIDE SERPL-SCNC: 106 MMOL/L (ref 98–107)
CHOLEST SERPL-MCNC: 150 MG/DL
CHOLEST/HDLC SERPL: 3 {RATIO} (ref 0–5)
CO2 SERPL-SCNC: 24 MMOL/L (ref 22–29)
CREAT SERPL-MCNC: 0.92 MG/DL (ref 0.72–1.25)
ERYTHROCYTE [DISTWIDTH] IN BLOOD BY AUTOMATED COUNT: 12.6 % (ref 11.5–17)
GLOBULIN SER-MCNC: 3.8 GM/DL (ref 2.4–3.5)
GLUCOSE SERPL-MCNC: 92 MG/DL (ref 74–100)
HCT VFR BLD AUTO: 48.1 % (ref 42–52)
HDLC SERPL-MCNC: 46 MG/DL (ref 40–60)
HGB BLD-MCNC: 15.7 GM/DL (ref 14–18)
LDLC SERPL CALC-MCNC: 82 MG/DL (ref 50–140)
MAGNESIUM SERPL-MCNC: 1.4 MG/DL (ref 1.6–2.6)
MCH RBC QN AUTO: 27.3 PG (ref 27–31)
MCHC RBC AUTO-ENTMCNC: 32.6 GM/DL (ref 33–36)
MCV RBC AUTO: 83.5 FL (ref 80–94)
NRBC BLD AUTO-RTO: 0 % (ref 0–0.2)
PLATELET # BLD AUTO: 242 X10(3)/MCL (ref 130–400)
PMV BLD AUTO: 10.4 FL (ref 7.4–10.4)
POTASSIUM SERPL-SCNC: 3.9 MMOL/L (ref 3.5–5.1)
PROT SERPL-MCNC: 7.9 GM/DL (ref 6.4–8.3)
RBC # BLD AUTO: 5.76 X10(6)/MCL (ref 4.7–6.1)
SODIUM SERPL-SCNC: 142 MMOL/L (ref 136–145)
TRIGL SERPL-MCNC: 110 MG/DL (ref 0–150)
VLDLC SERPL CALC-MCNC: 22 MG/DL
WBC # SPEC AUTO: 8.2 X10(3)/MCL (ref 4.5–11.5)

## 2021-07-19 ENCOUNTER — HOSPITAL ENCOUNTER (INPATIENT)
Facility: HOSPITAL | Age: 21
LOS: 4 days | Discharge: HOME | DRG: 580 | End: 2021-07-23
Attending: INTERNAL MEDICINE | Admitting: INTERNAL MEDICINE
Payer: MEDICAID

## 2021-07-19 ENCOUNTER — PATIENT MESSAGE (OUTPATIENT)
Dept: TRANSPLANT | Facility: HOSPITAL | Age: 21
End: 2021-07-19

## 2021-07-19 ENCOUNTER — CLINICAL SUPPORT (OUTPATIENT)
Dept: TRANSPLANT | Facility: CLINIC | Age: 21
DRG: 580 | End: 2021-07-19
Payer: MEDICAID

## 2021-07-19 ENCOUNTER — TELEPHONE (OUTPATIENT)
Dept: TRANSPLANT | Facility: HOSPITAL | Age: 21
End: 2021-07-19

## 2021-07-19 DIAGNOSIS — R10.9 ABDOMINAL PAIN: ICD-10-CM

## 2021-07-19 DIAGNOSIS — R14.0 ABDOMINAL DISTENSION (GASEOUS): ICD-10-CM

## 2021-07-19 DIAGNOSIS — Z79.52 LONG TERM CURRENT USE OF SYSTEMIC STEROIDS: ICD-10-CM

## 2021-07-19 DIAGNOSIS — R10.12 LEFT UPPER QUADRANT PAIN: ICD-10-CM

## 2021-07-19 DIAGNOSIS — Z94.1 HEART TRANSPLANTED: ICD-10-CM

## 2021-07-19 DIAGNOSIS — K31.6 GASTROCUTANEOUS FISTULA DUE TO GASTROSTOMY TUBE: ICD-10-CM

## 2021-07-19 DIAGNOSIS — R94.31 ABNORMAL EKG: ICD-10-CM

## 2021-07-19 DIAGNOSIS — Z94.1 STATUS POST HEART TRANSPLANT: ICD-10-CM

## 2021-07-19 DIAGNOSIS — Z79.899 ENCOUNTER FOR LONG-TERM (CURRENT) USE OF MEDICATIONS: ICD-10-CM

## 2021-07-19 DIAGNOSIS — Z91.148 NONCOMPLIANCE WITH MEDICATIONS: ICD-10-CM

## 2021-07-19 DIAGNOSIS — L02.211 ABSCESS OF ABDOMINAL WALL: Primary | ICD-10-CM

## 2021-07-19 DIAGNOSIS — Z94.1 S/P ORTHOTOPIC HEART TRANSPLANT: ICD-10-CM

## 2021-07-19 DIAGNOSIS — Z13.9 SCREENING FOR UNSPECIFIED CONDITION: Primary | ICD-10-CM

## 2021-07-19 DIAGNOSIS — E55.9 VITAMIN D DEFICIENCY: ICD-10-CM

## 2021-07-19 DIAGNOSIS — T86.21 HEART TRANSPLANT REJECTION: ICD-10-CM

## 2021-07-19 DIAGNOSIS — R10.12 LEFT UPPER QUADRANT ABDOMINAL PAIN: ICD-10-CM

## 2021-07-19 DIAGNOSIS — I10 ESSENTIAL HYPERTENSION: ICD-10-CM

## 2021-07-19 DIAGNOSIS — Z94.1 HEART REPLACED BY TRANSPLANT: ICD-10-CM

## 2021-07-19 DIAGNOSIS — T86.21 CARDIAC TRANSPLANT REJECTION: ICD-10-CM

## 2021-07-19 DIAGNOSIS — T86.20 COMPLICATION OF HEART TRANSPLANT, UNSPECIFIED COMPLICATION: ICD-10-CM

## 2021-07-19 LAB
ALBUMIN SERPL BCP-MCNC: 4.2 G/DL (ref 3.5–5.2)
ALP SERPL-CCNC: 107 U/L (ref 55–135)
ALT SERPL W/O P-5'-P-CCNC: 19 U/L (ref 10–44)
ANION GAP SERPL CALC-SCNC: 9 MMOL/L (ref 8–16)
AST SERPL-CCNC: 15 U/L (ref 10–40)
BASOPHILS # BLD AUTO: 0.02 K/UL (ref 0–0.2)
BASOPHILS NFR BLD: 0.2 % (ref 0–1.9)
BILIRUB SERPL-MCNC: 0.7 MG/DL (ref 0.1–1)
BNP (B-TYPE NATRIURETIC PEP): 65.3
BUN SERPL-MCNC: 15 MG/DL (ref 6–20)
CALCIUM SERPL-MCNC: 10 MG/DL (ref 8.7–10.5)
CHLORIDE SERPL-SCNC: 105 MMOL/L (ref 95–110)
CO2 SERPL-SCNC: 25 MMOL/L (ref 23–29)
CREAT SERPL-MCNC: 1.2 MG/DL (ref 0.5–1.4)
DIFFERENTIAL METHOD: ABNORMAL
EOSINOPHIL # BLD AUTO: 0.1 K/UL (ref 0–0.5)
EOSINOPHIL NFR BLD: 0.7 % (ref 0–8)
ERYTHROCYTE [DISTWIDTH] IN BLOOD BY AUTOMATED COUNT: 12.7 % (ref 11.5–14.5)
EST. GFR  (AFRICAN AMERICAN): >60 ML/MIN/1.73 M^2
EST. GFR  (NON AFRICAN AMERICAN): >60 ML/MIN/1.73 M^2
EXT ALBUMIN: 4.1
EXT ALT: 17
EXT AST: 15
EXT BUN: 12.5
EXT CALCIUM: 9.6
EXT CHLORIDE: 106
EXT CHOLESTEROL (LIPID PANEL): 150
EXT CREATININE: 0.92 MG/DL
EXT GLUCOSE: 92
EXT HDL: 46
EXT HEMATOCRIT: 48.1
EXT HEMOGLOBIN: 15.7
EXT LDL CHOLESTEROL: 82
EXT MAGNESIUM: 1.4
EXT PLATELETS: 242
EXT POTASSIUM: 3.9
EXT PROTEIN TOTAL: 7.9
EXT SODIUM: 142 MMOL/L
EXT TRIGLYCERIDES: 110
EXT WBC: 8.2
GLUCOSE SERPL-MCNC: 107 MG/DL (ref 70–110)
HCT VFR BLD AUTO: 45.7 % (ref 40–54)
HGB BLD-MCNC: 15.3 G/DL (ref 14–18)
IMM GRANULOCYTES # BLD AUTO: 0.04 K/UL (ref 0–0.04)
IMM GRANULOCYTES NFR BLD AUTO: 0.4 % (ref 0–0.5)
LYMPHOCYTES # BLD AUTO: 1.7 K/UL (ref 1–4.8)
LYMPHOCYTES NFR BLD: 16.2 % (ref 18–48)
MAGNESIUM SERPL-MCNC: 1.6 MG/DL (ref 1.6–2.6)
MCH RBC QN AUTO: 27.9 PG (ref 27–31)
MCHC RBC AUTO-ENTMCNC: 33.5 G/DL (ref 32–36)
MCV RBC AUTO: 83 FL (ref 82–98)
MONOCYTES # BLD AUTO: 0.8 K/UL (ref 0.3–1)
MONOCYTES NFR BLD: 8 % (ref 4–15)
NEUTROPHILS # BLD AUTO: 7.7 K/UL (ref 1.8–7.7)
NEUTROPHILS NFR BLD: 74.5 % (ref 38–73)
NRBC BLD-RTO: 0 /100 WBC
PLATELET # BLD AUTO: 207 K/UL (ref 150–450)
PMV BLD AUTO: 10.8 FL (ref 9.2–12.9)
POCT GLUCOSE: 111 MG/DL (ref 70–110)
POTASSIUM SERPL-SCNC: 5 MMOL/L (ref 3.5–5.1)
PROT SERPL-MCNC: 8 G/DL (ref 6–8.4)
RBC # BLD AUTO: 5.48 M/UL (ref 4.6–6.2)
SARS-COV-2 RDRP RESP QL NAA+PROBE: NEGATIVE
SODIUM SERPL-SCNC: 139 MMOL/L (ref 136–145)
WBC # BLD AUTO: 10.37 K/UL (ref 3.9–12.7)

## 2021-07-19 PROCEDURE — U0002 COVID-19 LAB TEST NON-CDC: HCPCS | Performed by: INTERNAL MEDICINE

## 2021-07-19 PROCEDURE — 99233 PR SUBSEQUENT HOSPITAL CARE,LEVL III: ICD-10-PCS | Mod: ,,, | Performed by: INTERNAL MEDICINE

## 2021-07-19 PROCEDURE — 36415 COLL VENOUS BLD VENIPUNCTURE: CPT | Performed by: NURSE PRACTITIONER

## 2021-07-19 PROCEDURE — 99233 SBSQ HOSP IP/OBS HIGH 50: CPT | Mod: ,,, | Performed by: INTERNAL MEDICINE

## 2021-07-19 PROCEDURE — 25000003 PHARM REV CODE 250: Performed by: NURSE PRACTITIONER

## 2021-07-19 PROCEDURE — 63600175 PHARM REV CODE 636 W HCPCS: Performed by: NURSE PRACTITIONER

## 2021-07-19 PROCEDURE — 85025 COMPLETE CBC W/AUTO DIFF WBC: CPT | Performed by: NURSE PRACTITIONER

## 2021-07-19 PROCEDURE — G0378 HOSPITAL OBSERVATION PER HR: HCPCS

## 2021-07-19 PROCEDURE — 25000003 PHARM REV CODE 250: Performed by: STUDENT IN AN ORGANIZED HEALTH CARE EDUCATION/TRAINING PROGRAM

## 2021-07-19 PROCEDURE — 20600001 HC STEP DOWN PRIVATE ROOM

## 2021-07-19 PROCEDURE — 80053 COMPREHEN METABOLIC PANEL: CPT | Performed by: NURSE PRACTITIONER

## 2021-07-19 PROCEDURE — 83735 ASSAY OF MAGNESIUM: CPT | Performed by: NURSE PRACTITIONER

## 2021-07-19 PROCEDURE — G0379 DIRECT REFER HOSPITAL OBSERV: HCPCS

## 2021-07-19 RX ORDER — LANOLIN ALCOHOL/MO/W.PET/CERES
400 CREAM (GRAM) TOPICAL 2 TIMES DAILY
Status: DISCONTINUED | OUTPATIENT
Start: 2021-07-19 | End: 2021-07-23

## 2021-07-19 RX ORDER — TACROLIMUS 1 MG/1
2 CAPSULE ORAL 2 TIMES DAILY
Status: DISCONTINUED | OUTPATIENT
Start: 2021-07-19 | End: 2021-07-20

## 2021-07-19 RX ORDER — MYCOPHENOLATE MOFETIL 200 MG/ML
1500 POWDER, FOR SUSPENSION ORAL 2 TIMES DAILY
Status: DISCONTINUED | OUTPATIENT
Start: 2021-07-19 | End: 2021-07-23 | Stop reason: HOSPADM

## 2021-07-19 RX ORDER — VANCOMYCIN HCL IN 5 % DEXTROSE 1G/250ML
1000 PLASTIC BAG, INJECTION (ML) INTRAVENOUS
Status: DISCONTINUED | OUTPATIENT
Start: 2021-07-20 | End: 2021-07-20

## 2021-07-19 RX ORDER — NAPROXEN SODIUM 220 MG/1
81 TABLET, FILM COATED ORAL DAILY
Status: DISCONTINUED | OUTPATIENT
Start: 2021-07-20 | End: 2021-07-23 | Stop reason: HOSPADM

## 2021-07-19 RX ORDER — LIDOCAINE HYDROCHLORIDE 10 MG/ML
10 INJECTION, SOLUTION EPIDURAL; INFILTRATION; INTRACAUDAL; PERINEURAL ONCE
Status: COMPLETED | OUTPATIENT
Start: 2021-07-19 | End: 2021-07-19

## 2021-07-19 RX ORDER — CHOLECALCIFEROL (VITAMIN D3) 25 MCG
1000 TABLET ORAL DAILY
Status: DISCONTINUED | OUTPATIENT
Start: 2021-07-20 | End: 2021-07-23 | Stop reason: HOSPADM

## 2021-07-19 RX ORDER — CEFEPIME HYDROCHLORIDE 1 G/1
1 INJECTION, POWDER, FOR SOLUTION INTRAMUSCULAR; INTRAVENOUS
Status: DISCONTINUED | OUTPATIENT
Start: 2021-07-20 | End: 2021-07-20

## 2021-07-19 RX ORDER — PREDNISONE 10 MG/1
10 TABLET ORAL DAILY
Status: DISCONTINUED | OUTPATIENT
Start: 2021-07-20 | End: 2021-07-23 | Stop reason: HOSPADM

## 2021-07-19 RX ORDER — PRAVASTATIN SODIUM 40 MG/1
40 TABLET ORAL DAILY
Status: DISCONTINUED | OUTPATIENT
Start: 2021-07-20 | End: 2021-07-23 | Stop reason: HOSPADM

## 2021-07-19 RX ORDER — SULFAMETHOXAZOLE AND TRIMETHOPRIM 400; 80 MG/1; MG/1
1 TABLET ORAL DAILY
Status: DISCONTINUED | OUTPATIENT
Start: 2021-07-20 | End: 2021-07-23 | Stop reason: HOSPADM

## 2021-07-19 RX ADMIN — Medication 400 MG: at 09:07

## 2021-07-19 RX ADMIN — LIDOCAINE HYDROCHLORIDE 100 MG: 10 INJECTION, SOLUTION EPIDURAL; INFILTRATION; INTRACAUDAL; PERINEURAL at 11:07

## 2021-07-19 RX ADMIN — TACROLIMUS 2 MG: 1 CAPSULE ORAL at 06:07

## 2021-07-19 RX ADMIN — MYCOPHENOLATE MOFETIL 1500 MG: 200 POWDER, FOR SUSPENSION ORAL at 09:07

## 2021-07-20 ENCOUNTER — ANESTHESIA (OUTPATIENT)
Dept: ENDOSCOPY | Facility: HOSPITAL | Age: 21
DRG: 580 | End: 2021-07-20
Payer: MEDICAID

## 2021-07-20 ENCOUNTER — ANESTHESIA EVENT (OUTPATIENT)
Dept: ENDOSCOPY | Facility: HOSPITAL | Age: 21
DRG: 580 | End: 2021-07-20
Payer: MEDICAID

## 2021-07-20 LAB
ALBUMIN SERPL BCP-MCNC: 3.7 G/DL (ref 3.5–5.2)
ALP SERPL-CCNC: 94 U/L (ref 55–135)
ALT SERPL W/O P-5'-P-CCNC: 17 U/L (ref 10–44)
ANION GAP SERPL CALC-SCNC: 9 MMOL/L (ref 8–16)
ASCENDING AORTA: 2.43 CM
AST SERPL-CCNC: 14 U/L (ref 10–40)
AV INDEX (PROSTH): 0.84
AV MEAN GRADIENT: 1 MMHG
AV PEAK GRADIENT: 3 MMHG
AV VALVE AREA: 2.79 CM2
AV VELOCITY RATIO: 0.8
BASOPHILS # BLD AUTO: 0.03 K/UL (ref 0–0.2)
BASOPHILS NFR BLD: 0.4 % (ref 0–1.9)
BILIRUB SERPL-MCNC: 1 MG/DL (ref 0.1–1)
BSA FOR ECHO PROCEDURE: 1.84 M2
BUN SERPL-MCNC: 11 MG/DL (ref 6–20)
CALCIUM SERPL-MCNC: 9.2 MG/DL (ref 8.7–10.5)
CHLORIDE SERPL-SCNC: 107 MMOL/L (ref 95–110)
CO2 SERPL-SCNC: 24 MMOL/L (ref 23–29)
CREAT SERPL-MCNC: 0.8 MG/DL (ref 0.5–1.4)
CV ECHO LV RWT: 0.36 CM
DIFFERENTIAL METHOD: NORMAL
DOP CALC AO PEAK VEL: 0.86 M/S
DOP CALC AO VTI: 12.05 CM
DOP CALC LVOT AREA: 3.3 CM2
DOP CALC LVOT DIAMETER: 2.05 CM
DOP CALC LVOT PEAK VEL: 0.69 M/S
DOP CALC LVOT STROKE VOLUME: 33.58 CM3
DOP CALCLVOT PEAK VEL VTI: 10.18 CM
E/E' RATIO: 7.57 M/S
ECHO LV POSTERIOR WALL: 0.77 CM (ref 0.6–1.1)
EJECTION FRACTION: 60 %
EOSINOPHIL # BLD AUTO: 0.2 K/UL (ref 0–0.5)
EOSINOPHIL NFR BLD: 2.6 % (ref 0–8)
ERYTHROCYTE [DISTWIDTH] IN BLOOD BY AUTOMATED COUNT: 12.8 % (ref 11.5–14.5)
EST. GFR  (AFRICAN AMERICAN): >60 ML/MIN/1.73 M^2
EST. GFR  (NON AFRICAN AMERICAN): >60 ML/MIN/1.73 M^2
FRACTIONAL SHORTENING: 28 % (ref 28–44)
GLUCOSE SERPL-MCNC: 85 MG/DL (ref 70–110)
GRAM STN SPEC: NORMAL
GRAM STN SPEC: NORMAL
HCT VFR BLD AUTO: 44.3 % (ref 40–54)
HGB BLD-MCNC: 14.8 G/DL (ref 14–18)
IMM GRANULOCYTES # BLD AUTO: 0.04 K/UL (ref 0–0.04)
IMM GRANULOCYTES NFR BLD AUTO: 0.5 % (ref 0–0.5)
INTERVENTRICULAR SEPTUM: 0.84 CM (ref 0.6–1.1)
LEFT INTERNAL DIMENSION IN SYSTOLE: 3.05 CM (ref 2.1–4)
LEFT VENTRICLE DIASTOLIC VOLUME INDEX: 43.97 ML/M2
LEFT VENTRICLE DIASTOLIC VOLUME: 81.34 ML
LEFT VENTRICLE MASS INDEX: 57 G/M2
LEFT VENTRICLE SYSTOLIC VOLUME INDEX: 19.7 ML/M2
LEFT VENTRICLE SYSTOLIC VOLUME: 36.47 ML
LEFT VENTRICULAR INTERNAL DIMENSION IN DIASTOLE: 4.26 CM (ref 3.5–6)
LEFT VENTRICULAR MASS: 104.56 G
LV LATERAL E/E' RATIO: 7.57 M/S
LV SEPTAL E/E' RATIO: 7.57 M/S
LYMPHOCYTES # BLD AUTO: 2.6 K/UL (ref 1–4.8)
LYMPHOCYTES NFR BLD: 30.9 % (ref 18–48)
MAGNESIUM SERPL-MCNC: 1.3 MG/DL (ref 1.6–2.6)
MCH RBC QN AUTO: 27.6 PG (ref 27–31)
MCHC RBC AUTO-ENTMCNC: 33.4 G/DL (ref 32–36)
MCV RBC AUTO: 83 FL (ref 82–98)
MONOCYTES # BLD AUTO: 1 K/UL (ref 0.3–1)
MONOCYTES NFR BLD: 11.4 % (ref 4–15)
MV PEAK E VEL: 0.53 M/S
NEUTROPHILS # BLD AUTO: 4.6 K/UL (ref 1.8–7.7)
NEUTROPHILS NFR BLD: 54.2 % (ref 38–73)
NRBC BLD-RTO: 0 /100 WBC
PISA TR MAX VEL: 1.59 M/S
PLATELET # BLD AUTO: 234 K/UL (ref 150–450)
PMV BLD AUTO: 10.8 FL (ref 9.2–12.9)
POTASSIUM SERPL-SCNC: 3.7 MMOL/L (ref 3.5–5.1)
PROT SERPL-MCNC: 7 G/DL (ref 6–8.4)
PULM VEIN S/D RATIO: 0.74
PV PEAK D VEL: 0.31 M/S
PV PEAK S VEL: 0.23 M/S
RA PRESSURE: 3 MMHG
RBC # BLD AUTO: 5.37 M/UL (ref 4.6–6.2)
RIGHT VENTRICULAR END-DIASTOLIC DIMENSION: 4.15 CM
RV TISSUE DOPPLER FREE WALL SYSTOLIC VELOCITY 1 (APICAL 4 CHAMBER VIEW): 6.34 CM/S
SINUS: 2.98 CM
SODIUM SERPL-SCNC: 140 MMOL/L (ref 136–145)
STJ: 2.46 CM
TACROLIMUS BLD-MCNC: 18.7 NG/ML (ref 5–15)
TACROLIMUS, NORMALIZED: 16 NG/ML (ref 5–15)
TDI LATERAL: 0.07 M/S
TDI SEPTAL: 0.07 M/S
TDI: 0.07 M/S
TR MAX PG: 10 MMHG
TRICUSPID ANNULAR PLANE SYSTOLIC EXCURSION: 0.92 CM
TV REST PULMONARY ARTERY PRESSURE: 13 MMHG
WBC # BLD AUTO: 8.44 K/UL (ref 3.9–12.7)

## 2021-07-20 PROCEDURE — 99223 1ST HOSP IP/OBS HIGH 75: CPT | Mod: ,,, | Performed by: INTERNAL MEDICINE

## 2021-07-20 PROCEDURE — 43270 PR EGD, FLEX, W/ABLATION, TUMOR/POLYP/LESION(S), W/ DILATION: ICD-10-PCS | Mod: 52,,, | Performed by: INTERNAL MEDICINE

## 2021-07-20 PROCEDURE — 96374 THER/PROPH/DIAG INJ IV PUSH: CPT

## 2021-07-20 PROCEDURE — 43270 EGD LESION ABLATION: CPT | Performed by: INTERNAL MEDICINE

## 2021-07-20 PROCEDURE — 87102 FUNGUS ISOLATION CULTURE: CPT | Performed by: STUDENT IN AN ORGANIZED HEALTH CARE EDUCATION/TRAINING PROGRAM

## 2021-07-20 PROCEDURE — 63600175 PHARM REV CODE 636 W HCPCS: Performed by: STUDENT IN AN ORGANIZED HEALTH CARE EDUCATION/TRAINING PROGRAM

## 2021-07-20 PROCEDURE — 27202298: Performed by: INTERNAL MEDICINE

## 2021-07-20 PROCEDURE — 96375 TX/PRO/DX INJ NEW DRUG ADDON: CPT

## 2021-07-20 PROCEDURE — 25000003 PHARM REV CODE 250: Performed by: PHYSICIAN ASSISTANT

## 2021-07-20 PROCEDURE — 36415 COLL VENOUS BLD VENIPUNCTURE: CPT | Performed by: NURSE PRACTITIONER

## 2021-07-20 PROCEDURE — 99233 PR SUBSEQUENT HOSPITAL CARE,LEVL III: ICD-10-PCS | Mod: ,,, | Performed by: NURSE PRACTITIONER

## 2021-07-20 PROCEDURE — 87205 SMEAR GRAM STAIN: CPT | Performed by: STUDENT IN AN ORGANIZED HEALTH CARE EDUCATION/TRAINING PROGRAM

## 2021-07-20 PROCEDURE — 87070 CULTURE OTHR SPECIMN AEROBIC: CPT | Performed by: STUDENT IN AN ORGANIZED HEALTH CARE EDUCATION/TRAINING PROGRAM

## 2021-07-20 PROCEDURE — D9220A PRA ANESTHESIA: ICD-10-PCS | Mod: ANES,,, | Performed by: ANESTHESIOLOGY

## 2021-07-20 PROCEDURE — D9220A PRA ANESTHESIA: Mod: CRNA,,, | Performed by: STUDENT IN AN ORGANIZED HEALTH CARE EDUCATION/TRAINING PROGRAM

## 2021-07-20 PROCEDURE — D9220A PRA ANESTHESIA: ICD-10-PCS | Mod: CRNA,,, | Performed by: STUDENT IN AN ORGANIZED HEALTH CARE EDUCATION/TRAINING PROGRAM

## 2021-07-20 PROCEDURE — 85025 COMPLETE CBC W/AUTO DIFF WBC: CPT | Performed by: NURSE PRACTITIONER

## 2021-07-20 PROCEDURE — 27202303 HC GRASPER, SPECIALTY: Performed by: INTERNAL MEDICINE

## 2021-07-20 PROCEDURE — 20600001 HC STEP DOWN PRIVATE ROOM

## 2021-07-20 PROCEDURE — 94761 N-INVAS EAR/PLS OXIMETRY MLT: CPT

## 2021-07-20 PROCEDURE — D9220A PRA ANESTHESIA: Mod: ANES,,, | Performed by: ANESTHESIOLOGY

## 2021-07-20 PROCEDURE — 25000003 PHARM REV CODE 250: Performed by: INTERNAL MEDICINE

## 2021-07-20 PROCEDURE — 80053 COMPREHEN METABOLIC PANEL: CPT | Performed by: NURSE PRACTITIONER

## 2021-07-20 PROCEDURE — 37000008 HC ANESTHESIA 1ST 15 MINUTES: Performed by: INTERNAL MEDICINE

## 2021-07-20 PROCEDURE — 87040 BLOOD CULTURE FOR BACTERIA: CPT | Mod: 59 | Performed by: PHYSICIAN ASSISTANT

## 2021-07-20 PROCEDURE — 99223 PR INITIAL HOSPITAL CARE,LEVL III: ICD-10-PCS | Mod: ,,, | Performed by: INTERNAL MEDICINE

## 2021-07-20 PROCEDURE — 63600175 PHARM REV CODE 636 W HCPCS: Performed by: INTERNAL MEDICINE

## 2021-07-20 PROCEDURE — 27202087 HC PROBE, APC: Performed by: INTERNAL MEDICINE

## 2021-07-20 PROCEDURE — 43270 EGD LESION ABLATION: CPT | Mod: 52,,, | Performed by: INTERNAL MEDICINE

## 2021-07-20 PROCEDURE — 96376 TX/PRO/DX INJ SAME DRUG ADON: CPT

## 2021-07-20 PROCEDURE — 99233 SBSQ HOSP IP/OBS HIGH 50: CPT | Mod: ,,, | Performed by: NURSE PRACTITIONER

## 2021-07-20 PROCEDURE — 83735 ASSAY OF MAGNESIUM: CPT | Performed by: NURSE PRACTITIONER

## 2021-07-20 PROCEDURE — 37000009 HC ANESTHESIA EA ADD 15 MINS: Performed by: INTERNAL MEDICINE

## 2021-07-20 PROCEDURE — 80197 ASSAY OF TACROLIMUS: CPT | Performed by: NURSE PRACTITIONER

## 2021-07-20 PROCEDURE — 87075 CULTR BACTERIA EXCEPT BLOOD: CPT | Performed by: STUDENT IN AN ORGANIZED HEALTH CARE EDUCATION/TRAINING PROGRAM

## 2021-07-20 PROCEDURE — 27000221 HC OXYGEN, UP TO 24 HOURS

## 2021-07-20 PROCEDURE — 25000003 PHARM REV CODE 250: Performed by: NURSE PRACTITIONER

## 2021-07-20 PROCEDURE — 25000003 PHARM REV CODE 250: Performed by: STUDENT IN AN ORGANIZED HEALTH CARE EDUCATION/TRAINING PROGRAM

## 2021-07-20 PROCEDURE — 63600175 PHARM REV CODE 636 W HCPCS: Performed by: NURSE PRACTITIONER

## 2021-07-20 PROCEDURE — 63600175 PHARM REV CODE 636 W HCPCS: Performed by: PHYSICIAN ASSISTANT

## 2021-07-20 RX ORDER — SULFAMETHOXAZOLE AND TRIMETHOPRIM 400; 80 MG/1; MG/1
1 TABLET ORAL DAILY
Qty: 90 TABLET | Refills: 1 | Status: SHIPPED | OUTPATIENT
Start: 2021-07-20 | End: 2022-01-20

## 2021-07-20 RX ORDER — TACROLIMUS 1 MG/1
2 CAPSULE ORAL EVERY MORNING
Status: DISCONTINUED | OUTPATIENT
Start: 2021-07-21 | End: 2021-07-23 | Stop reason: HOSPADM

## 2021-07-20 RX ORDER — ONDANSETRON 2 MG/ML
INJECTION INTRAMUSCULAR; INTRAVENOUS
Status: DISCONTINUED | OUTPATIENT
Start: 2021-07-20 | End: 2021-07-20

## 2021-07-20 RX ORDER — SUCCINYLCHOLINE CHLORIDE 20 MG/ML
INJECTION INTRAMUSCULAR; INTRAVENOUS
Status: DISCONTINUED | OUTPATIENT
Start: 2021-07-20 | End: 2021-07-20

## 2021-07-20 RX ORDER — MIDAZOLAM HYDROCHLORIDE 1 MG/ML
INJECTION INTRAMUSCULAR; INTRAVENOUS
Status: DISCONTINUED | OUTPATIENT
Start: 2021-07-20 | End: 2021-07-20

## 2021-07-20 RX ORDER — MYCOPHENOLATE MOFETIL 200 MG/ML
1500 POWDER, FOR SUSPENSION ORAL 2 TIMES DAILY
Qty: 480 ML | Refills: 11 | Status: SHIPPED | OUTPATIENT
Start: 2021-07-20 | End: 2022-08-07

## 2021-07-20 RX ORDER — DIPHENHYDRAMINE HCL 12.5MG/5ML
25 ELIXIR ORAL EVERY 6 HOURS PRN
Status: DISCONTINUED | OUTPATIENT
Start: 2021-07-20 | End: 2021-07-23 | Stop reason: HOSPADM

## 2021-07-20 RX ORDER — VANCOMYCIN HCL IN 5 % DEXTROSE 1G/250ML
1000 PLASTIC BAG, INJECTION (ML) INTRAVENOUS
Status: DISCONTINUED | OUTPATIENT
Start: 2021-07-21 | End: 2021-07-21

## 2021-07-20 RX ORDER — ZOLPIDEM TARTRATE 5 MG/1
5 TABLET ORAL NIGHTLY PRN
Status: DISCONTINUED | OUTPATIENT
Start: 2021-07-20 | End: 2021-07-20

## 2021-07-20 RX ORDER — PRAVASTATIN SODIUM 40 MG/1
40 TABLET ORAL DAILY
Qty: 90 TABLET | Refills: 3 | Status: SHIPPED | OUTPATIENT
Start: 2021-07-20 | End: 2022-07-24

## 2021-07-20 RX ORDER — PREDNISONE 5 MG/1
10 TABLET ORAL DAILY
Qty: 60 TABLET | Refills: 2 | Status: SHIPPED | OUTPATIENT
Start: 2021-07-20 | End: 2021-10-28

## 2021-07-20 RX ORDER — LANOLIN ALCOHOL/MO/W.PET/CERES
400 CREAM (GRAM) TOPICAL 2 TIMES DAILY
Qty: 60 TABLET | Refills: 5 | OUTPATIENT
Start: 2021-07-20 | End: 2021-07-23

## 2021-07-20 RX ORDER — TACROLIMUS 1 MG/1
2 CAPSULE ORAL EVERY 12 HOURS
Qty: 120 CAPSULE | Refills: 11 | Status: SHIPPED | OUTPATIENT
Start: 2021-07-20 | End: 2021-07-23 | Stop reason: SDUPTHER

## 2021-07-20 RX ORDER — VIT C/E/ZN/COPPR/LUTEIN/ZEAXAN 250MG-90MG
1000 CAPSULE ORAL DAILY
Qty: 30 CAPSULE | Refills: 11 | Status: SHIPPED | OUTPATIENT
Start: 2021-07-20 | End: 2023-09-11 | Stop reason: SDUPTHER

## 2021-07-20 RX ORDER — PROPOFOL 10 MG/ML
VIAL (ML) INTRAVENOUS
Status: DISCONTINUED | OUTPATIENT
Start: 2021-07-20 | End: 2021-07-20

## 2021-07-20 RX ORDER — CEFEPIME HYDROCHLORIDE 2 G/1
2 INJECTION, POWDER, FOR SOLUTION INTRAVENOUS
Status: COMPLETED | OUTPATIENT
Start: 2021-07-20 | End: 2021-07-22

## 2021-07-20 RX ORDER — DIPHENHYDRAMINE HCL 25 MG
25 CAPSULE ORAL ONCE
Status: COMPLETED | OUTPATIENT
Start: 2021-07-20 | End: 2021-07-20

## 2021-07-20 RX ORDER — FENTANYL CITRATE 50 UG/ML
INJECTION, SOLUTION INTRAMUSCULAR; INTRAVENOUS
Status: DISCONTINUED | OUTPATIENT
Start: 2021-07-20 | End: 2021-07-20

## 2021-07-20 RX ORDER — TACROLIMUS 1 MG/1
1 CAPSULE ORAL EVERY EVENING
Status: DISCONTINUED | OUTPATIENT
Start: 2021-07-20 | End: 2021-07-23 | Stop reason: HOSPADM

## 2021-07-20 RX ORDER — LANOLIN ALCOHOL/MO/W.PET/CERES
800 CREAM (GRAM) TOPICAL ONCE
Status: COMPLETED | OUTPATIENT
Start: 2021-07-20 | End: 2021-07-20

## 2021-07-20 RX ORDER — LIDOCAINE HYDROCHLORIDE 20 MG/ML
INJECTION, SOLUTION EPIDURAL; INFILTRATION; INTRACAUDAL; PERINEURAL
Status: DISCONTINUED | OUTPATIENT
Start: 2021-07-20 | End: 2021-07-20

## 2021-07-20 RX ORDER — HYDROCODONE BITARTRATE AND ACETAMINOPHEN 7.5; 325 MG/15ML; MG/15ML
10 SOLUTION ORAL EVERY 4 HOURS PRN
Status: DISCONTINUED | OUTPATIENT
Start: 2021-07-20 | End: 2021-07-23 | Stop reason: HOSPADM

## 2021-07-20 RX ORDER — SODIUM CHLORIDE 9 MG/ML
INJECTION, SOLUTION INTRAVENOUS CONTINUOUS PRN
Status: DISCONTINUED | OUTPATIENT
Start: 2021-07-20 | End: 2021-07-20

## 2021-07-20 RX ADMIN — Medication 400 MG: at 08:07

## 2021-07-20 RX ADMIN — SULFAMETHOXAZOLE AND TRIMETHOPRIM 1 TABLET: 400; 80 TABLET ORAL at 08:07

## 2021-07-20 RX ADMIN — LIDOCAINE HYDROCHLORIDE 80 MG: 20 INJECTION, SOLUTION EPIDURAL; INFILTRATION; INTRACAUDAL at 12:07

## 2021-07-20 RX ADMIN — TACROLIMUS 1 MG: 1 CAPSULE ORAL at 06:07

## 2021-07-20 RX ADMIN — DIPHENHYDRAMINE HYDROCHLORIDE 25 MG: 25 SOLUTION ORAL at 03:07

## 2021-07-20 RX ADMIN — VANCOMYCIN HYDROCHLORIDE 1000 MG: 1 INJECTION, POWDER, LYOPHILIZED, FOR SOLUTION INTRAVENOUS at 04:07

## 2021-07-20 RX ADMIN — TACROLIMUS 2 MG: 1 CAPSULE ORAL at 08:07

## 2021-07-20 RX ADMIN — CEFEPIME 2 G: 2 INJECTION, POWDER, FOR SOLUTION INTRAVENOUS at 03:07

## 2021-07-20 RX ADMIN — MYCOPHENOLATE MOFETIL 1500 MG: 200 POWDER, FOR SUSPENSION ORAL at 08:07

## 2021-07-20 RX ADMIN — PROPOFOL 50 MG: 10 INJECTION, EMULSION INTRAVENOUS at 12:07

## 2021-07-20 RX ADMIN — PROPOFOL 200 MG: 10 INJECTION, EMULSION INTRAVENOUS at 12:07

## 2021-07-20 RX ADMIN — ASPIRIN 81 MG CHEWABLE TABLET 81 MG: 81 TABLET CHEWABLE at 08:07

## 2021-07-20 RX ADMIN — SUCCINYLCHOLINE CHLORIDE 120 MG: 20 INJECTION, SOLUTION INTRAMUSCULAR; INTRAVENOUS; PARENTERAL at 12:07

## 2021-07-20 RX ADMIN — CEFEPIME 1 G: 1 INJECTION, POWDER, FOR SOLUTION INTRAMUSCULAR; INTRAVENOUS at 12:07

## 2021-07-20 RX ADMIN — FENTANYL CITRATE 100 MCG: 50 INJECTION INTRAMUSCULAR; INTRAVENOUS at 12:07

## 2021-07-20 RX ADMIN — VANCOMYCIN HYDROCHLORIDE 1750 MG: 10 INJECTION, POWDER, LYOPHILIZED, FOR SOLUTION INTRAVENOUS at 01:07

## 2021-07-20 RX ADMIN — MIDAZOLAM 2 MG: 1 INJECTION INTRAMUSCULAR; INTRAVENOUS at 12:07

## 2021-07-20 RX ADMIN — CHOLECALCIFEROL (VITAMIN D3) 25 MCG (1,000 UNIT) TABLET 1000 UNITS: TABLET at 08:07

## 2021-07-20 RX ADMIN — PRAVASTATIN SODIUM 40 MG: 40 TABLET ORAL at 08:07

## 2021-07-20 RX ADMIN — SODIUM CHLORIDE: 9 INJECTION, SOLUTION INTRAVENOUS at 12:07

## 2021-07-20 RX ADMIN — PREDNISONE 10 MG: 10 TABLET ORAL at 08:07

## 2021-07-20 RX ADMIN — ONDANSETRON 4 MG: 2 INJECTION INTRAMUSCULAR; INTRAVENOUS at 12:07

## 2021-07-20 RX ADMIN — Medication 800 MG: at 03:07

## 2021-07-20 RX ADMIN — DIPHENHYDRAMINE HYDROCHLORIDE 25 MG: 25 CAPSULE ORAL at 02:07

## 2021-07-20 RX ADMIN — CEFEPIME 1 G: 1 INJECTION, POWDER, FOR SOLUTION INTRAMUSCULAR; INTRAVENOUS at 08:07

## 2021-07-21 ENCOUNTER — TELEPHONE (OUTPATIENT)
Dept: TRANSPLANT | Facility: CLINIC | Age: 21
End: 2021-07-21

## 2021-07-21 LAB
ALBUMIN SERPL BCP-MCNC: 4 G/DL (ref 3.5–5.2)
ALP SERPL-CCNC: 109 U/L (ref 55–135)
ALT SERPL W/O P-5'-P-CCNC: 22 U/L (ref 10–44)
ANION GAP SERPL CALC-SCNC: 11 MMOL/L (ref 8–16)
AST SERPL-CCNC: 25 U/L (ref 10–40)
BASOPHILS # BLD AUTO: 0.02 K/UL (ref 0–0.2)
BASOPHILS NFR BLD: 0.2 % (ref 0–1.9)
BILIRUB SERPL-MCNC: 0.8 MG/DL (ref 0.1–1)
BUN SERPL-MCNC: 8 MG/DL (ref 6–20)
CALCIUM SERPL-MCNC: 9.7 MG/DL (ref 8.7–10.5)
CHLORIDE SERPL-SCNC: 107 MMOL/L (ref 95–110)
CO2 SERPL-SCNC: 18 MMOL/L (ref 23–29)
CREAT SERPL-MCNC: 0.9 MG/DL (ref 0.5–1.4)
DIFFERENTIAL METHOD: ABNORMAL
EOSINOPHIL # BLD AUTO: 0.2 K/UL (ref 0–0.5)
EOSINOPHIL NFR BLD: 2.1 % (ref 0–8)
ERYTHROCYTE [DISTWIDTH] IN BLOOD BY AUTOMATED COUNT: 12.9 % (ref 11.5–14.5)
EST. GFR  (AFRICAN AMERICAN): >60 ML/MIN/1.73 M^2
EST. GFR  (NON AFRICAN AMERICAN): >60 ML/MIN/1.73 M^2
GLUCOSE SERPL-MCNC: 80 MG/DL (ref 70–110)
HCT VFR BLD AUTO: 50.1 % (ref 40–54)
HGB BLD-MCNC: 16.9 G/DL (ref 14–18)
IMM GRANULOCYTES # BLD AUTO: 0.04 K/UL (ref 0–0.04)
IMM GRANULOCYTES NFR BLD AUTO: 0.4 % (ref 0–0.5)
LYMPHOCYTES # BLD AUTO: 3.5 K/UL (ref 1–4.8)
LYMPHOCYTES NFR BLD: 35.8 % (ref 18–48)
MAGNESIUM SERPL-MCNC: 1.5 MG/DL (ref 1.6–2.6)
MCH RBC QN AUTO: 28.1 PG (ref 27–31)
MCHC RBC AUTO-ENTMCNC: 33.7 G/DL (ref 32–36)
MCV RBC AUTO: 83 FL (ref 82–98)
MONOCYTES # BLD AUTO: 1.1 K/UL (ref 0.3–1)
MONOCYTES NFR BLD: 11.3 % (ref 4–15)
NEUTROPHILS # BLD AUTO: 4.9 K/UL (ref 1.8–7.7)
NEUTROPHILS NFR BLD: 50.2 % (ref 38–73)
NRBC BLD-RTO: 0 /100 WBC
PLATELET # BLD AUTO: 217 K/UL (ref 150–450)
PMV BLD AUTO: 10.5 FL (ref 9.2–12.9)
POTASSIUM SERPL-SCNC: 4.1 MMOL/L (ref 3.5–5.1)
PROT SERPL-MCNC: 7.9 G/DL (ref 6–8.4)
RBC # BLD AUTO: 6.01 M/UL (ref 4.6–6.2)
SODIUM SERPL-SCNC: 136 MMOL/L (ref 136–145)
TACROLIMUS BLD-MCNC: 17.4 NG/ML (ref 5–15)
TACROLIMUS, NORMALIZED: 15 NG/ML (ref 5–15)
WBC # BLD AUTO: 9.77 K/UL (ref 3.9–12.7)

## 2021-07-21 PROCEDURE — 80197 ASSAY OF TACROLIMUS: CPT | Performed by: NURSE PRACTITIONER

## 2021-07-21 PROCEDURE — 83735 ASSAY OF MAGNESIUM: CPT | Performed by: NURSE PRACTITIONER

## 2021-07-21 PROCEDURE — 99233 SBSQ HOSP IP/OBS HIGH 50: CPT | Mod: ,,, | Performed by: INTERNAL MEDICINE

## 2021-07-21 PROCEDURE — 63600175 PHARM REV CODE 636 W HCPCS: Performed by: NURSE PRACTITIONER

## 2021-07-21 PROCEDURE — 93010 ELECTROCARDIOGRAM REPORT: CPT | Mod: ,,, | Performed by: INTERNAL MEDICINE

## 2021-07-21 PROCEDURE — 99233 PR SUBSEQUENT HOSPITAL CARE,LEVL III: ICD-10-PCS | Mod: ,,, | Performed by: INTERNAL MEDICINE

## 2021-07-21 PROCEDURE — 20600001 HC STEP DOWN PRIVATE ROOM

## 2021-07-21 PROCEDURE — 25000003 PHARM REV CODE 250: Performed by: NURSE PRACTITIONER

## 2021-07-21 PROCEDURE — 85025 COMPLETE CBC W/AUTO DIFF WBC: CPT | Performed by: NURSE PRACTITIONER

## 2021-07-21 PROCEDURE — 63600175 PHARM REV CODE 636 W HCPCS: Performed by: INTERNAL MEDICINE

## 2021-07-21 PROCEDURE — 80053 COMPREHEN METABOLIC PANEL: CPT | Performed by: NURSE PRACTITIONER

## 2021-07-21 PROCEDURE — 93010 EKG 12-LEAD: ICD-10-PCS | Mod: ,,, | Performed by: INTERNAL MEDICINE

## 2021-07-21 PROCEDURE — 25000003 PHARM REV CODE 250: Performed by: INTERNAL MEDICINE

## 2021-07-21 PROCEDURE — 93005 ELECTROCARDIOGRAM TRACING: CPT

## 2021-07-21 PROCEDURE — 36415 COLL VENOUS BLD VENIPUNCTURE: CPT | Performed by: NURSE PRACTITIONER

## 2021-07-21 RX ADMIN — TACROLIMUS 1 MG: 1 CAPSULE ORAL at 05:07

## 2021-07-21 RX ADMIN — CEFEPIME 2 G: 2 INJECTION, POWDER, FOR SOLUTION INTRAVENOUS at 12:07

## 2021-07-21 RX ADMIN — TACROLIMUS 2 MG: 1 CAPSULE ORAL at 08:07

## 2021-07-21 RX ADMIN — CHOLECALCIFEROL (VITAMIN D3) 25 MCG (1,000 UNIT) TABLET 1000 UNITS: TABLET at 08:07

## 2021-07-21 RX ADMIN — MYCOPHENOLATE MOFETIL 1500 MG: 200 POWDER, FOR SUSPENSION ORAL at 08:07

## 2021-07-21 RX ADMIN — CEFEPIME 2 G: 2 INJECTION, POWDER, FOR SOLUTION INTRAVENOUS at 11:07

## 2021-07-21 RX ADMIN — SODIUM CHLORIDE 500 ML: 0.9 INJECTION, SOLUTION INTRAVENOUS at 01:07

## 2021-07-21 RX ADMIN — Medication 400 MG: at 08:07

## 2021-07-21 RX ADMIN — CEFEPIME 2 G: 2 INJECTION, POWDER, FOR SOLUTION INTRAVENOUS at 08:07

## 2021-07-21 RX ADMIN — CEFEPIME 2 G: 2 INJECTION, POWDER, FOR SOLUTION INTRAVENOUS at 04:07

## 2021-07-21 RX ADMIN — PRAVASTATIN SODIUM 40 MG: 40 TABLET ORAL at 08:07

## 2021-07-21 RX ADMIN — PREDNISONE 10 MG: 10 TABLET ORAL at 08:07

## 2021-07-21 RX ADMIN — VANCOMYCIN HYDROCHLORIDE 1000 MG: 1 INJECTION, POWDER, LYOPHILIZED, FOR SOLUTION INTRAVENOUS at 04:07

## 2021-07-21 RX ADMIN — ASPIRIN 81 MG CHEWABLE TABLET 81 MG: 81 TABLET CHEWABLE at 08:07

## 2021-07-21 RX ADMIN — SULFAMETHOXAZOLE AND TRIMETHOPRIM 1 TABLET: 400; 80 TABLET ORAL at 08:07

## 2021-07-22 LAB
ALBUMIN SERPL BCP-MCNC: 3.7 G/DL (ref 3.5–5.2)
ALP SERPL-CCNC: 100 U/L (ref 55–135)
ALT SERPL W/O P-5'-P-CCNC: 19 U/L (ref 10–44)
ANION GAP SERPL CALC-SCNC: 8 MMOL/L (ref 8–16)
AST SERPL-CCNC: 15 U/L (ref 10–40)
BACTERIA SPEC AEROBE CULT: NORMAL
BASOPHILS # BLD AUTO: 0.03 K/UL (ref 0–0.2)
BASOPHILS NFR BLD: 0.3 % (ref 0–1.9)
BILIRUB SERPL-MCNC: 0.9 MG/DL (ref 0.1–1)
BUN SERPL-MCNC: 7 MG/DL (ref 6–20)
CALCIUM SERPL-MCNC: 9.7 MG/DL (ref 8.7–10.5)
CHLORIDE SERPL-SCNC: 107 MMOL/L (ref 95–110)
CO2 SERPL-SCNC: 27 MMOL/L (ref 23–29)
CREAT SERPL-MCNC: 1 MG/DL (ref 0.5–1.4)
DIFFERENTIAL METHOD: ABNORMAL
EOSINOPHIL # BLD AUTO: 0.2 K/UL (ref 0–0.5)
EOSINOPHIL NFR BLD: 2.4 % (ref 0–8)
ERYTHROCYTE [DISTWIDTH] IN BLOOD BY AUTOMATED COUNT: 12.8 % (ref 11.5–14.5)
EST. GFR  (AFRICAN AMERICAN): >60 ML/MIN/1.73 M^2
EST. GFR  (NON AFRICAN AMERICAN): >60 ML/MIN/1.73 M^2
GLUCOSE SERPL-MCNC: 87 MG/DL (ref 70–110)
HCT VFR BLD AUTO: 47.3 % (ref 40–54)
HGB BLD-MCNC: 15.5 G/DL (ref 14–18)
IMM GRANULOCYTES # BLD AUTO: 0.05 K/UL (ref 0–0.04)
IMM GRANULOCYTES NFR BLD AUTO: 0.6 % (ref 0–0.5)
LYMPHOCYTES # BLD AUTO: 2.7 K/UL (ref 1–4.8)
LYMPHOCYTES NFR BLD: 31.3 % (ref 18–48)
MAGNESIUM SERPL-MCNC: 1.6 MG/DL (ref 1.6–2.6)
MCH RBC QN AUTO: 27.1 PG (ref 27–31)
MCHC RBC AUTO-ENTMCNC: 32.8 G/DL (ref 32–36)
MCV RBC AUTO: 83 FL (ref 82–98)
MONOCYTES # BLD AUTO: 0.9 K/UL (ref 0.3–1)
MONOCYTES NFR BLD: 10.7 % (ref 4–15)
NEUTROPHILS # BLD AUTO: 4.7 K/UL (ref 1.8–7.7)
NEUTROPHILS NFR BLD: 54.7 % (ref 38–73)
NRBC BLD-RTO: 0 /100 WBC
PLATELET # BLD AUTO: 224 K/UL (ref 150–450)
PMV BLD AUTO: 10.7 FL (ref 9.2–12.9)
POTASSIUM SERPL-SCNC: 4.7 MMOL/L (ref 3.5–5.1)
PROT SERPL-MCNC: 7.2 G/DL (ref 6–8.4)
RBC # BLD AUTO: 5.73 M/UL (ref 4.6–6.2)
SODIUM SERPL-SCNC: 142 MMOL/L (ref 136–145)
TACROLIMUS BLD-MCNC: 13 NG/ML (ref 5–15)
TACROLIMUS, NORMALIZED: 11.3 NG/ML (ref 5–15)
WBC # BLD AUTO: 8.59 K/UL (ref 3.9–12.7)

## 2021-07-22 PROCEDURE — 80197 ASSAY OF TACROLIMUS: CPT | Performed by: NURSE PRACTITIONER

## 2021-07-22 PROCEDURE — 63600175 PHARM REV CODE 636 W HCPCS: Performed by: NURSE PRACTITIONER

## 2021-07-22 PROCEDURE — 25000003 PHARM REV CODE 250: Performed by: NURSE PRACTITIONER

## 2021-07-22 PROCEDURE — 99233 PR SUBSEQUENT HOSPITAL CARE,LEVL III: ICD-10-PCS | Mod: ,,, | Performed by: INTERNAL MEDICINE

## 2021-07-22 PROCEDURE — 85025 COMPLETE CBC W/AUTO DIFF WBC: CPT | Performed by: NURSE PRACTITIONER

## 2021-07-22 PROCEDURE — 80053 COMPREHEN METABOLIC PANEL: CPT | Performed by: NURSE PRACTITIONER

## 2021-07-22 PROCEDURE — 20600001 HC STEP DOWN PRIVATE ROOM

## 2021-07-22 PROCEDURE — 99233 SBSQ HOSP IP/OBS HIGH 50: CPT | Mod: ,,, | Performed by: INTERNAL MEDICINE

## 2021-07-22 PROCEDURE — 36415 COLL VENOUS BLD VENIPUNCTURE: CPT | Performed by: NURSE PRACTITIONER

## 2021-07-22 PROCEDURE — 99233 SBSQ HOSP IP/OBS HIGH 50: CPT | Mod: ,,, | Performed by: NURSE PRACTITIONER

## 2021-07-22 PROCEDURE — 99233 PR SUBSEQUENT HOSPITAL CARE,LEVL III: ICD-10-PCS | Mod: ,,, | Performed by: NURSE PRACTITIONER

## 2021-07-22 PROCEDURE — 83735 ASSAY OF MAGNESIUM: CPT | Performed by: NURSE PRACTITIONER

## 2021-07-22 PROCEDURE — 63600175 PHARM REV CODE 636 W HCPCS: Performed by: INTERNAL MEDICINE

## 2021-07-22 RX ORDER — AMOXICILLIN AND CLAVULANATE POTASSIUM 875; 125 MG/1; MG/1
1 TABLET, FILM COATED ORAL EVERY 12 HOURS
Status: DISCONTINUED | OUTPATIENT
Start: 2021-07-23 | End: 2021-07-23 | Stop reason: HOSPADM

## 2021-07-22 RX ADMIN — SODIUM CHLORIDE 500 ML: 0.9 INJECTION, SOLUTION INTRAVENOUS at 12:07

## 2021-07-22 RX ADMIN — SULFAMETHOXAZOLE AND TRIMETHOPRIM 1 TABLET: 400; 80 TABLET ORAL at 08:07

## 2021-07-22 RX ADMIN — CEFEPIME 2 G: 2 INJECTION, POWDER, FOR SOLUTION INTRAVENOUS at 05:07

## 2021-07-22 RX ADMIN — CEFEPIME 2 G: 2 INJECTION, POWDER, FOR SOLUTION INTRAVENOUS at 08:07

## 2021-07-22 RX ADMIN — TACROLIMUS 1 MG: 1 CAPSULE ORAL at 05:07

## 2021-07-22 RX ADMIN — PRAVASTATIN SODIUM 40 MG: 40 TABLET ORAL at 08:07

## 2021-07-22 RX ADMIN — MYCOPHENOLATE MOFETIL 1500 MG: 200 POWDER, FOR SUSPENSION ORAL at 09:07

## 2021-07-22 RX ADMIN — ASPIRIN 81 MG CHEWABLE TABLET 81 MG: 81 TABLET CHEWABLE at 08:07

## 2021-07-22 RX ADMIN — Medication 400 MG: at 09:07

## 2021-07-22 RX ADMIN — Medication 400 MG: at 08:07

## 2021-07-22 RX ADMIN — CHOLECALCIFEROL (VITAMIN D3) 25 MCG (1,000 UNIT) TABLET 1000 UNITS: TABLET at 08:07

## 2021-07-22 RX ADMIN — TACROLIMUS 2 MG: 1 CAPSULE ORAL at 08:07

## 2021-07-22 RX ADMIN — PREDNISONE 10 MG: 10 TABLET ORAL at 08:07

## 2021-07-22 RX ADMIN — MYCOPHENOLATE MOFETIL 1500 MG: 200 POWDER, FOR SUSPENSION ORAL at 08:07

## 2021-07-23 ENCOUNTER — TELEPHONE (OUTPATIENT)
Dept: TRANSPLANT | Facility: CLINIC | Age: 21
End: 2021-07-23

## 2021-07-23 VITALS
BODY MASS INDEX: 23.12 KG/M2 | OXYGEN SATURATION: 96 % | SYSTOLIC BLOOD PRESSURE: 100 MMHG | DIASTOLIC BLOOD PRESSURE: 75 MMHG | WEIGHT: 156.06 LBS | HEART RATE: 120 BPM | RESPIRATION RATE: 16 BRPM | HEIGHT: 69 IN | TEMPERATURE: 98 F

## 2021-07-23 LAB
ALBUMIN SERPL BCP-MCNC: 3.8 G/DL (ref 3.5–5.2)
ALP SERPL-CCNC: 99 U/L (ref 55–135)
ALT SERPL W/O P-5'-P-CCNC: 22 U/L (ref 10–44)
ANION GAP SERPL CALC-SCNC: 10 MMOL/L (ref 8–16)
AST SERPL-CCNC: 19 U/L (ref 10–40)
BASOPHILS # BLD AUTO: 0.04 K/UL (ref 0–0.2)
BASOPHILS NFR BLD: 0.5 % (ref 0–1.9)
BILIRUB SERPL-MCNC: 0.6 MG/DL (ref 0.1–1)
BUN SERPL-MCNC: 8 MG/DL (ref 6–20)
CALCIUM SERPL-MCNC: 9.4 MG/DL (ref 8.7–10.5)
CHLORIDE SERPL-SCNC: 106 MMOL/L (ref 95–110)
CO2 SERPL-SCNC: 26 MMOL/L (ref 23–29)
CREAT SERPL-MCNC: 0.8 MG/DL (ref 0.5–1.4)
DIFFERENTIAL METHOD: NORMAL
EOSINOPHIL # BLD AUTO: 0.2 K/UL (ref 0–0.5)
EOSINOPHIL NFR BLD: 2.6 % (ref 0–8)
ERYTHROCYTE [DISTWIDTH] IN BLOOD BY AUTOMATED COUNT: 12.9 % (ref 11.5–14.5)
EST. GFR  (AFRICAN AMERICAN): >60 ML/MIN/1.73 M^2
EST. GFR  (NON AFRICAN AMERICAN): >60 ML/MIN/1.73 M^2
GLUCOSE SERPL-MCNC: 85 MG/DL (ref 70–110)
HCT VFR BLD AUTO: 47.4 % (ref 40–54)
HGB BLD-MCNC: 15.8 G/DL (ref 14–18)
IMM GRANULOCYTES # BLD AUTO: 0.04 K/UL (ref 0–0.04)
IMM GRANULOCYTES NFR BLD AUTO: 0.5 % (ref 0–0.5)
LYMPHOCYTES # BLD AUTO: 2.3 K/UL (ref 1–4.8)
LYMPHOCYTES NFR BLD: 29.1 % (ref 18–48)
MAGNESIUM SERPL-MCNC: 1.5 MG/DL (ref 1.6–2.6)
MCH RBC QN AUTO: 27.9 PG (ref 27–31)
MCHC RBC AUTO-ENTMCNC: 33.3 G/DL (ref 32–36)
MCV RBC AUTO: 84 FL (ref 82–98)
MONOCYTES # BLD AUTO: 0.8 K/UL (ref 0.3–1)
MONOCYTES NFR BLD: 9.5 % (ref 4–15)
NEUTROPHILS # BLD AUTO: 4.6 K/UL (ref 1.8–7.7)
NEUTROPHILS NFR BLD: 57.8 % (ref 38–73)
NRBC BLD-RTO: 0 /100 WBC
PLATELET # BLD AUTO: 197 K/UL (ref 150–450)
PMV BLD AUTO: 10.3 FL (ref 9.2–12.9)
POTASSIUM SERPL-SCNC: 4 MMOL/L (ref 3.5–5.1)
PROT SERPL-MCNC: 7.3 G/DL (ref 6–8.4)
RBC # BLD AUTO: 5.66 M/UL (ref 4.6–6.2)
SODIUM SERPL-SCNC: 142 MMOL/L (ref 136–145)
TACROLIMUS BLD-MCNC: 11.7 NG/ML (ref 5–15)
TACROLIMUS, NORMALIZED: 10.2 NG/ML (ref 5–15)
WBC # BLD AUTO: 7.93 K/UL (ref 3.9–12.7)

## 2021-07-23 PROCEDURE — 85025 COMPLETE CBC W/AUTO DIFF WBC: CPT | Performed by: NURSE PRACTITIONER

## 2021-07-23 PROCEDURE — 36415 COLL VENOUS BLD VENIPUNCTURE: CPT | Performed by: NURSE PRACTITIONER

## 2021-07-23 PROCEDURE — 99233 PR SUBSEQUENT HOSPITAL CARE,LEVL III: ICD-10-PCS | Mod: ,,, | Performed by: NURSE PRACTITIONER

## 2021-07-23 PROCEDURE — 83735 ASSAY OF MAGNESIUM: CPT | Performed by: NURSE PRACTITIONER

## 2021-07-23 PROCEDURE — 91300 PHARM REV CODE 636 W HCPCS: CPT | Performed by: NURSE PRACTITIONER

## 2021-07-23 PROCEDURE — 25000003 PHARM REV CODE 250: Performed by: NURSE PRACTITIONER

## 2021-07-23 PROCEDURE — 63600175 PHARM REV CODE 636 W HCPCS: Performed by: NURSE PRACTITIONER

## 2021-07-23 PROCEDURE — 0002A HC IMMUNIZ ADMIN, SARS-COV-2 COVID-19 VACC, 30MCG/0.3ML, 2ND DOSE: CPT | Performed by: NURSE PRACTITIONER

## 2021-07-23 PROCEDURE — 80197 ASSAY OF TACROLIMUS: CPT | Performed by: NURSE PRACTITIONER

## 2021-07-23 PROCEDURE — 80053 COMPREHEN METABOLIC PANEL: CPT | Performed by: NURSE PRACTITIONER

## 2021-07-23 PROCEDURE — 99233 SBSQ HOSP IP/OBS HIGH 50: CPT | Mod: ,,, | Performed by: NURSE PRACTITIONER

## 2021-07-23 PROCEDURE — 63600175 PHARM REV CODE 636 W HCPCS: Performed by: INTERNAL MEDICINE

## 2021-07-23 RX ORDER — LANOLIN ALCOHOL/MO/W.PET/CERES
400 CREAM (GRAM) TOPICAL 3 TIMES DAILY
Qty: 90 TABLET | Refills: 11 | Status: SHIPPED | OUTPATIENT
Start: 2021-07-23 | End: 2022-01-27 | Stop reason: SDUPTHER

## 2021-07-23 RX ORDER — AMOXICILLIN AND CLAVULANATE POTASSIUM 875; 125 MG/1; MG/1
1 TABLET, FILM COATED ORAL EVERY 12 HOURS
Qty: 14 TABLET | Refills: 0 | Status: SHIPPED | OUTPATIENT
Start: 2021-07-23 | End: 2023-09-11 | Stop reason: ALTCHOICE

## 2021-07-23 RX ORDER — LANOLIN ALCOHOL/MO/W.PET/CERES
400 CREAM (GRAM) TOPICAL 3 TIMES DAILY
Status: DISCONTINUED | OUTPATIENT
Start: 2021-07-23 | End: 2021-07-23 | Stop reason: HOSPADM

## 2021-07-23 RX ORDER — TACROLIMUS 1 MG/1
CAPSULE ORAL
Qty: 120 CAPSULE | Refills: 11
Start: 2021-07-23 | End: 2021-10-26

## 2021-07-23 RX ORDER — TACROLIMUS 1 MG/1
CAPSULE ORAL
Qty: 120 CAPSULE | Refills: 11 | Status: SHIPPED | OUTPATIENT
Start: 2021-07-23 | End: 2021-07-23 | Stop reason: SDUPTHER

## 2021-07-23 RX ADMIN — SULFAMETHOXAZOLE AND TRIMETHOPRIM 1 TABLET: 400; 80 TABLET ORAL at 08:07

## 2021-07-23 RX ADMIN — ASPIRIN 81 MG CHEWABLE TABLET 81 MG: 81 TABLET CHEWABLE at 08:07

## 2021-07-23 RX ADMIN — PREDNISONE 10 MG: 10 TABLET ORAL at 08:07

## 2021-07-23 RX ADMIN — CHOLECALCIFEROL (VITAMIN D3) 25 MCG (1,000 UNIT) TABLET 1000 UNITS: TABLET at 08:07

## 2021-07-23 RX ADMIN — TACROLIMUS 2 MG: 1 CAPSULE ORAL at 08:07

## 2021-07-23 RX ADMIN — RNA INGREDIENT BNT-162B2 0.3 ML: 0.23 INJECTION, SUSPENSION INTRAMUSCULAR at 02:07

## 2021-07-23 RX ADMIN — PRAVASTATIN SODIUM 40 MG: 40 TABLET ORAL at 08:07

## 2021-07-23 RX ADMIN — Medication 400 MG: at 02:07

## 2021-07-23 RX ADMIN — AMOXICILLIN AND CLAVULANATE POTASSIUM 1 TABLET: 875; 125 TABLET, FILM COATED ORAL at 08:07

## 2021-07-23 RX ADMIN — Medication 400 MG: at 08:07

## 2021-07-23 RX ADMIN — MYCOPHENOLATE MOFETIL 1500 MG: 200 POWDER, FOR SUSPENSION ORAL at 08:07

## 2021-07-25 LAB
BACTERIA BLD CULT: NORMAL
BACTERIA BLD CULT: NORMAL

## 2021-07-26 LAB — BACTERIA SPEC ANAEROBE CULT: NORMAL

## 2021-07-29 ENCOUNTER — PATIENT MESSAGE (OUTPATIENT)
Dept: TRANSPLANT | Facility: CLINIC | Age: 21
End: 2021-07-29

## 2021-07-29 ENCOUNTER — OFFICE VISIT (OUTPATIENT)
Dept: INFECTIOUS DISEASES | Facility: CLINIC | Age: 21
End: 2021-07-29
Payer: MEDICAID

## 2021-07-29 DIAGNOSIS — L02.211 ABSCESS OF ABDOMINAL WALL: Primary | ICD-10-CM

## 2021-07-29 PROCEDURE — 99214 PR OFFICE/OUTPT VISIT, EST, LEVL IV, 30-39 MIN: ICD-10-PCS | Mod: 95,,, | Performed by: INTERNAL MEDICINE

## 2021-07-29 PROCEDURE — 99214 OFFICE O/P EST MOD 30 MIN: CPT | Mod: 95,,, | Performed by: INTERNAL MEDICINE

## 2021-07-30 ENCOUNTER — TELEPHONE (OUTPATIENT)
Dept: TRANSPLANT | Facility: HOSPITAL | Age: 21
End: 2021-07-30

## 2021-08-05 ENCOUNTER — PATIENT MESSAGE (OUTPATIENT)
Dept: TRANSPLANT | Facility: CLINIC | Age: 21
End: 2021-08-05

## 2021-08-05 ENCOUNTER — TELEPHONE (OUTPATIENT)
Dept: TRANSPLANT | Facility: CLINIC | Age: 21
End: 2021-08-05

## 2021-08-05 ENCOUNTER — INFUSION (OUTPATIENT)
Dept: INFUSION THERAPY | Facility: HOSPITAL | Age: 21
End: 2021-08-05
Payer: MEDICAID

## 2021-08-05 VITALS
BODY MASS INDEX: 23.05 KG/M2 | DIASTOLIC BLOOD PRESSURE: 60 MMHG | WEIGHT: 155.63 LBS | HEIGHT: 69 IN | RESPIRATION RATE: 18 BRPM | TEMPERATURE: 98 F | HEART RATE: 112 BPM | OXYGEN SATURATION: 99 % | SYSTOLIC BLOOD PRESSURE: 110 MMHG

## 2021-08-05 DIAGNOSIS — T86.21 CARDIAC TRANSPLANT REJECTION: Primary | ICD-10-CM

## 2021-08-05 PROCEDURE — 96375 TX/PRO/DX INJ NEW DRUG ADDON: CPT

## 2021-08-05 PROCEDURE — 63600175 PHARM REV CODE 636 W HCPCS: Mod: JG | Performed by: PHARMACIST

## 2021-08-05 PROCEDURE — 96365 THER/PROPH/DIAG IV INF INIT: CPT

## 2021-08-05 PROCEDURE — 25000003 PHARM REV CODE 250: Performed by: INTERNAL MEDICINE

## 2021-08-05 PROCEDURE — 25000003 PHARM REV CODE 250: Performed by: PHARMACIST

## 2021-08-05 PROCEDURE — 96367 TX/PROPH/DG ADDL SEQ IV INF: CPT

## 2021-08-05 PROCEDURE — 63600175 PHARM REV CODE 636 W HCPCS: Performed by: INTERNAL MEDICINE

## 2021-08-05 PROCEDURE — 96366 THER/PROPH/DIAG IV INF ADDON: CPT

## 2021-08-05 RX ORDER — SODIUM CHLORIDE 0.9 % (FLUSH) 0.9 %
10 SYRINGE (ML) INJECTION
Status: CANCELLED | OUTPATIENT
Start: 2021-08-06

## 2021-08-05 RX ORDER — DIPHENHYDRAMINE HYDROCHLORIDE 50 MG/ML
25 INJECTION INTRAMUSCULAR; INTRAVENOUS
Status: COMPLETED | OUTPATIENT
Start: 2021-08-05 | End: 2021-08-05

## 2021-08-05 RX ORDER — HEPARIN 100 UNIT/ML
500 SYRINGE INTRAVENOUS
Status: DISCONTINUED | OUTPATIENT
Start: 2021-08-05 | End: 2021-08-05 | Stop reason: HOSPADM

## 2021-08-05 RX ORDER — FAMOTIDINE 10 MG/ML
20 INJECTION INTRAVENOUS DAILY
Status: CANCELLED | OUTPATIENT
Start: 2021-08-06

## 2021-08-05 RX ORDER — HEPARIN 100 UNIT/ML
500 SYRINGE INTRAVENOUS
Status: CANCELLED | OUTPATIENT
Start: 2021-08-05

## 2021-08-05 RX ORDER — DIPHENHYDRAMINE HYDROCHLORIDE 50 MG/ML
25 INJECTION INTRAMUSCULAR; INTRAVENOUS
Status: DISCONTINUED | OUTPATIENT
Start: 2021-08-05 | End: 2021-08-05 | Stop reason: HOSPADM

## 2021-08-05 RX ORDER — MEPERIDINE HYDROCHLORIDE 50 MG/ML
25 INJECTION INTRAMUSCULAR; INTRAVENOUS; SUBCUTANEOUS
Status: DISCONTINUED | OUTPATIENT
Start: 2021-08-05 | End: 2021-08-05 | Stop reason: HOSPADM

## 2021-08-05 RX ORDER — DIPHENHYDRAMINE HCL 50 MG
50 CAPSULE ORAL
Status: DISCONTINUED | OUTPATIENT
Start: 2021-08-05 | End: 2021-08-05 | Stop reason: HOSPADM

## 2021-08-05 RX ORDER — SODIUM CHLORIDE 0.9 % (FLUSH) 0.9 %
10 SYRINGE (ML) INJECTION
Status: DISCONTINUED | OUTPATIENT
Start: 2021-08-05 | End: 2021-08-05 | Stop reason: HOSPADM

## 2021-08-05 RX ORDER — MEPERIDINE HYDROCHLORIDE 50 MG/ML
25 INJECTION INTRAMUSCULAR; INTRAVENOUS; SUBCUTANEOUS
Status: CANCELLED | OUTPATIENT
Start: 2021-08-05

## 2021-08-05 RX ORDER — ACETAMINOPHEN 325 MG/1
650 TABLET ORAL DAILY
Status: CANCELLED | OUTPATIENT
Start: 2021-08-06

## 2021-08-05 RX ORDER — HEPARIN 100 UNIT/ML
500 SYRINGE INTRAVENOUS
Status: CANCELLED | OUTPATIENT
Start: 2021-08-06

## 2021-08-05 RX ORDER — ACETAMINOPHEN 325 MG/1
650 TABLET ORAL DAILY
Status: DISCONTINUED | OUTPATIENT
Start: 2021-08-05 | End: 2021-08-05 | Stop reason: HOSPADM

## 2021-08-05 RX ORDER — DIPHENHYDRAMINE HCL 50 MG
50 CAPSULE ORAL
Status: CANCELLED | OUTPATIENT
Start: 2021-08-05

## 2021-08-05 RX ORDER — ACETAMINOPHEN 325 MG/1
650 TABLET ORAL
Status: COMPLETED | OUTPATIENT
Start: 2021-08-05 | End: 2021-08-05

## 2021-08-05 RX ORDER — ACETAMINOPHEN 325 MG/1
650 TABLET ORAL
Status: CANCELLED | OUTPATIENT
Start: 2021-08-05

## 2021-08-05 RX ORDER — SODIUM CHLORIDE 0.9 % (FLUSH) 0.9 %
10 SYRINGE (ML) INJECTION
Status: CANCELLED | OUTPATIENT
Start: 2021-08-05

## 2021-08-05 RX ORDER — METHYLPREDNISOLONE SOD SUCC 125 MG
40 VIAL (EA) INJECTION
Status: CANCELLED | OUTPATIENT
Start: 2021-08-05

## 2021-08-05 RX ORDER — FAMOTIDINE 10 MG/ML
20 INJECTION INTRAVENOUS DAILY
Status: DISCONTINUED | OUTPATIENT
Start: 2021-08-05 | End: 2021-08-05 | Stop reason: HOSPADM

## 2021-08-05 RX ADMIN — ACETAMINOPHEN 650 MG: 325 TABLET ORAL at 07:08

## 2021-08-05 RX ADMIN — RITUXIMAB 1000 MG: 10 INJECTION, SOLUTION INTRAVENOUS at 08:08

## 2021-08-05 RX ADMIN — DIPHENHYDRAMINE HYDROCHLORIDE 25 MG: 50 INJECTION INTRAMUSCULAR; INTRAVENOUS at 09:08

## 2021-08-05 RX ADMIN — METHYLPREDNISOLONE SODIUM SUCCINATE 40 MG: 40 INJECTION, POWDER, FOR SOLUTION INTRAMUSCULAR; INTRAVENOUS at 07:08

## 2021-08-05 RX ADMIN — FAMOTIDINE 20 MG: 10 INJECTION INTRAVENOUS at 11:08

## 2021-08-05 RX ADMIN — DIPHENHYDRAMINE HYDROCHLORIDE 50 MG: 50 INJECTION, SOLUTION INTRAMUSCULAR; INTRAVENOUS at 07:08

## 2021-08-05 RX ADMIN — HUMAN IMMUNOGLOBULIN G 60 G: 20 LIQUID INTRAVENOUS at 11:08

## 2021-08-05 RX ADMIN — SODIUM CHLORIDE: 9 INJECTION, SOLUTION INTRAVENOUS at 07:08

## 2021-08-06 ENCOUNTER — INFUSION (OUTPATIENT)
Dept: INFUSION THERAPY | Facility: HOSPITAL | Age: 21
End: 2021-08-06
Payer: MEDICAID

## 2021-08-06 VITALS
DIASTOLIC BLOOD PRESSURE: 64 MMHG | HEART RATE: 113 BPM | SYSTOLIC BLOOD PRESSURE: 123 MMHG | TEMPERATURE: 98 F | OXYGEN SATURATION: 100 % | RESPIRATION RATE: 18 BRPM

## 2021-08-06 DIAGNOSIS — T86.21 CARDIAC TRANSPLANT REJECTION: Primary | ICD-10-CM

## 2021-08-06 PROCEDURE — 25000003 PHARM REV CODE 250: Performed by: PHARMACIST

## 2021-08-06 PROCEDURE — 96367 TX/PROPH/DG ADDL SEQ IV INF: CPT

## 2021-08-06 PROCEDURE — 96366 THER/PROPH/DIAG IV INF ADDON: CPT

## 2021-08-06 PROCEDURE — 96375 TX/PRO/DX INJ NEW DRUG ADDON: CPT

## 2021-08-06 PROCEDURE — 96365 THER/PROPH/DIAG IV INF INIT: CPT

## 2021-08-06 PROCEDURE — 63600175 PHARM REV CODE 636 W HCPCS: Mod: JG | Performed by: PHARMACIST

## 2021-08-06 RX ORDER — FAMOTIDINE 10 MG/ML
20 INJECTION INTRAVENOUS DAILY
Status: CANCELLED | OUTPATIENT
Start: 2021-08-07

## 2021-08-06 RX ORDER — SODIUM CHLORIDE 0.9 % (FLUSH) 0.9 %
10 SYRINGE (ML) INJECTION
Status: CANCELLED | OUTPATIENT
Start: 2021-08-07

## 2021-08-06 RX ORDER — ACETAMINOPHEN 325 MG/1
650 TABLET ORAL DAILY
Status: DISCONTINUED | OUTPATIENT
Start: 2021-08-06 | End: 2021-08-06 | Stop reason: HOSPADM

## 2021-08-06 RX ORDER — ACETAMINOPHEN 325 MG/1
650 TABLET ORAL DAILY
Status: CANCELLED | OUTPATIENT
Start: 2021-08-07

## 2021-08-06 RX ORDER — FAMOTIDINE 10 MG/ML
20 INJECTION INTRAVENOUS DAILY
Status: DISCONTINUED | OUTPATIENT
Start: 2021-08-06 | End: 2021-08-06 | Stop reason: HOSPADM

## 2021-08-06 RX ORDER — HEPARIN 100 UNIT/ML
500 SYRINGE INTRAVENOUS
Status: CANCELLED | OUTPATIENT
Start: 2021-08-07

## 2021-08-06 RX ADMIN — FAMOTIDINE 20 MG: 10 INJECTION INTRAVENOUS at 08:08

## 2021-08-06 RX ADMIN — ACETAMINOPHEN 650 MG: 325 TABLET ORAL at 08:08

## 2021-08-06 RX ADMIN — HUMAN IMMUNOGLOBULIN G 60 G: 40 LIQUID INTRAVENOUS at 08:08

## 2021-08-06 RX ADMIN — DIPHENHYDRAMINE HYDROCHLORIDE 50 MG: 50 INJECTION, SOLUTION INTRAMUSCULAR; INTRAVENOUS at 08:08

## 2021-08-16 NOTE — NURSING TRANSFER
Nursing Transfer Note    Receiving Transfer Note    3/21/2017 10:35 PM  Received in transfer from PICU 23 to [facility]  Report received as documented in PER Handoff on Doc Flowsheet.  See Doc Flowsheet for VS's and complete assessment.  Continuous EKG monitoring in place Yes  Chart received with patient: Yes  What Caregiver / Guardian was Notified of Arrival: Mother at bedside  Patient and / or caregiver / guardian oriented to room and nurse call system.    SATHYA Cardoan RN  3/21/2017 10:35 PM    
Nursing Transfer Note    Receiving Transfer Note    3/21/2017 4:14 PM  Received in transfer from cath lab to PICU 23  Report received as documented in PER Handoff on Doc Flowsheet.  See Doc Flowsheet for VS's and complete assessment.  Continuous EKG monitoring in place Yes  Chart received with patient: Yes  What Caregiver / Guardian was Notified of Arrival: Parents  Patient and / or caregiver / guardian oriented to room and nurse call system.  MAR Hodge RN  3/21/2017 4:14 PM    
Nursing Transfer Note    Sending Transfer Note      3/21/2017 10:30 PM  Transfer via stretcher  From PICU 23 to 440  Transfered with telemetry  Transported by: RNs  Report given as documented in PER Handoff on Doc Flowsheet  VS's per Doc Flowsheet  Medicines sent: Yes  Chart sent with patient: Yes  What caregiver / guardian was Notified of transfer: Mother  RUSSELLHolly Juan RN  3/21/2017 10:30 PM            
Nursing Transfer Note    Sending Transfer Note      3/21/2017 2:20 PM  Transfer via wheelchair  From Peds Floor Room 440 to cath lab  Transfered with mother  Transported by: transport  Report given as documented in PER Handoff on Doc Flowsheet  VS's per Doc Flowsheet  Medicines sent: n/a  Chart sent with patient: yes  What caregiver / guardian was Notified of transfer: mother with patient  Bree Lyles RN  3/21/2017 2:20 PM           
Statement Selected

## 2021-08-24 LAB — FUNGUS SPEC CULT: NORMAL

## 2021-08-31 ENCOUNTER — PATIENT MESSAGE (OUTPATIENT)
Dept: TRANSPLANT | Facility: CLINIC | Age: 21
End: 2021-08-31

## 2021-09-07 ENCOUNTER — HISTORICAL (OUTPATIENT)
Dept: LAB | Facility: HOSPITAL | Age: 21
End: 2021-09-07

## 2021-09-07 LAB
ABS NEUT (OLG): 3.79 X10(3)/MCL (ref 2.1–9.2)
ALBUMIN SERPL-MCNC: 4 GM/DL (ref 3.5–5)
ALBUMIN/GLOB SERPL: 1.1 RATIO (ref 1.1–2)
ALP SERPL-CCNC: 88 UNIT/L (ref 40–150)
ALT SERPL-CCNC: 22 UNIT/L (ref 0–55)
AST SERPL-CCNC: 17 UNIT/L (ref 5–34)
BILIRUB SERPL-MCNC: 0.6 MG/DL (ref 0.2–1.2)
BILIRUBIN DIRECT+TOT PNL SERPL-MCNC: 0.2 MG/DL (ref 0–0.5)
BILIRUBIN DIRECT+TOT PNL SERPL-MCNC: 0.4 MG/DL (ref 0–0.8)
BNP BLD-MCNC: 45.2 PG/ML
BUN SERPL-MCNC: 11.2 MG/DL (ref 8.9–20.6)
CALCIUM SERPL-MCNC: 10.1 MG/DL (ref 8.4–10.2)
CHLORIDE SERPL-SCNC: 105 MMOL/L (ref 98–107)
CHOLEST SERPL-MCNC: 166 MG/DL
CHOLEST/HDLC SERPL: 4 {RATIO} (ref 0–5)
CO2 SERPL-SCNC: 26 MMOL/L (ref 22–29)
CREAT SERPL-MCNC: 0.79 MG/DL (ref 0.72–1.25)
ERYTHROCYTE [DISTWIDTH] IN BLOOD BY AUTOMATED COUNT: 13.6 % (ref 11.5–17)
GLOBULIN SER-MCNC: 3.6 GM/DL (ref 2.4–3.5)
GLUCOSE SERPL-MCNC: 95 MG/DL (ref 74–100)
HCT VFR BLD AUTO: 49.5 % (ref 42–52)
HDLC SERPL-MCNC: 47 MG/DL (ref 40–60)
HGB BLD-MCNC: 16.2 GM/DL (ref 14–18)
LDLC SERPL CALC-MCNC: 93 MG/DL (ref 50–140)
MAGNESIUM SERPL-MCNC: 1.6 MG/DL (ref 1.6–2.6)
MCH RBC QN AUTO: 26.8 PG (ref 27–31)
MCHC RBC AUTO-ENTMCNC: 32.7 GM/DL (ref 33–36)
MCV RBC AUTO: 81.8 FL (ref 80–94)
NRBC BLD AUTO-RTO: 0 % (ref 0–0.2)
PLATELET # BLD AUTO: 228 X10(3)/MCL (ref 130–400)
PMV BLD AUTO: 11 FL (ref 7.4–10.4)
POTASSIUM SERPL-SCNC: 4 MMOL/L (ref 3.5–5.1)
PROT SERPL-MCNC: 7.6 GM/DL (ref 6.4–8.3)
RBC # BLD AUTO: 6.05 X10(6)/MCL (ref 4.7–6.1)
SODIUM SERPL-SCNC: 140 MMOL/L (ref 136–145)
TRIGL SERPL-MCNC: 128 MG/DL (ref 0–150)
VLDLC SERPL CALC-MCNC: 26 MG/DL
WBC # SPEC AUTO: 7.1 X10(3)/MCL (ref 4.5–11.5)

## 2021-09-13 ENCOUNTER — TELEPHONE (OUTPATIENT)
Dept: TRANSPLANT | Facility: CLINIC | Age: 21
End: 2021-09-13

## 2021-09-13 ENCOUNTER — PATIENT MESSAGE (OUTPATIENT)
Dept: TRANSPLANT | Facility: CLINIC | Age: 21
End: 2021-09-13

## 2021-09-13 LAB
EXT ALBUMIN: 4
EXT ALT: 22
EXT AST: 17
EXT BUN: 11.2
EXT CALCIUM: 10.1
EXT CHLORIDE: 105
EXT CHOLESTEROL (LIPID PANEL): 166
EXT CREATININE: 0.79 MG/DL
EXT GLUCOSE: 95
EXT HDL: 47
EXT HEMATOCRIT: 49.5
EXT HEMOGLOBIN: 16.2
EXT LDL CHOLESTEROL: 93
EXT MAGNESIUM: 1.6
EXT PLATELETS: 228
EXT POTASSIUM: 4
EXT PROTEIN TOTAL: 7.6
EXT SODIUM: 140 MMOL/L
EXT TACROLIMUS LVL: 8
EXT TRIGLYCERIDES: 128
EXT WBC: 7.1

## 2021-09-14 ENCOUNTER — TELEPHONE (OUTPATIENT)
Dept: TRANSPLANT | Facility: CLINIC | Age: 21
End: 2021-09-14

## 2021-09-14 ENCOUNTER — PATIENT MESSAGE (OUTPATIENT)
Dept: TRANSPLANT | Facility: CLINIC | Age: 21
End: 2021-09-14

## 2021-09-15 ENCOUNTER — HISTORICAL (OUTPATIENT)
Dept: LAB | Facility: HOSPITAL | Age: 21
End: 2021-09-15

## 2021-09-15 LAB
ABS NEUT (OLG): 6.18 X10(3)/MCL (ref 2.1–9.2)
ALBUMIN SERPL-MCNC: 4.3 GM/DL (ref 3.5–5)
ALBUMIN/GLOB SERPL: 1.2 RATIO (ref 1.1–2)
ALP SERPL-CCNC: 91 UNIT/L (ref 40–150)
ALT SERPL-CCNC: 24 UNIT/L (ref 0–55)
AST SERPL-CCNC: 20 UNIT/L (ref 5–34)
BILIRUB SERPL-MCNC: 1.1 MG/DL (ref 0.2–1.2)
BILIRUBIN DIRECT+TOT PNL SERPL-MCNC: 0.4 MG/DL (ref 0–0.5)
BILIRUBIN DIRECT+TOT PNL SERPL-MCNC: 0.7 MG/DL (ref 0–0.8)
BUN SERPL-MCNC: 12.8 MG/DL (ref 8.9–20.6)
CALCIUM SERPL-MCNC: 9.6 MG/DL (ref 8.4–10.2)
CHLORIDE SERPL-SCNC: 103 MMOL/L (ref 98–107)
CO2 SERPL-SCNC: 26 MMOL/L (ref 22–29)
CREAT SERPL-MCNC: 0.95 MG/DL (ref 0.72–1.25)
ERYTHROCYTE [DISTWIDTH] IN BLOOD BY AUTOMATED COUNT: 13.3 % (ref 11.5–17)
GLOBULIN SER-MCNC: 3.5 GM/DL (ref 2.4–3.5)
GLUCOSE SERPL-MCNC: 89 MG/DL (ref 74–100)
HCT VFR BLD AUTO: 50.9 % (ref 42–52)
HGB BLD-MCNC: 16.7 GM/DL (ref 14–18)
MCH RBC QN AUTO: 26.8 PG (ref 27–31)
MCHC RBC AUTO-ENTMCNC: 32.8 GM/DL (ref 33–36)
MCV RBC AUTO: 81.8 FL (ref 80–94)
NRBC BLD AUTO-RTO: 0 % (ref 0–0.2)
PLATELET # BLD AUTO: 242 X10(3)/MCL (ref 130–400)
PMV BLD AUTO: 10 FL (ref 7.4–10.4)
POTASSIUM SERPL-SCNC: 4.2 MMOL/L (ref 3.5–5.1)
PROT SERPL-MCNC: 7.8 GM/DL (ref 6.4–8.3)
RBC # BLD AUTO: 6.22 X10(6)/MCL (ref 4.7–6.1)
SODIUM SERPL-SCNC: 140 MMOL/L (ref 136–145)
WBC # SPEC AUTO: 11 X10(3)/MCL (ref 4.5–11.5)

## 2021-09-16 ENCOUNTER — TELEPHONE (OUTPATIENT)
Dept: TRANSPLANT | Facility: CLINIC | Age: 21
End: 2021-09-16

## 2021-09-16 LAB
EXT ALBUMIN: 4.3
EXT ALT: 24
EXT AST: 20
EXT BUN: 12.8
EXT CALCIUM: 9.6
EXT CHLORIDE: 103
EXT CREATININE: 0.95 MG/DL
EXT GLUCOSE: 89
EXT HEMATOCRIT: 50.9
EXT HEMOGLOBIN: 16.7
EXT PLATELETS: 242
EXT POTASSIUM: 4.2
EXT PROTEIN TOTAL: 7.8
EXT SODIUM: 140 MMOL/L
EXT WBC: 11

## 2021-09-21 ENCOUNTER — TELEPHONE (OUTPATIENT)
Dept: TRANSPLANT | Facility: CLINIC | Age: 21
End: 2021-09-21

## 2021-09-21 DIAGNOSIS — Z01.812 PRE-PROCEDURE LAB EXAM: ICD-10-CM

## 2021-09-21 DIAGNOSIS — Z79.899 ENCOUNTER FOR LONG-TERM (CURRENT) USE OF MEDICATIONS: ICD-10-CM

## 2021-09-21 DIAGNOSIS — Z79.52 LONG TERM CURRENT USE OF SYSTEMIC STEROIDS: ICD-10-CM

## 2021-09-21 DIAGNOSIS — I10 ESSENTIAL HYPERTENSION: ICD-10-CM

## 2021-09-21 DIAGNOSIS — E78.2 MIXED HYPERLIPIDEMIA: ICD-10-CM

## 2021-09-21 DIAGNOSIS — Z94.1 STATUS POST HEART TRANSPLANT: ICD-10-CM

## 2021-09-21 DIAGNOSIS — T86.20 COMPLICATION OF HEART TRANSPLANT, UNSPECIFIED COMPLICATION: ICD-10-CM

## 2021-09-21 DIAGNOSIS — Z13.9 SCREENING FOR UNSPECIFIED CONDITION: Primary | ICD-10-CM

## 2021-09-21 LAB — EXT TACROLIMUS LVL: 10.4

## 2021-10-12 ENCOUNTER — TELEPHONE (OUTPATIENT)
Dept: TRANSPLANT | Facility: CLINIC | Age: 21
End: 2021-10-12

## 2021-10-12 DIAGNOSIS — Z01.818 PRE-OP TESTING: ICD-10-CM

## 2021-10-12 DIAGNOSIS — Z94.1 STATUS POST HEART TRANSPLANT: ICD-10-CM

## 2021-10-12 DIAGNOSIS — Z13.9 SCREENING FOR UNSPECIFIED CONDITION: Primary | ICD-10-CM

## 2021-10-18 ENCOUNTER — PATIENT MESSAGE (OUTPATIENT)
Dept: TRANSPLANT | Facility: CLINIC | Age: 21
End: 2021-10-18
Payer: MEDICAID

## 2021-10-18 ENCOUNTER — PATIENT MESSAGE (OUTPATIENT)
Dept: TRANSPLANT | Facility: HOSPITAL | Age: 21
End: 2021-10-18
Payer: MEDICAID

## 2021-10-18 ENCOUNTER — TELEPHONE (OUTPATIENT)
Dept: TRANSPLANT | Facility: HOSPITAL | Age: 21
End: 2021-10-18

## 2021-10-26 ENCOUNTER — OFFICE VISIT (OUTPATIENT)
Dept: TRANSPLANT | Facility: CLINIC | Age: 21
End: 2021-10-26
Payer: MEDICAID

## 2021-10-26 ENCOUNTER — HOSPITAL ENCOUNTER (OUTPATIENT)
Facility: HOSPITAL | Age: 21
Discharge: HOME | End: 2021-10-26
Attending: INTERNAL MEDICINE | Admitting: INTERNAL MEDICINE
Payer: MEDICAID

## 2021-10-26 VITALS
SYSTOLIC BLOOD PRESSURE: 131 MMHG | DIASTOLIC BLOOD PRESSURE: 79 MMHG | BODY MASS INDEX: 23.25 KG/M2 | HEIGHT: 69 IN | HEART RATE: 124 BPM | WEIGHT: 156.94 LBS

## 2021-10-26 VITALS
BODY MASS INDEX: 23.11 KG/M2 | HEIGHT: 69 IN | DIASTOLIC BLOOD PRESSURE: 79 MMHG | SYSTOLIC BLOOD PRESSURE: 135 MMHG | TEMPERATURE: 98 F | OXYGEN SATURATION: 97 % | HEART RATE: 105 BPM | WEIGHT: 156 LBS | RESPIRATION RATE: 16 BRPM

## 2021-10-26 DIAGNOSIS — Z94.1 HEART REPLACED BY TRANSPLANT: ICD-10-CM

## 2021-10-26 DIAGNOSIS — Z94.1 S/P ORTHOTOPIC HEART TRANSPLANT: ICD-10-CM

## 2021-10-26 DIAGNOSIS — Z94.1 HEART TRANSPLANTED: ICD-10-CM

## 2021-10-26 DIAGNOSIS — T86.21 CARDIAC TRANSPLANT REJECTION: ICD-10-CM

## 2021-10-26 DIAGNOSIS — K31.6 GASTROCUTANEOUS FISTULA DUE TO GASTROSTOMY TUBE: ICD-10-CM

## 2021-10-26 DIAGNOSIS — I10 ESSENTIAL HYPERTENSION: ICD-10-CM

## 2021-10-26 DIAGNOSIS — Z94.1 HEART REPLACED BY TRANSPLANT: Primary | ICD-10-CM

## 2021-10-26 DIAGNOSIS — Z94.1 STATUS POST HEART TRANSPLANT: ICD-10-CM

## 2021-10-26 DIAGNOSIS — Z94.1 S/P ORTHOTOPIC HEART TRANSPLANT: Primary | ICD-10-CM

## 2021-10-26 LAB
ASCENDING AORTA: 2.6 CM
AV INDEX (PROSTH): 0.96
AV MEAN GRADIENT: 2 MMHG
AV PEAK GRADIENT: 4 MMHG
AV VALVE AREA: 2.93 CM2
AV VELOCITY RATIO: 0.88
BSA FOR ECHO PROCEDURE: 1.86 M2
CV ECHO LV RWT: 0.37 CM
DOP CALC AO PEAK VEL: 0.95 M/S
DOP CALC AO VTI: 11.61 CM
DOP CALC LVOT AREA: 3 CM2
DOP CALC LVOT DIAMETER: 1.97 CM
DOP CALC LVOT PEAK VEL: 0.84 M/S
DOP CALC LVOT STROKE VOLUME: 34 CM3
DOP CALCLVOT PEAK VEL VTI: 11.16 CM
E WAVE DECELERATION TIME: 73.12 MSEC
E/A RATIO: 1.22
E/E' RATIO: 3.87 M/S
ECHO LV POSTERIOR WALL: 0.74 CM (ref 0.6–1.1)
EJECTION FRACTION: 65 %
FRACTIONAL SHORTENING: 34 % (ref 28–44)
INTERVENTRICULAR SEPTUM: 0.89 CM (ref 0.6–1.1)
LEFT INTERNAL DIMENSION IN SYSTOLE: 2.63 CM (ref 2.1–4)
LEFT VENTRICLE DIASTOLIC VOLUME INDEX: 37.3 ML/M2
LEFT VENTRICLE DIASTOLIC VOLUME: 69.38 ML
LEFT VENTRICLE MASS INDEX: 51 G/M2
LEFT VENTRICLE SYSTOLIC VOLUME INDEX: 13.6 ML/M2
LEFT VENTRICLE SYSTOLIC VOLUME: 25.21 ML
LEFT VENTRICULAR INTERNAL DIMENSION IN DIASTOLE: 3.98 CM (ref 3.5–6)
LEFT VENTRICULAR MASS: 95.04 G
LV LATERAL E/E' RATIO: 3.33 M/S
LV SEPTAL E/E' RATIO: 4.62 M/S
MV PEAK A VEL: 0.49 M/S
MV PEAK E VEL: 0.6 M/S
MV STENOSIS PRESSURE HALF TIME: 21.21 MS
MV VALVE AREA P 1/2 METHOD: 10.37 CM2
PISA TR MAX VEL: 2.28 M/S
RIGHT VENTRICULAR END-DIASTOLIC DIMENSION: 3.19 CM
RV TISSUE DOPPLER FREE WALL SYSTOLIC VELOCITY 1 (APICAL 4 CHAMBER VIEW): 5.87 CM/S
SINUS: 2.83 CM
STJ: 2.52 CM
TDI LATERAL: 0.18 M/S
TDI SEPTAL: 0.13 M/S
TDI: 0.16 M/S
TR MAX PG: 21 MMHG
TRICUSPID ANNULAR PLANE SYSTOLIC EXCURSION: 1.19 CM

## 2021-10-26 PROCEDURE — 88307 TISSUE EXAM BY PATHOLOGIST: CPT | Mod: 26,,, | Performed by: PATHOLOGY

## 2021-10-26 PROCEDURE — C1894 INTRO/SHEATH, NON-LASER: HCPCS | Performed by: INTERNAL MEDICINE

## 2021-10-26 PROCEDURE — 93505 ENDOMYOCARDIAL BIOPSY: CPT | Mod: 26,,, | Performed by: INTERNAL MEDICINE

## 2021-10-26 PROCEDURE — 88346 IMFLUOR 1ST 1ANTB STAIN PX: CPT | Performed by: PATHOLOGY

## 2021-10-26 PROCEDURE — 99999 PR PBB SHADOW E&M-EST. PATIENT-LVL III: ICD-10-PCS | Mod: PBBFAC,,, | Performed by: INTERNAL MEDICINE

## 2021-10-26 PROCEDURE — 88346 PR IMMUNOFLUORESCENT ANTB, 1ST STAIN: ICD-10-PCS | Mod: 26,,, | Performed by: PATHOLOGY

## 2021-10-26 PROCEDURE — 27201423 OPTIME MED/SURG SUP & DEVICES STERILE SUPPLY: Performed by: INTERNAL MEDICINE

## 2021-10-26 PROCEDURE — 25000003 PHARM REV CODE 250: Performed by: INTERNAL MEDICINE

## 2021-10-26 PROCEDURE — 99999 PR PBB SHADOW E&M-EST. PATIENT-LVL III: CPT | Mod: PBBFAC,,, | Performed by: INTERNAL MEDICINE

## 2021-10-26 PROCEDURE — 93505 PR BIOPSY OF HEART LINING: ICD-10-PCS | Mod: 26,,, | Performed by: INTERNAL MEDICINE

## 2021-10-26 PROCEDURE — 99213 OFFICE O/P EST LOW 20 MIN: CPT | Mod: PBBFAC,25 | Performed by: INTERNAL MEDICINE

## 2021-10-26 PROCEDURE — 99215 PR OFFICE/OUTPT VISIT, EST, LEVL V, 40-54 MIN: ICD-10-PCS | Mod: S$PBB,,, | Performed by: INTERNAL MEDICINE

## 2021-10-26 PROCEDURE — 88346 IMFLUOR 1ST 1ANTB STAIN PX: CPT | Mod: 26,,, | Performed by: PATHOLOGY

## 2021-10-26 PROCEDURE — 88342 IMHCHEM/IMCYTCHM 1ST ANTB: CPT | Performed by: PATHOLOGY

## 2021-10-26 PROCEDURE — 88342 IMHCHEM/IMCYTCHM 1ST ANTB: CPT | Mod: 26,,, | Performed by: PATHOLOGY

## 2021-10-26 PROCEDURE — 88307 TISSUE EXAM BY PATHOLOGIST: CPT | Performed by: PATHOLOGY

## 2021-10-26 PROCEDURE — 88342 CHG IMMUNOCYTOCHEMISTRY: ICD-10-PCS | Mod: 26,,, | Performed by: PATHOLOGY

## 2021-10-26 PROCEDURE — 99215 OFFICE O/P EST HI 40 MIN: CPT | Mod: S$PBB,,, | Performed by: INTERNAL MEDICINE

## 2021-10-26 PROCEDURE — 93505 ENDOMYOCARDIAL BIOPSY: CPT | Performed by: INTERNAL MEDICINE

## 2021-10-26 PROCEDURE — 88307 PR  SURG PATH,LEVEL V: ICD-10-PCS | Mod: 26,,, | Performed by: PATHOLOGY

## 2021-10-26 RX ORDER — LIDOCAINE HYDROCHLORIDE 20 MG/ML
INJECTION, SOLUTION INFILTRATION; PERINEURAL
Status: DISCONTINUED | OUTPATIENT
Start: 2021-10-26 | End: 2021-10-26 | Stop reason: HOSPADM

## 2021-10-26 RX ORDER — SODIUM CHLORIDE 0.9 G/100ML
IRRIGANT IRRIGATION
Status: DISCONTINUED | OUTPATIENT
Start: 2021-10-26 | End: 2021-10-26 | Stop reason: HOSPADM

## 2021-10-27 LAB
FINAL PATHOLOGIC DIAGNOSIS: NORMAL
GROSS: NORMAL
Lab: NORMAL

## 2021-10-28 ENCOUNTER — PATIENT MESSAGE (OUTPATIENT)
Dept: TRANSPLANT | Facility: CLINIC | Age: 21
End: 2021-10-28
Payer: MEDICAID

## 2021-12-03 ENCOUNTER — SOCIAL WORK (OUTPATIENT)
Dept: TRANSPLANT | Facility: CLINIC | Age: 21
End: 2021-12-03
Payer: MEDICAID

## 2021-12-03 ENCOUNTER — TELEPHONE (OUTPATIENT)
Dept: TRANSPLANT | Facility: CLINIC | Age: 21
End: 2021-12-03

## 2021-12-03 ENCOUNTER — OFFICE VISIT (OUTPATIENT)
Dept: TRANSPLANT | Facility: CLINIC | Age: 21
End: 2021-12-03
Payer: MEDICAID

## 2021-12-03 ENCOUNTER — HOSPITAL ENCOUNTER (OUTPATIENT)
Dept: CARDIOLOGY | Facility: HOSPITAL | Age: 21
Discharge: HOME | End: 2021-12-03
Attending: INTERNAL MEDICINE
Payer: MEDICAID

## 2021-12-03 VITALS
HEART RATE: 122 BPM | BODY MASS INDEX: 23.04 KG/M2 | SYSTOLIC BLOOD PRESSURE: 120 MMHG | WEIGHT: 160.94 LBS | DIASTOLIC BLOOD PRESSURE: 78 MMHG | HEIGHT: 70 IN

## 2021-12-03 VITALS
DIASTOLIC BLOOD PRESSURE: 60 MMHG | WEIGHT: 156 LBS | HEART RATE: 100 BPM | HEIGHT: 69 IN | SYSTOLIC BLOOD PRESSURE: 110 MMHG | BODY MASS INDEX: 23.11 KG/M2

## 2021-12-03 DIAGNOSIS — T86.20 COMPLICATION OF HEART TRANSPLANT, UNSPECIFIED COMPLICATION: ICD-10-CM

## 2021-12-03 DIAGNOSIS — E78.2 MIXED HYPERLIPIDEMIA: ICD-10-CM

## 2021-12-03 DIAGNOSIS — D84.821 IMMUNOSUPPRESSION DUE TO DRUG THERAPY: ICD-10-CM

## 2021-12-03 DIAGNOSIS — Z79.899 IMMUNOSUPPRESSION DUE TO DRUG THERAPY: ICD-10-CM

## 2021-12-03 DIAGNOSIS — Z94.1 STATUS POST HEART TRANSPLANT: ICD-10-CM

## 2021-12-03 DIAGNOSIS — I10 ESSENTIAL HYPERTENSION: ICD-10-CM

## 2021-12-03 DIAGNOSIS — Z79.899 ENCOUNTER FOR LONG-TERM (CURRENT) USE OF MEDICATIONS: ICD-10-CM

## 2021-12-03 DIAGNOSIS — Z79.52 LONG TERM CURRENT USE OF SYSTEMIC STEROIDS: ICD-10-CM

## 2021-12-03 DIAGNOSIS — E78.49 OTHER HYPERLIPIDEMIA: ICD-10-CM

## 2021-12-03 DIAGNOSIS — Z94.1 S/P ORTHOTOPIC HEART TRANSPLANT: Primary | ICD-10-CM

## 2021-12-03 LAB
ASCENDING AORTA: 2.63 CM
AV INDEX (PROSTH): 0.99
AV MEAN GRADIENT: 1 MMHG
AV PEAK GRADIENT: 2 MMHG
AV VALVE AREA: 3.29 CM2
AV VELOCITY RATIO: 0.95
BSA FOR ECHO PROCEDURE: 1.86 M2
CV ECHO LV RWT: 0.37 CM
DOP CALC AO PEAK VEL: 0.79 M/S
DOP CALC AO VTI: 11.52 CM
DOP CALC LVOT AREA: 3.3 CM2
DOP CALC LVOT DIAMETER: 2.06 CM
DOP CALC LVOT PEAK VEL: 0.75 M/S
DOP CALC LVOT STROKE VOLUME: 37.88 CM3
DOP CALCLVOT PEAK VEL VTI: 11.37 CM
E WAVE DECELERATION TIME: 101.43 MSEC
E/A RATIO: 1.42
E/E' RATIO: 4.32 M/S
ECHO LV POSTERIOR WALL: 0.77 CM (ref 0.6–1.1)
EJECTION FRACTION: 60 %
FRACTIONAL SHORTENING: 34 % (ref 28–44)
INTERVENTRICULAR SEPTUM: 0.86 CM (ref 0.6–1.1)
IVRT: 62.8 MSEC
LEFT INTERNAL DIMENSION IN SYSTOLE: 2.71 CM (ref 2.1–4)
LEFT VENTRICLE DIASTOLIC VOLUME INDEX: 40.1 ML/M2
LEFT VENTRICLE DIASTOLIC VOLUME: 74.58 ML
LEFT VENTRICLE MASS INDEX: 54 G/M2
LEFT VENTRICLE SYSTOLIC VOLUME INDEX: 14.6 ML/M2
LEFT VENTRICLE SYSTOLIC VOLUME: 27.17 ML
LEFT VENTRICULAR INTERNAL DIMENSION IN DIASTOLE: 4.11 CM (ref 3.5–6)
LEFT VENTRICULAR MASS: 100.18 G
LV LATERAL E/E' RATIO: 3 M/S
LV SEPTAL E/E' RATIO: 7.71 M/S
MV PEAK A VEL: 0.38 M/S
MV PEAK E VEL: 0.54 M/S
MV STENOSIS PRESSURE HALF TIME: 29.42 MS
MV VALVE AREA P 1/2 METHOD: 7.48 CM2
PISA TR MAX VEL: 1.92 M/S
RA PRESSURE: 3 MMHG
RIGHT VENTRICULAR END-DIASTOLIC DIMENSION: 4.04 CM
RV TISSUE DOPPLER FREE WALL SYSTOLIC VELOCITY 1 (APICAL 4 CHAMBER VIEW): 6.34 CM/S
SINUS: 2.98 CM
STJ: 2.49 CM
TDI LATERAL: 0.18 M/S
TDI SEPTAL: 0.07 M/S
TDI: 0.13 M/S
TR MAX PG: 15 MMHG
TRICUSPID ANNULAR PLANE SYSTOLIC EXCURSION: 0.92 CM
TV REST PULMONARY ARTERY PRESSURE: 18 MMHG

## 2021-12-03 PROCEDURE — 93306 TTE W/DOPPLER COMPLETE: CPT | Mod: 26,,, | Performed by: INTERNAL MEDICINE

## 2021-12-03 PROCEDURE — 99215 PR OFFICE/OUTPT VISIT, EST, LEVL V, 40-54 MIN: ICD-10-PCS | Mod: S$PBB,,, | Performed by: INTERNAL MEDICINE

## 2021-12-03 PROCEDURE — 99999 PR PBB SHADOW E&M-EST. PATIENT-LVL III: CPT | Mod: PBBFAC,,, | Performed by: INTERNAL MEDICINE

## 2021-12-03 PROCEDURE — 93306 ECHO (CUPID ONLY): ICD-10-PCS | Mod: 26,,, | Performed by: INTERNAL MEDICINE

## 2021-12-03 PROCEDURE — 99999 PR PBB SHADOW E&M-EST. PATIENT-LVL III: ICD-10-PCS | Mod: PBBFAC,,, | Performed by: INTERNAL MEDICINE

## 2021-12-03 PROCEDURE — 99215 OFFICE O/P EST HI 40 MIN: CPT | Mod: S$PBB,,, | Performed by: INTERNAL MEDICINE

## 2021-12-03 PROCEDURE — 99213 OFFICE O/P EST LOW 20 MIN: CPT | Mod: PBBFAC,25 | Performed by: INTERNAL MEDICINE

## 2021-12-03 PROCEDURE — 93306 TTE W/DOPPLER COMPLETE: CPT

## 2021-12-07 ENCOUNTER — TELEPHONE (OUTPATIENT)
Dept: TRANSPLANT | Facility: CLINIC | Age: 21
End: 2021-12-07
Payer: MEDICAID

## 2021-12-08 ENCOUNTER — HISTORICAL (OUTPATIENT)
Dept: LAB | Facility: HOSPITAL | Age: 21
End: 2021-12-08

## 2021-12-08 LAB
ABS NEUT (OLG): 4.24 X10(3)/MCL (ref 2.1–9.2)
BNP BLD-MCNC: 18.8 PG/ML
BUN SERPL-MCNC: 11.2 MG/DL (ref 8.9–20.6)
CALCIUM SERPL-MCNC: 9.5 MG/DL (ref 8.4–10.2)
CHLORIDE SERPL-SCNC: 104 MMOL/L (ref 98–107)
CO2 SERPL-SCNC: 26 MMOL/L (ref 22–29)
CREAT SERPL-MCNC: 0.91 MG/DL (ref 0.72–1.25)
CREAT/UREA NIT SERPL: 12
ERYTHROCYTE [DISTWIDTH] IN BLOOD BY AUTOMATED COUNT: 13.2 % (ref 11.5–17)
GLUCOSE SERPL-MCNC: 88 MG/DL (ref 74–100)
HCT VFR BLD AUTO: 51.6 % (ref 42–52)
HGB BLD-MCNC: 17.3 GM/DL (ref 14–18)
MCH RBC QN AUTO: 26.9 PG (ref 27–31)
MCHC RBC AUTO-ENTMCNC: 33.5 GM/DL (ref 33–36)
MCV RBC AUTO: 80.4 FL (ref 80–94)
NRBC BLD AUTO-RTO: 0 % (ref 0–0.2)
PLATELET # BLD AUTO: 205 X10(3)/MCL (ref 130–400)
PMV BLD AUTO: 10.8 FL (ref 7.4–10.4)
POTASSIUM SERPL-SCNC: 3.8 MMOL/L (ref 3.5–5.1)
RBC # BLD AUTO: 6.42 X10(6)/MCL (ref 4.7–6.1)
SODIUM SERPL-SCNC: 140 MMOL/L (ref 136–145)
WBC # SPEC AUTO: 8.6 X10(3)/MCL (ref 4.5–11.5)

## 2021-12-09 ENCOUNTER — SOCIAL WORK (OUTPATIENT)
Dept: TRANSPLANT | Facility: CLINIC | Age: 21
End: 2021-12-09
Payer: MEDICAID

## 2021-12-15 ENCOUNTER — HISTORICAL (OUTPATIENT)
Dept: LAB | Facility: HOSPITAL | Age: 21
End: 2021-12-15

## 2021-12-15 LAB
ABS NEUT (OLG): 4.31 X10(3)/MCL (ref 2.1–9.2)
ALBUMIN SERPL-MCNC: 4.7 GM/DL (ref 3.5–5)
ALBUMIN/GLOB SERPL: 1.5 RATIO (ref 1.1–2)
ALP SERPL-CCNC: 101 UNIT/L (ref 40–150)
ALT SERPL-CCNC: 22 UNIT/L (ref 0–55)
AST SERPL-CCNC: 21 UNIT/L (ref 5–34)
BASOPHILS # BLD AUTO: 0.04 X10(3)/MCL (ref 0–0.2)
BASOPHILS NFR BLD AUTO: 0.4 % (ref 0–0.9)
BILIRUB SERPL-MCNC: 0.8 MG/DL (ref 0.2–1.2)
BILIRUBIN DIRECT+TOT PNL SERPL-MCNC: 0.3 MG/DL (ref 0–0.5)
BILIRUBIN DIRECT+TOT PNL SERPL-MCNC: 0.5 MG/DL (ref 0–0.8)
BNP BLD-MCNC: 19.4 PG/ML
BUN SERPL-MCNC: 11.5 MG/DL (ref 8.9–20.6)
CALCIUM SERPL-MCNC: 10.1 MG/DL (ref 8.4–10.2)
CHLORIDE SERPL-SCNC: 102 MMOL/L (ref 98–107)
CO2 SERPL-SCNC: 28 MMOL/L (ref 22–29)
CREAT SERPL-MCNC: 0.96 MG/DL (ref 0.72–1.25)
EOSINOPHIL # BLD AUTO: 0.37 X10(3)/MCL (ref 0–0.9)
EOSINOPHIL NFR BLD AUTO: 3.9 % (ref 0–6.5)
ERYTHROCYTE [DISTWIDTH] IN BLOOD BY AUTOMATED COUNT: 13.2 % (ref 11.5–17)
GLOBULIN SER-MCNC: 3.1 GM/DL (ref 2.4–3.5)
GLUCOSE SERPL-MCNC: 86 MG/DL (ref 74–100)
HCT VFR BLD AUTO: 51.6 % (ref 42–52)
HGB BLD-MCNC: 16.8 GM/DL (ref 14–18)
IMM GRANULOCYTES # BLD AUTO: 0.09 10*3/UL (ref 0–0.02)
IMM GRANULOCYTES NFR BLD AUTO: 0.9 % (ref 0–0.43)
LYMPHOCYTES # BLD AUTO: 3.88 X10(3)/MCL (ref 0.6–4.6)
LYMPHOCYTES NFR BLD AUTO: 40.8 % (ref 16.2–38.3)
MCH RBC QN AUTO: 26.7 PG (ref 27–31)
MCHC RBC AUTO-ENTMCNC: 32.6 GM/DL (ref 33–36)
MCV RBC AUTO: 81.9 FL (ref 80–94)
MONOCYTES # BLD AUTO: 0.82 X10(3)/MCL (ref 0.1–1.3)
MONOCYTES NFR BLD AUTO: 8.6 % (ref 4.7–11.3)
NEUTROPHILS # BLD AUTO: 4.31 X10(3)/MCL (ref 2.1–9.2)
NEUTROPHILS NFR BLD AUTO: 45.4 % (ref 49.1–73.4)
NRBC BLD AUTO-RTO: 0 % (ref 0–0.2)
PLATELET # BLD AUTO: 251 X10(3)/MCL (ref 130–400)
PMV BLD AUTO: 10.3 FL (ref 7.4–10.4)
POTASSIUM SERPL-SCNC: 4 MMOL/L (ref 3.5–5.1)
PROT SERPL-MCNC: 7.8 GM/DL (ref 6.4–8.3)
RBC # BLD AUTO: 6.3 X10(6)/MCL (ref 4.7–6.1)
SODIUM SERPL-SCNC: 141 MMOL/L (ref 136–145)
WBC # SPEC AUTO: 9.5 X10(3)/MCL (ref 4.5–11.5)

## 2021-12-17 ENCOUNTER — TELEPHONE (OUTPATIENT)
Dept: TRANSPLANT | Facility: CLINIC | Age: 21
End: 2021-12-17
Payer: MEDICAID

## 2021-12-17 LAB
BNP (B-TYPE NATRIURETIC PEP): 19.4
EXT ALBUMIN: 4.7
EXT ALT: 22
EXT AST: 21
EXT BUN: 11.5
EXT CALCIUM: 10.1
EXT CHLORIDE: 102
EXT CREATININE: 0.96 MG/DL
EXT GLUCOSE: 86
EXT HEMATOCRIT: 51.6
EXT HEMOGLOBIN: 16.8
EXT PLATELETS: 251
EXT POTASSIUM: 4
EXT PROTEIN TOTAL: 7.8
EXT SODIUM: 141 MMOL/L
EXT TACROLIMUS LVL: 11.9
EXT WBC: 9.5

## 2022-01-20 ENCOUNTER — PATIENT MESSAGE (OUTPATIENT)
Dept: TRANSPLANT | Facility: CLINIC | Age: 22
End: 2022-01-20
Payer: MEDICAID

## 2022-01-20 RX ORDER — SULFAMETHOXAZOLE AND TRIMETHOPRIM 400; 80 MG/1; MG/1
1 TABLET ORAL DAILY
Qty: 90 TABLET | Refills: 1 | Status: SHIPPED | OUTPATIENT
Start: 2022-01-20 | End: 2022-07-21

## 2022-01-27 ENCOUNTER — PATIENT MESSAGE (OUTPATIENT)
Dept: TRANSPLANT | Facility: CLINIC | Age: 22
End: 2022-01-27
Payer: MEDICAID

## 2022-01-28 RX ORDER — LANOLIN ALCOHOL/MO/W.PET/CERES
400 CREAM (GRAM) TOPICAL 3 TIMES DAILY
Qty: 90 TABLET | Refills: 11 | Status: SHIPPED | OUTPATIENT
Start: 2022-01-28 | End: 2022-02-18

## 2022-02-03 ENCOUNTER — TELEPHONE (OUTPATIENT)
Dept: TRANSPLANT | Facility: CLINIC | Age: 22
End: 2022-02-03
Payer: MEDICAID

## 2022-02-03 ENCOUNTER — PATIENT MESSAGE (OUTPATIENT)
Dept: CARDIOLOGY | Facility: CLINIC | Age: 22
End: 2022-02-03
Payer: MEDICAID

## 2022-02-03 NOTE — TELEPHONE ENCOUNTER
----- Message from Chloe Palmer MA sent at 2/3/2022  3:13 PM CST -----  Please call Rebecca from [facility] she need to talk to the nurse about the patient medication Magnesium the patient is  at the pharmacy please call [phone]. Thank you    I called pharmacy and pt and Mom were unaware of taking the magnesium three times per day which was on the Rx. They said they will continue to take it two times per day until they get lab and/or hear from us.

## 2022-02-08 ENCOUNTER — TELEPHONE (OUTPATIENT)
Dept: TRANSPLANT | Facility: HOSPITAL | Age: 22
End: 2022-02-08
Payer: MEDICAID

## 2022-02-08 NOTE — TELEPHONE ENCOUNTER
SW rec'd below message from post-coordinator TAJ Hernandez RN requesting change in Pt's [facility] reservation for upcoming appts. SW sent updated request form. Notified by  team that confirmation # [id number] remains the same and change has been made. Post-coordinator notified. SW remains available.      ----- Message from Caitlin Hernandez sent at 2/7/2022  5:20 PM CST -----  Regarding: appts rescheduled, can we move the  reservations for 2/10 to 2/17 for him and his mother  Please, His appts rescheduled, can we move the  reservations for 2/10 to 2/17 for him and his mother.     Thank you,   Caitlin

## 2022-02-18 ENCOUNTER — HOSPITAL ENCOUNTER (OUTPATIENT)
Dept: CARDIOLOGY | Facility: HOSPITAL | Age: 22
Discharge: HOME | End: 2022-02-18
Attending: INTERNAL MEDICINE
Payer: MEDICAID

## 2022-02-18 ENCOUNTER — OFFICE VISIT (OUTPATIENT)
Dept: TRANSPLANT | Facility: CLINIC | Age: 22
End: 2022-02-18
Payer: MEDICAID

## 2022-02-18 ENCOUNTER — SOCIAL WORK (OUTPATIENT)
Dept: TRANSPLANT | Facility: CLINIC | Age: 22
End: 2022-02-18

## 2022-02-18 VITALS
WEIGHT: 162.06 LBS | DIASTOLIC BLOOD PRESSURE: 76 MMHG | SYSTOLIC BLOOD PRESSURE: 132 MMHG | BODY MASS INDEX: 24 KG/M2 | HEART RATE: 127 BPM | HEIGHT: 69 IN

## 2022-02-18 VITALS
BODY MASS INDEX: 25.77 KG/M2 | HEIGHT: 70 IN | SYSTOLIC BLOOD PRESSURE: 122 MMHG | DIASTOLIC BLOOD PRESSURE: 88 MMHG | HEART RATE: 112 BPM | WEIGHT: 180 LBS

## 2022-02-18 DIAGNOSIS — I10 ESSENTIAL HYPERTENSION: ICD-10-CM

## 2022-02-18 DIAGNOSIS — Z79.899 IMMUNODEFICIENCY DUE TO TREATMENT WITH IMMUNOSUPPRESSIVE MEDICATION: ICD-10-CM

## 2022-02-18 DIAGNOSIS — D84.821 IMMUNODEFICIENCY DUE TO TREATMENT WITH IMMUNOSUPPRESSIVE MEDICATION: ICD-10-CM

## 2022-02-18 DIAGNOSIS — E78.2 MIXED HYPERLIPIDEMIA: ICD-10-CM

## 2022-02-18 DIAGNOSIS — Z94.1 STATUS POST HEART TRANSPLANT: ICD-10-CM

## 2022-02-18 DIAGNOSIS — T86.20 COMPLICATION OF HEART TRANSPLANT, UNSPECIFIED COMPLICATION: ICD-10-CM

## 2022-02-18 DIAGNOSIS — Z79.899 ENCOUNTER FOR LONG-TERM (CURRENT) USE OF MEDICATIONS: ICD-10-CM

## 2022-02-18 DIAGNOSIS — Z79.52 LONG TERM CURRENT USE OF SYSTEMIC STEROIDS: ICD-10-CM

## 2022-02-18 DIAGNOSIS — Z94.1 S/P ORTHOTOPIC HEART TRANSPLANT: Primary | ICD-10-CM

## 2022-02-18 LAB
ASCENDING AORTA: 2.41 CM
AV INDEX (PROSTH): 0.87
AV MEAN GRADIENT: 1 MMHG
AV PEAK GRADIENT: 2 MMHG
AV VALVE AREA: 2.74 CM2
AV VELOCITY RATIO: 0.94
BSA FOR ECHO PROCEDURE: 2.01 M2
CV ECHO LV RWT: 0.33 CM
DOP CALC AO PEAK VEL: 0.77 M/S
DOP CALC AO VTI: 12.29 CM
DOP CALC LVOT AREA: 3.1 CM2
DOP CALC LVOT DIAMETER: 2 CM
DOP CALC LVOT PEAK VEL: 0.72 M/S
DOP CALC LVOT STROKE VOLUME: 33.66 CM3
DOP CALCLVOT PEAK VEL VTI: 10.72 CM
E WAVE DECELERATION TIME: 106.83 MSEC
E/A RATIO: 2.27
E/E' RATIO: 4.17 M/S
ECHO LV POSTERIOR WALL: 0.65 CM (ref 0.6–1.1)
EJECTION FRACTION: 60 %
FRACTIONAL SHORTENING: 34 % (ref 28–44)
INTERVENTRICULAR SEPTUM: 0.63 CM (ref 0.6–1.1)
IVRT: 57.09 MSEC
LEFT INTERNAL DIMENSION IN SYSTOLE: 2.62 CM (ref 2.1–4)
LEFT VENTRICLE DIASTOLIC VOLUME INDEX: 34.55 ML/M2
LEFT VENTRICLE DIASTOLIC VOLUME: 69.1 ML
LEFT VENTRICLE MASS INDEX: 35 G/M2
LEFT VENTRICLE SYSTOLIC VOLUME INDEX: 12.5 ML/M2
LEFT VENTRICLE SYSTOLIC VOLUME: 25.08 ML
LEFT VENTRICULAR INTERNAL DIMENSION IN DIASTOLE: 3.98 CM (ref 3.5–6)
LEFT VENTRICULAR MASS: 69.23 G
LV LATERAL E/E' RATIO: 3.33 M/S
LV SEPTAL E/E' RATIO: 5.56 M/S
MV PEAK A VEL: 0.22 M/S
MV PEAK E VEL: 0.5 M/S
MV STENOSIS PRESSURE HALF TIME: 30.98 MS
MV VALVE AREA P 1/2 METHOD: 7.1 CM2
PISA TR MAX VEL: 1.98 M/S
RA PRESSURE: 3 MMHG
RIGHT VENTRICULAR END-DIASTOLIC DIMENSION: 3.5 CM
RV TISSUE DOPPLER FREE WALL SYSTOLIC VELOCITY 1 (APICAL 4 CHAMBER VIEW): 5.5 CM/S
SINUS: 2.98 CM
STJ: 2.49 CM
TDI LATERAL: 0.15 M/S
TDI SEPTAL: 0.09 M/S
TDI: 0.12 M/S
TR MAX PG: 16 MMHG
TRICUSPID ANNULAR PLANE SYSTOLIC EXCURSION: 0.53 CM
TV REST PULMONARY ARTERY PRESSURE: 19 MMHG

## 2022-02-18 PROCEDURE — 1159F PR MEDICATION LIST DOCUMENTED IN MEDICAL RECORD: ICD-10-PCS | Mod: CPTII,,, | Performed by: INTERNAL MEDICINE

## 2022-02-18 PROCEDURE — 93306 ECHO (CUPID ONLY): ICD-10-PCS | Mod: 26,,, | Performed by: INTERNAL MEDICINE

## 2022-02-18 PROCEDURE — 99215 PR OFFICE/OUTPT VISIT, EST, LEVL V, 40-54 MIN: ICD-10-PCS | Mod: S$PBB,,, | Performed by: INTERNAL MEDICINE

## 2022-02-18 PROCEDURE — 93306 TTE W/DOPPLER COMPLETE: CPT | Mod: 26,,, | Performed by: INTERNAL MEDICINE

## 2022-02-18 PROCEDURE — 99999 PR PBB SHADOW E&M-EST. PATIENT-LVL III: ICD-10-PCS | Mod: PBBFAC,,, | Performed by: INTERNAL MEDICINE

## 2022-02-18 PROCEDURE — 99215 OFFICE O/P EST HI 40 MIN: CPT | Mod: S$PBB,,, | Performed by: INTERNAL MEDICINE

## 2022-02-18 PROCEDURE — 3008F BODY MASS INDEX DOCD: CPT | Mod: CPTII,,, | Performed by: INTERNAL MEDICINE

## 2022-02-18 PROCEDURE — 3078F PR MOST RECENT DIASTOLIC BLOOD PRESSURE < 80 MM HG: ICD-10-PCS | Mod: CPTII,,, | Performed by: INTERNAL MEDICINE

## 2022-02-18 PROCEDURE — 1159F MED LIST DOCD IN RCRD: CPT | Mod: CPTII,,, | Performed by: INTERNAL MEDICINE

## 2022-02-18 PROCEDURE — 3078F DIAST BP <80 MM HG: CPT | Mod: CPTII,,, | Performed by: INTERNAL MEDICINE

## 2022-02-18 PROCEDURE — 1160F PR REVIEW ALL MEDS BY PRESCRIBER/CLIN PHARMACIST DOCUMENTED: ICD-10-PCS | Mod: CPTII,,, | Performed by: INTERNAL MEDICINE

## 2022-02-18 PROCEDURE — 3075F PR MOST RECENT SYSTOLIC BLOOD PRESS GE 130-139MM HG: ICD-10-PCS | Mod: CPTII,,, | Performed by: INTERNAL MEDICINE

## 2022-02-18 PROCEDURE — 99213 OFFICE O/P EST LOW 20 MIN: CPT | Mod: PBBFAC,25 | Performed by: INTERNAL MEDICINE

## 2022-02-18 PROCEDURE — 1160F RVW MEDS BY RX/DR IN RCRD: CPT | Mod: CPTII,,, | Performed by: INTERNAL MEDICINE

## 2022-02-18 PROCEDURE — 93306 TTE W/DOPPLER COMPLETE: CPT

## 2022-02-18 PROCEDURE — 99999 PR PBB SHADOW E&M-EST. PATIENT-LVL III: CPT | Mod: PBBFAC,,, | Performed by: INTERNAL MEDICINE

## 2022-02-18 PROCEDURE — 3008F PR BODY MASS INDEX (BMI) DOCUMENTED: ICD-10-PCS | Mod: CPTII,,, | Performed by: INTERNAL MEDICINE

## 2022-02-18 PROCEDURE — 3075F SYST BP GE 130 - 139MM HG: CPT | Mod: CPTII,,, | Performed by: INTERNAL MEDICINE

## 2022-02-18 RX ORDER — TACROLIMUS 1 MG/1
1 CAPSULE ORAL EVERY 12 HOURS
Qty: 180 CAPSULE | Refills: 11 | Status: SHIPPED | OUTPATIENT
Start: 2022-02-18 | End: 2022-02-21

## 2022-02-18 NOTE — PROGRESS NOTES
Subjective:   Mr. Aponte is a 21 y.o. year old White male who received a  heart transplant on 9/29/14.      CMV status: Unknown      HPI  Mr. 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 Had a fistula back in July that was managed by Gen Surgery did I&D of the abscess, and the fistula was closed by AES during EGD. No changes were made to immunosuppression regimen. Blood cxs show NGTD. Abscess culture with GNR on gram stain, but NGTD. IV antibiotics were then de escalated to a 7 day course of Augmentin. Has done well since. Had  recent biopsy which was negative. Comes today for a follow-up visit. Doing well. Has no complaints. Echo done today showed preserved graft function. Immuno regimen includes Prograf sublingual 2/1 prednisone 7.5 mg and cellcept 1500 mg bid.     2D echo with CFD done today  · Post cardiac transplantation study (OHTx 9/29/2014)  · The left ventricle is normal in size with normal systolic function. The estimated ejection fraction is 60%.  · Normal right ventricular size with normal right ventricular systolic function.  · Normal left ventricular diastolic function.  · The estimated PA systolic pressure is 19 mmHg.  · Normal central venous pressure (3 mmHg).     Review of Systems   Constitutional: Negative. Negative for chills, decreased appetite, diaphoresis, fever, malaise/fatigue, night sweats,  "weight gain and weight loss.   Eyes: Negative.    Cardiovascular: Negative for chest pain, claudication, cyanosis, dyspnea on exertion, irregular heartbeat, leg swelling, near-syncope, orthopnea, palpitations, paroxysmal nocturnal dyspnea and syncope.   Respiratory: Negative for cough, hemoptysis and shortness of breath.    Endocrine: Negative.    Hematologic/Lymphatic: Negative.    Skin: Negative for color change, dry skin and nail changes.   Musculoskeletal: Negative.    Gastrointestinal: Negative.    Genitourinary: Negative.    Neurological: Negative for weakness.       Objective:   Blood pressure 132/76, pulse (!) 127, height 5' 9" (1.753 m), weight 73.5 kg (162 lb 0.6 oz).body mass index is 23.93 kg/m².    Physical Exam  Vitals reviewed.   Constitutional:       Appearance: He is well-developed.      Comments: /76 (BP Location: Left arm, Patient Position: Sitting, BP Method: Medium (Automatic))   Pulse (!) 127   Ht 5' 9" (1.753 m)   Wt 73.5 kg (162 lb 0.6 oz)   BMI 23.93 kg/m²      HENT:      Head: Normocephalic.   Neck:      Vascular: No carotid bruit or JVD.   Cardiovascular:      Rate and Rhythm: Regular rhythm.      Pulses: Normal pulses.      Heart sounds: Normal heart sounds. No murmur heard.      Pulmonary:      Effort: Pulmonary effort is normal.      Breath sounds: Normal breath sounds.   Abdominal:      General: Bowel sounds are normal.      Palpations: Abdomen is soft.   Skin:     General: Skin is warm.   Neurological:      Mental Status: He is alert.         Lab Results   Component Value Date    WBC 9.47 02/18/2022    HGB 17.2 02/18/2022    HCT 51.2 02/18/2022    MCV 81 (L) 02/18/2022     02/18/2022    CO2 25 02/18/2022    CREATININE 0.9 02/18/2022    CALCIUM 8.9 02/18/2022    ALBUMIN 4.3 02/18/2022    AST 20 02/18/2022    BNP 26 02/18/2022    ALT 16 02/18/2022       Lab Results   Component Value Date    INR 1.1 06/17/2021       BNP   Date Value Ref Range Status   02/18/2022 26 0 - " 99 pg/mL Final     Comment:     Values of less than 100 pg/ml are consistent with non-CHF populations.   12/03/2021 41 0 - 99 pg/mL Final     Comment:     Values of less than 100 pg/ml are consistent with non-CHF populations.   10/26/2021 44 0 - 99 pg/mL Final     Comment:     Values of less than 100 pg/ml are consistent with non-CHF populations.         Tacrolimus Lvl   Date Value Ref Range Status   12/03/2021 16.8 (H) 5.0 - 15.0 ng/mL Final     Comment:     Testing performed by a chemiluminescent microparticle   immunoassay on the [employer] System.         Assessment:     1. S/P orthotopic heart transplant    2. Immunodeficiency due to treatment with immunosuppressive medication    3. Essential hypertension    4. Mixed hyperlipidemia        Plan:   Doing well  Continue current immuno regimen   Decrease prednisone to 5 mg daily    Return instructions as set forth by post transplant schedule or as needed:    Clinic: Return for labs and/or biopsy weekly the first month, every two weeks during month 2 and then monthly for the first year at the provider or coordinator's discretion. During the second year, return to clinic every 3 months. Post transplant year 3-5 return every 6 months. There will be a comprehensive post transplant evaluation every year that may include LHC/RHC/biopsy, stress test, echo, CXR, and other health screening exams.    In addition to the clinical assessment, I have ordered Allomap testing for this patient to assist in identification of moderate/severe acute cellular rejection (ACR) in a pt with stable Allograft function instead of endomyocardial biopsy.     Patient is reminded to call with any health changes as these can be early signs of transplant complications. Patient is advised to make sure any new medications or changes of old medications are discussed with a pharmacist or physician knowledgeable with transplant to avoid rejection/drug toxicity related to significant drug  interactions.    Patient advised that it is recommended that all transplanted patients, and their close contacts and household members receive Covid vaccination.    UNOS Patient Status  Functional Status: 100% - Normal, no complaints, no evidence of disease  Physical Capacity: No Limitations  Working for Income: Unknown    Kirsty Ward MD

## 2022-02-18 NOTE — PROGRESS NOTES
Pt presents in clinic with pt's mother. Pt and mother aaox4 with pleasant affects.    Pt and mother state they are still waiting for Disability.  [facility]  who specializes in Disability appeals. Mother states she will do so.    Pt reports he saw a psychiatrist but does not want to go on medications, mainly due to his difficulty swallowing pills. Pt is being referred to a counselor from the same practice. Pt will pay out of pocket until that provider can accept his insurance. Pt and mother report the out of pocket is doable for them. Pt reports the psychiatrist had some ideas for work pt could do on computer from home which pt will look into.    Pt reports he still mainly keeps to himself and plays video games in his room. Pt reports no current friends to hang out with in person. Pt reports when he returned to school after transplant his friend group had moved on.    Pt reports he does feel hopeful about the future and is looking forward to working with his therapist.    Active listening, support, education and encouragement provided. SW remains available.    Requested pt be placed on SW schedule for next visit for follow-up.

## 2022-02-18 NOTE — TELEPHONE ENCOUNTER
Met with pt in clinic this morning, reviewed medication, no blue card or list provided by pt's mother, reviewed , Handed pt;'s mother BP record log sheets for 2 months and reviewed heart rate.     Pt Denies SOB, Fever, N/V.

## 2022-02-22 ENCOUNTER — PATIENT MESSAGE (OUTPATIENT)
Dept: TRANSPLANT | Facility: CLINIC | Age: 22
End: 2022-02-22
Payer: MEDICAID

## 2022-05-26 ENCOUNTER — PATIENT MESSAGE (OUTPATIENT)
Dept: TRANSPLANT | Facility: CLINIC | Age: 22
End: 2022-05-26
Payer: MEDICAID

## 2022-05-27 ENCOUNTER — TELEPHONE (OUTPATIENT)
Dept: TRANSPLANT | Facility: CLINIC | Age: 22
End: 2022-05-27
Payer: MEDICAID

## 2022-05-30 NOTE — TELEPHONE ENCOUNTER
Appt canceled due to PA not approved for his Echo, Insurance is requiring him to have a Peer to Peer for his echo.

## 2022-06-15 ENCOUNTER — PATIENT MESSAGE (OUTPATIENT)
Dept: TRANSPLANT | Facility: CLINIC | Age: 22
End: 2022-06-15
Payer: MEDICAID

## 2022-06-23 ENCOUNTER — TELEPHONE (OUTPATIENT)
Dept: TRANSPLANT | Facility: CLINIC | Age: 22
End: 2022-06-23
Payer: MEDICAID

## 2022-06-27 DIAGNOSIS — Z94.1 STATUS POST HEART TRANSPLANT: Primary | ICD-10-CM

## 2022-06-28 ENCOUNTER — OFFICE VISIT (OUTPATIENT)
Dept: TRANSPLANT | Facility: CLINIC | Age: 22
End: 2022-06-28
Payer: MEDICAID

## 2022-06-28 ENCOUNTER — TELEPHONE (OUTPATIENT)
Dept: TRANSPLANT | Facility: CLINIC | Age: 22
End: 2022-06-28
Payer: MEDICAID

## 2022-06-28 ENCOUNTER — HOSPITAL ENCOUNTER (OUTPATIENT)
Dept: CARDIOLOGY | Facility: HOSPITAL | Age: 22
Discharge: HOME | End: 2022-06-28
Attending: INTERNAL MEDICINE
Payer: MEDICAID

## 2022-06-28 VITALS
SYSTOLIC BLOOD PRESSURE: 128 MMHG | HEIGHT: 69 IN | DIASTOLIC BLOOD PRESSURE: 76 MMHG | HEART RATE: 129 BPM | WEIGHT: 156.75 LBS | BODY MASS INDEX: 23.22 KG/M2

## 2022-06-28 VITALS
BODY MASS INDEX: 23.99 KG/M2 | DIASTOLIC BLOOD PRESSURE: 81 MMHG | WEIGHT: 162 LBS | HEART RATE: 115 BPM | HEIGHT: 69 IN | SYSTOLIC BLOOD PRESSURE: 135 MMHG

## 2022-06-28 DIAGNOSIS — T86.20 COMPLICATION OF HEART TRANSPLANT, UNSPECIFIED COMPLICATION: ICD-10-CM

## 2022-06-28 DIAGNOSIS — I10 ESSENTIAL HYPERTENSION: ICD-10-CM

## 2022-06-28 DIAGNOSIS — D84.821 IMMUNOSUPPRESSION DUE TO DRUG THERAPY: ICD-10-CM

## 2022-06-28 DIAGNOSIS — Z94.1 S/P ORTHOTOPIC HEART TRANSPLANT: Primary | ICD-10-CM

## 2022-06-28 DIAGNOSIS — Z79.52 LONG TERM CURRENT USE OF SYSTEMIC STEROIDS: ICD-10-CM

## 2022-06-28 DIAGNOSIS — Z94.1 STATUS POST HEART TRANSPLANT: ICD-10-CM

## 2022-06-28 DIAGNOSIS — Z79.899 IMMUNOSUPPRESSION DUE TO DRUG THERAPY: ICD-10-CM

## 2022-06-28 LAB
ASCENDING AORTA: 2.36 CM
AV INDEX (PROSTH): 0.91
AV MEAN GRADIENT: 2 MMHG
AV PEAK GRADIENT: 4 MMHG
AV VALVE AREA: 3.68 CM2
AV VELOCITY RATIO: 0.91
BSA FOR ECHO PROCEDURE: 1.89 M2
CV ECHO LV RWT: 0.39 CM
DOP CALC AO PEAK VEL: 0.98 M/S
DOP CALC AO VTI: 14.77 CM
DOP CALC LVOT AREA: 4 CM2
DOP CALC LVOT DIAMETER: 2.27 CM
DOP CALC LVOT PEAK VEL: 0.89 M/S
DOP CALC LVOT STROKE VOLUME: 54.32 CM3
DOP CALCLVOT PEAK VEL VTI: 13.43 CM
E WAVE DECELERATION TIME: 98.23 MSEC
E/A RATIO: 2.87
E/E' RATIO: 4.89 M/S
ECHO LV POSTERIOR WALL: 0.74 CM (ref 0.6–1.1)
EJECTION FRACTION: 63 %
FRACTIONAL SHORTENING: 32 % (ref 28–44)
INTERVENTRICULAR SEPTUM: 0.76 CM (ref 0.6–1.1)
IVRT: 36.16 MSEC
LEFT INTERNAL DIMENSION IN SYSTOLE: 2.62 CM (ref 2.1–4)
LEFT VENTRICLE DIASTOLIC VOLUME INDEX: 33.65 ML/M2
LEFT VENTRICLE DIASTOLIC VOLUME: 63.6 ML
LEFT VENTRICLE MASS INDEX: 42 G/M2
LEFT VENTRICLE SYSTOLIC VOLUME INDEX: 13.2 ML/M2
LEFT VENTRICLE SYSTOLIC VOLUME: 25.04 ML
LEFT VENTRICULAR INTERNAL DIMENSION IN DIASTOLE: 3.84 CM (ref 3.5–6)
LEFT VENTRICULAR MASS: 80.18 G
LV LATERAL E/E' RATIO: 3.88 M/S
LV SEPTAL E/E' RATIO: 6.6 M/S
MV PEAK A VEL: 0.23 M/S
MV PEAK E VEL: 0.66 M/S
MV STENOSIS PRESSURE HALF TIME: 28.49 MS
MV VALVE AREA P 1/2 METHOD: 7.72 CM2
PISA TR MAX VEL: 2.26 M/S
RA PRESSURE: 3 MMHG
RIGHT VENTRICULAR END-DIASTOLIC DIMENSION: 3.62 CM
RV TISSUE DOPPLER FREE WALL SYSTOLIC VELOCITY 1 (APICAL 4 CHAMBER VIEW): 7.48 CM/S
SINUS: 3.03 CM
STJ: 2.55 CM
TDI LATERAL: 0.17 M/S
TDI SEPTAL: 0.1 M/S
TDI: 0.14 M/S
TR MAX PG: 20 MMHG
TRICUSPID ANNULAR PLANE SYSTOLIC EXCURSION: 0.59 CM
TV REST PULMONARY ARTERY PRESSURE: 23 MMHG

## 2022-06-28 PROCEDURE — 93306 TTE W/DOPPLER COMPLETE: CPT

## 2022-06-28 PROCEDURE — 3078F DIAST BP <80 MM HG: CPT | Mod: CPTII,,, | Performed by: INTERNAL MEDICINE

## 2022-06-28 PROCEDURE — 3078F PR MOST RECENT DIASTOLIC BLOOD PRESSURE < 80 MM HG: ICD-10-PCS | Mod: CPTII,,, | Performed by: INTERNAL MEDICINE

## 2022-06-28 PROCEDURE — 99999 PR PBB SHADOW E&M-EST. PATIENT-LVL III: ICD-10-PCS | Mod: PBBFAC,,, | Performed by: INTERNAL MEDICINE

## 2022-06-28 PROCEDURE — 3008F BODY MASS INDEX DOCD: CPT | Mod: CPTII,,, | Performed by: INTERNAL MEDICINE

## 2022-06-28 PROCEDURE — 99214 PR OFFICE/OUTPT VISIT, EST, LEVL IV, 30-39 MIN: ICD-10-PCS | Mod: S$PBB,,, | Performed by: INTERNAL MEDICINE

## 2022-06-28 PROCEDURE — 1160F PR REVIEW ALL MEDS BY PRESCRIBER/CLIN PHARMACIST DOCUMENTED: ICD-10-PCS | Mod: CPTII,,, | Performed by: INTERNAL MEDICINE

## 2022-06-28 PROCEDURE — 3074F SYST BP LT 130 MM HG: CPT | Mod: CPTII,,, | Performed by: INTERNAL MEDICINE

## 2022-06-28 PROCEDURE — 99214 OFFICE O/P EST MOD 30 MIN: CPT | Mod: S$PBB,,, | Performed by: INTERNAL MEDICINE

## 2022-06-28 PROCEDURE — 1159F PR MEDICATION LIST DOCUMENTED IN MEDICAL RECORD: ICD-10-PCS | Mod: CPTII,,, | Performed by: INTERNAL MEDICINE

## 2022-06-28 PROCEDURE — 99213 OFFICE O/P EST LOW 20 MIN: CPT | Mod: PBBFAC,25 | Performed by: INTERNAL MEDICINE

## 2022-06-28 PROCEDURE — 93306 TTE W/DOPPLER COMPLETE: CPT | Mod: 26,,, | Performed by: INTERNAL MEDICINE

## 2022-06-28 PROCEDURE — 3074F PR MOST RECENT SYSTOLIC BLOOD PRESSURE < 130 MM HG: ICD-10-PCS | Mod: CPTII,,, | Performed by: INTERNAL MEDICINE

## 2022-06-28 PROCEDURE — 93306 ECHO (CUPID ONLY): ICD-10-PCS | Mod: 26,,, | Performed by: INTERNAL MEDICINE

## 2022-06-28 PROCEDURE — 1159F MED LIST DOCD IN RCRD: CPT | Mod: CPTII,,, | Performed by: INTERNAL MEDICINE

## 2022-06-28 PROCEDURE — 99999 PR PBB SHADOW E&M-EST. PATIENT-LVL III: CPT | Mod: PBBFAC,,, | Performed by: INTERNAL MEDICINE

## 2022-06-28 PROCEDURE — 3008F PR BODY MASS INDEX (BMI) DOCUMENTED: ICD-10-PCS | Mod: CPTII,,, | Performed by: INTERNAL MEDICINE

## 2022-06-28 PROCEDURE — 1160F RVW MEDS BY RX/DR IN RCRD: CPT | Mod: CPTII,,, | Performed by: INTERNAL MEDICINE

## 2022-06-28 NOTE — TELEPHONE ENCOUNTER
Met with pt and mom in clinic. ECHO was not authorized today. Dr. Diaz spent time on the phone with peer to peer and appeal for ECHO. ECHO was performed without authorization due to ECHO tech checking pt in early. ECHO 63%. Pt reported taking tac different than med list. Pt taking 1/2, goal 8-12. Level was 15.6, Dr Diaz decreased tac to 1/1 with repeat labs next week. Lab slip sent to pt's mother.   Dr. Diaz ordered repeat labs in 1 month and 3 months with pushing his annual to December 2022.   Spoke to pt about working and finding a job where he can sit but still communicate with people. He will look into new job opportunities.

## 2022-06-30 ENCOUNTER — TELEPHONE (OUTPATIENT)
Dept: TRANSPLANT | Facility: CLINIC | Age: 22
End: 2022-06-30
Payer: MEDICAID

## 2022-06-30 NOTE — TELEPHONE ENCOUNTER
Called pt's mother to explain normal heart care kit results. Will use this going forward with his labs. Explained to pt's mother that we can find a place close to him that performs the test or have a person go to the house to draw. Will discuss in 3 months.

## 2022-07-07 ENCOUNTER — LAB VISIT (OUTPATIENT)
Dept: LAB | Facility: HOSPITAL | Age: 22
End: 2022-07-07
Attending: INTERNAL MEDICINE
Payer: MEDICAID

## 2022-07-07 DIAGNOSIS — N28.9 RENAL INSUFFICIENCY: ICD-10-CM

## 2022-07-07 DIAGNOSIS — I15.9 HYPERTENSION, SECONDARY: ICD-10-CM

## 2022-07-07 DIAGNOSIS — E03.9 HYPOTHYROIDISM, UNSPECIFIED TYPE: ICD-10-CM

## 2022-07-07 DIAGNOSIS — B25.9 CYTOMEGALOVIRUS INFECTION, UNSPECIFIED CYTOMEGALOVIRAL INFECTION TYPE: ICD-10-CM

## 2022-07-07 DIAGNOSIS — R10.9 ACUTE ABDOMINAL PAIN: ICD-10-CM

## 2022-07-07 DIAGNOSIS — Z94.1 HEART TRANSPLANTED: Primary | ICD-10-CM

## 2022-07-07 DIAGNOSIS — E78.2 MIXED HYPERLIPIDEMIA: ICD-10-CM

## 2022-07-07 DIAGNOSIS — D89.9: ICD-10-CM

## 2022-07-07 LAB
ALBUMIN SERPL-MCNC: 4.5 GM/DL (ref 3.5–5)
ALBUMIN/GLOB SERPL: 1.6 RATIO (ref 1.1–2)
ALP SERPL-CCNC: 116 UNIT/L (ref 40–150)
ALT SERPL-CCNC: 18 UNIT/L (ref 0–55)
AST SERPL-CCNC: 17 UNIT/L (ref 5–34)
BASOPHILS # BLD AUTO: 0.03 X10(3)/MCL (ref 0–0.2)
BASOPHILS NFR BLD AUTO: 0.3 %
BILIRUBIN DIRECT+TOT PNL SERPL-MCNC: 0.8 MG/DL
BNP BLD-MCNC: 32 PG/ML
BUN SERPL-MCNC: 9.3 MG/DL (ref 8.9–20.6)
CALCIUM SERPL-MCNC: 9.5 MG/DL (ref 8.4–10.2)
CHLORIDE SERPL-SCNC: 104 MMOL/L (ref 98–107)
CO2 SERPL-SCNC: 27 MMOL/L (ref 22–29)
CREAT SERPL-MCNC: 0.89 MG/DL (ref 0.73–1.18)
EOSINOPHIL # BLD AUTO: 0.39 X10(3)/MCL (ref 0–0.9)
EOSINOPHIL NFR BLD AUTO: 3.8 %
ERYTHROCYTE [DISTWIDTH] IN BLOOD BY AUTOMATED COUNT: 13.1 % (ref 11.5–17)
GLOBULIN SER-MCNC: 2.9 GM/DL (ref 2.4–3.5)
GLUCOSE SERPL-MCNC: 92 MG/DL (ref 74–100)
HCT VFR BLD AUTO: 51.3 % (ref 42–52)
HGB BLD-MCNC: 17.2 GM/DL (ref 14–18)
IMM GRANULOCYTES # BLD AUTO: 0.05 X10(3)/MCL (ref 0–0.04)
IMM GRANULOCYTES NFR BLD AUTO: 0.5 %
LYMPHOCYTES # BLD AUTO: 2.6 X10(3)/MCL (ref 0.6–4.6)
LYMPHOCYTES NFR BLD AUTO: 25.6 %
MCH RBC QN AUTO: 27.5 PG (ref 27–31)
MCHC RBC AUTO-ENTMCNC: 33.5 MG/DL (ref 33–36)
MCV RBC AUTO: 81.9 FL (ref 80–94)
MONOCYTES # BLD AUTO: 0.72 X10(3)/MCL (ref 0.1–1.3)
MONOCYTES NFR BLD AUTO: 7.1 %
NEUTROPHILS # BLD AUTO: 6.4 X10(3)/MCL (ref 2.1–9.2)
NEUTROPHILS NFR BLD AUTO: 62.7 %
NRBC BLD AUTO-RTO: 0 %
PLATELET # BLD AUTO: 229 X10(3)/MCL (ref 130–400)
PMV BLD AUTO: 10.9 FL (ref 7.4–10.4)
POTASSIUM SERPL-SCNC: 3.7 MMOL/L (ref 3.5–5.1)
PROT SERPL-MCNC: 7.4 GM/DL (ref 6.4–8.3)
RBC # BLD AUTO: 6.26 X10(6)/MCL (ref 4.7–6.1)
SODIUM SERPL-SCNC: 142 MMOL/L (ref 136–145)
WBC # SPEC AUTO: 10.2 X10(3)/MCL (ref 4.5–11.5)

## 2022-07-07 PROCEDURE — 85025 COMPLETE CBC W/AUTO DIFF WBC: CPT

## 2022-07-07 PROCEDURE — 36415 COLL VENOUS BLD VENIPUNCTURE: CPT

## 2022-07-07 PROCEDURE — 83880 ASSAY OF NATRIURETIC PEPTIDE: CPT

## 2022-07-07 PROCEDURE — 80197 ASSAY OF TACROLIMUS: CPT

## 2022-07-07 PROCEDURE — 80053 COMPREHEN METABOLIC PANEL: CPT

## 2022-07-08 LAB — TACROLIMUS TROUGH BLD-MCNC: 9.4 NG/ML

## 2022-07-15 ENCOUNTER — PATIENT MESSAGE (OUTPATIENT)
Dept: TRANSPLANT | Facility: CLINIC | Age: 22
End: 2022-07-15
Payer: MEDICAID

## 2022-08-24 NOTE — TELEPHONE ENCOUNTER
Scheduled appointment with Dr. Renae for Oct 8 (echo) and Oct 12 (visit/ekg). Appointment slip mailed to confirmed address on file. Mom verbalized understanding all information provided    56F PMHx HTN, DM2, CVA 2017 here with RLE wound. HTN urgency. Acute embolic CVA.     IMP  #Acute CVA    mri with multiple cortical infarcts, suspected embolic; no hemorrhage    c/b dysphagia, on tf via ngt  #HTN urgency  #DM2  #RLE cellulitis    s/p course abx    s/p debridement 8/10  #Interictal activity on eeg    PLAN  plan for peg today.   cont asa, plavix on hold  lipitor 80  s/p ILR  keppra 1500 bid  phenytoin 100 tid  BP better controlled

## 2022-09-10 PROBLEM — D84.821 IMMUNOSUPPRESSION DUE TO DRUG THERAPY: Status: ACTIVE | Noted: 2022-09-10

## 2022-09-10 PROBLEM — Z79.899 IMMUNOSUPPRESSION DUE TO DRUG THERAPY: Status: ACTIVE | Noted: 2022-09-10

## 2022-09-11 NOTE — PROGRESS NOTES
Subjective:   Mr. Aponte is a 22 y.o. year old White male who received a  heart transplant on 9/29/14.      CMV status: Unknown      HPI  Mr. 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 Had a fistula back in July of 2021 that was managed by [facility] Surgery did I&D of the abscess, and the fistula was closed by AES during EGD. No changes were made to immunosuppression regimen. Blood cxs show NGTD. Abscess culture with GNR on gram stain, but NGTD. IV antibiotics were then de escalated to a 7 day course of Augmentin. Has done well since. Had  recent biopsy which was negative.     Patient here for routine followup, doing well currently, now 7 and 1/2 years post OHT. Patient denies N/V/F/C, lightheadedness, dizziness, PND, orthopnea, LE edema, abdominal pain, abdominal pressure, chest pain, chest pressure, syncope, pre-syncope.  Immuno regimen includes Prograf sublingual 2/1 prednisone 5 mg and cellcept 1500 mg bid.     TTE 06/28/22:  The left ventricle is normal in size with normal systolic function.  The estimated ejection fraction is 63%.  There is abnormal septal wall motion.  Normal left ventricular diastolic function.  Normal right ventricular size with low normal right ventricular systolic function.  Normal central venous pressure (3 mmHg).  The estimated PA systolic pressure is 23 mmHg.       Review of Systems  "  Constitutional: Negative. Negative for chills, decreased appetite, diaphoresis, fever, malaise/fatigue, night sweats, weight gain and weight loss.   Eyes: Negative.    Cardiovascular:  Negative for chest pain, claudication, cyanosis, dyspnea on exertion, irregular heartbeat, leg swelling, near-syncope, orthopnea, palpitations, paroxysmal nocturnal dyspnea and syncope.   Respiratory:  Negative for cough, hemoptysis and shortness of breath.    Endocrine: Negative.    Hematologic/Lymphatic: Negative.    Skin:  Negative for color change, dry skin and nail changes.   Musculoskeletal: Negative.    Gastrointestinal: Negative.    Genitourinary: Negative.    Neurological:  Negative for weakness.     Objective:   Blood pressure 128/76, pulse (!) 129, height 5' 9" (1.753 m), weight 71.1 kg (156 lb 12 oz).body mass index is 23.15 kg/m².    Physical Exam  Vitals reviewed.   Constitutional:       Appearance: He is well-developed.      Comments:      HENT:      Head: Normocephalic.   Neck:      Vascular: No carotid bruit or JVD.   Cardiovascular:      Rate and Rhythm: Regular rhythm.      Pulses: Normal pulses.      Heart sounds: Normal heart sounds. No murmur heard.  Pulmonary:      Effort: Pulmonary effort is normal.      Breath sounds: Normal breath sounds.   Abdominal:      General: Bowel sounds are normal.      Palpations: Abdomen is soft.   Skin:     General: Skin is warm.   Neurological:      Mental Status: He is alert.       Lab Results   Component Value Date    WBC 10.2 07/07/2022    HGB 17.2 07/07/2022    HCT 51.3 07/07/2022    MCV 81.9 07/07/2022     07/07/2022    CHLORIDE 104 07/07/2022    CO2 27 07/07/2022    CREATININE 0.89 07/07/2022    GLUCOSE 92 07/07/2022    CALCIUM 9.5 07/07/2022    ALBUMIN 4.5 07/07/2022    AST 17 07/07/2022    BNP 32.0 07/07/2022    ALT 18 07/07/2022       Lab Results   Component Value Date    INR 1.1 06/17/2021       BNP   Date Value Ref Range Status   06/28/2022 35 0 - 99 pg/mL " Final     Comment:     Values of less than 100 pg/ml are consistent with non-CHF populations.   02/18/2022 26 0 - 99 pg/mL Final     Comment:     Values of less than 100 pg/ml are consistent with non-CHF populations.   12/03/2021 41 0 - 99 pg/mL Final     Comment:     Values of less than 100 pg/ml are consistent with non-CHF populations.     Natriuretic Peptide   Date Value Ref Range Status   07/07/2022 32.0 <=100.0 pg/mL Final   12/15/2021 19.4 <<=100.0 pg/mL Final   12/08/2021 18.8 <<=100.0 pg/mL Final         Tacrolimus Lvl   Date Value Ref Range Status   06/28/2022 15.6 (H) 5.0 - 15.0 ng/mL Final     Comment:     Testing performed by a chemiluminescent microparticle   immunoassay on the [employer] System.         Assessment:     1. S/P orthotopic heart transplant    2. Essential hypertension    3. Immunosuppression due to drug therapy        Plan:   Doing well  Continue current immuno regimen, followup on regimen and see how results resume.     Return instructions as set forth by post transplant schedule or as needed:    Clinic: Return for labs and/or biopsy weekly the first month, every two weeks during month 2 and then monthly for the first year at the provider or coordinator's discretion. During the second year, return to clinic every 3 months. Post transplant year 3-5 return every 6 months. There will be a comprehensive post transplant evaluation every year that may include LHC/RHC/biopsy, stress test, echo, CXR, and other health screening exams.    In addition to the clinical assessment, I have ordered Allomap testing for this patient to assist in identification of moderate/severe acute cellular rejection (ACR) in a pt with stable Allograft function instead of endomyocardial biopsy.     Patient is reminded to call with any health changes as these can be early signs of transplant complications. Patient is advised to make sure any new medications or changes of old medications are discussed with a  pharmacist or physician knowledgeable with transplant to avoid rejection/drug toxicity related to significant drug interactions.    Patient advised that it is recommended that all transplanted patients, and their close contacts and household members receive Covid vaccination.    UNOS Patient Status  Functional Status: 100% - Normal, no complaints, no evidence of disease  Physical Capacity: No Limitations  Working for Income: Unknown    Junie Diaz MD

## 2022-09-30 ENCOUNTER — TELEPHONE (OUTPATIENT)
Dept: TRANSPLANT | Facility: CLINIC | Age: 22
End: 2022-09-30
Payer: MEDICAID

## 2023-02-14 NOTE — TELEPHONE ENCOUNTER
Made appointment for Nov 11th, 2019 with start time 9am. Faxed orders the [facility] ( [phone]). Appointment slip mailed out to the address on file. Wilmer will need to get lab work done 1 week before his appointment.    Was A Bandage Applied: Yes Wound Care: Petrolatum Cryotherapy Text: The wound bed was treated with cryotherapy after the biopsy was performed. Biopsy Type: H and E X Size Of Lesion In Cm: 0 Render Post-Care Instructions In Note?: no Type Of Destruction Used: Curettage Curettage Text: The wound bed was treated with curettage after the biopsy was performed. Notification Instructions: Patient will be notified of biopsy results. However, patient instructed to call the office if not contacted within 2 weeks. Depth Of Biopsy: dermis Hemostasis: Aluminum Chloride Information: Selecting Yes will display possible errors in your note based on the variables you have selected. This validation is only offered as a suggestion for you. PLEASE NOTE THAT THE VALIDATION TEXT WILL BE REMOVED WHEN YOU FINALIZE YOUR NOTE. IF YOU WANT TO FAX A PRELIMINARY NOTE YOU WILL NEED TO TOGGLE THIS TO 'NO' IF YOU DO NOT WANT IT IN YOUR FAXED NOTE. Billing Type: Third-Party Bill Silver Nitrate Text: The wound bed was treated with silver nitrate after the biopsy was performed. Consent: Written consent was obtained and risks were reviewed including but not limited to scarring, infection, bleeding, scabbing, incomplete removal, nerve damage and allergy to anesthesia. Detail Level: Detailed Size Of Lesion In Cm: 1.2 Biopsy Method: Personna blade Anesthesia Type: 0.5% lidocaine with 1:100,000 epinephrine and a 1:10 solution of 8.4% sodium bicarbonate Dressing: bandage Post-Care Instructions: I reviewed with the patient in detail post-care instructions. Patient is to keep the biopsy site dry overnight, and then apply bacitracin twice daily until healed. Patient may apply hydrogen peroxide soaks to remove any crusting. Electrodesiccation And Curettage Text: The wound bed was treated with electrodesiccation and curettage after the biopsy was performed. Anesthesia Volume In Cc: 0.5 Electrodesiccation Text: The wound bed was treated with electrodesiccation after the biopsy was performed.

## 2023-05-16 ENCOUNTER — TELEPHONE (OUTPATIENT)
Dept: TRANSPLANT | Facility: CLINIC | Age: 23
End: 2023-05-16
Payer: MEDICAID

## 2023-05-30 NOTE — LETTER
November 11, 2019      Ugo Pratt MD  [address] [place] [address]           [facility]  [address]  [place] [address]  Phone: [phone]  Fax: [phone]          Patient: Wilmer Aponte   MR Number: [id number]   YOB: 2000   Date of Visit: 11/11/2019       Dear Dr. Ugo Pratt:    Thank you for referring Wilmer Aponte to me for evaluation. Attached you will find relevant portions of my assessment and plan of care.    If you have questions, please do not hesitate to call me. I look forward to following Wilmer Aponte along with you.    Sincerely,    Morteza Renae MD    Enclosure  CC:  No Recipients    If you would like to receive this communication electronically, please contact [email or web address] or [phone] to request more information on [employer] access.    For providers and/or their staff who would like to refer a patient to [facility], please contact us through our one-stop-shop provider referral line, [facility], at [phone].    If you feel you have received this communication in error or would no longer like to receive these types of communications, please e-mail [email or web address]         
Statement Selected

## 2023-06-05 ENCOUNTER — PATIENT MESSAGE (OUTPATIENT)
Dept: TRANSPLANT | Facility: CLINIC | Age: 23
End: 2023-06-05
Payer: MEDICAID

## 2023-06-05 DIAGNOSIS — Z79.52 LONG TERM CURRENT USE OF SYSTEMIC STEROIDS: ICD-10-CM

## 2023-06-05 DIAGNOSIS — Z79.899 ENCOUNTER FOR LONG-TERM (CURRENT) USE OF MEDICATIONS: ICD-10-CM

## 2023-06-05 DIAGNOSIS — Z94.1 STATUS POST HEART TRANSPLANT: ICD-10-CM

## 2023-06-05 DIAGNOSIS — R06.02 SHORTNESS OF BREATH: ICD-10-CM

## 2023-06-05 DIAGNOSIS — T86.20 COMPLICATION OF HEART TRANSPLANT, UNSPECIFIED COMPLICATION: Primary | ICD-10-CM

## 2023-06-13 ENCOUNTER — PATIENT MESSAGE (OUTPATIENT)
Dept: TRANSPLANT | Facility: CLINIC | Age: 23
End: 2023-06-13
Payer: MEDICAID

## 2023-06-13 DIAGNOSIS — Z79.899 ENCOUNTER FOR LONG-TERM (CURRENT) USE OF MEDICATIONS: ICD-10-CM

## 2023-06-13 DIAGNOSIS — T86.20 COMPLICATION OF HEART TRANSPLANT, UNSPECIFIED COMPLICATION: Primary | ICD-10-CM

## 2023-06-13 DIAGNOSIS — Z94.1 STATUS POST HEART TRANSPLANT: ICD-10-CM

## 2023-06-13 DIAGNOSIS — E78.2 MIXED HYPERLIPIDEMIA: ICD-10-CM

## 2023-06-19 ENCOUNTER — LAB VISIT (OUTPATIENT)
Dept: LAB | Facility: HOSPITAL | Age: 23
End: 2023-06-19
Attending: INTERNAL MEDICINE
Payer: MEDICAID

## 2023-06-19 DIAGNOSIS — Z79.52 LONG TERM CURRENT USE OF SYSTEMIC STEROIDS: ICD-10-CM

## 2023-06-19 DIAGNOSIS — I10 ESSENTIAL HYPERTENSION: ICD-10-CM

## 2023-06-19 DIAGNOSIS — Z79.899 ENCOUNTER FOR LONG-TERM (CURRENT) USE OF MEDICATIONS: ICD-10-CM

## 2023-06-19 DIAGNOSIS — E78.2 MIXED HYPERLIPIDEMIA: ICD-10-CM

## 2023-06-19 DIAGNOSIS — T86.20 COMPLICATION OF HEART TRANSPLANT, UNSPECIFIED COMPLICATION: ICD-10-CM

## 2023-06-19 DIAGNOSIS — Z94.1 STATUS POST HEART TRANSPLANT: ICD-10-CM

## 2023-06-19 LAB
ALBUMIN SERPL-MCNC: 4.5 G/DL (ref 3.5–5)
ALBUMIN/GLOB SERPL: 1.5 RATIO (ref 1.1–2)
ALP SERPL-CCNC: 110 UNIT/L (ref 40–150)
ALT SERPL-CCNC: 20 UNIT/L (ref 0–55)
AST SERPL-CCNC: 17 UNIT/L (ref 5–34)
BASOPHILS # BLD AUTO: 0.03 X10(3)/MCL
BASOPHILS NFR BLD AUTO: 0.3 %
BILIRUBIN DIRECT+TOT PNL SERPL-MCNC: 0.8 MG/DL
BNP BLD-MCNC: 29.8 PG/ML
BUN SERPL-MCNC: 7.7 MG/DL (ref 8.9–20.6)
CALCIUM SERPL-MCNC: 9.6 MG/DL (ref 8.4–10.2)
CHLORIDE SERPL-SCNC: 106 MMOL/L (ref 98–107)
CO2 SERPL-SCNC: 27 MMOL/L (ref 22–29)
CREAT SERPL-MCNC: 1.07 MG/DL (ref 0.73–1.18)
EOSINOPHIL # BLD AUTO: 0.28 X10(3)/MCL (ref 0–0.9)
EOSINOPHIL NFR BLD AUTO: 3 %
ERYTHROCYTE [DISTWIDTH] IN BLOOD BY AUTOMATED COUNT: 12.7 % (ref 11.5–17)
GFR SERPLBLD CREATININE-BSD FMLA CKD-EPI: >60 MLS/MIN/1.73/M2
GLOBULIN SER-MCNC: 3 GM/DL (ref 2.4–3.5)
GLUCOSE SERPL-MCNC: 90 MG/DL (ref 74–100)
HCT VFR BLD AUTO: 52.3 % (ref 42–52)
HGB BLD-MCNC: 17.7 G/DL (ref 14–18)
IMM GRANULOCYTES # BLD AUTO: 0.02 X10(3)/MCL (ref 0–0.04)
IMM GRANULOCYTES NFR BLD AUTO: 0.2 %
LYMPHOCYTES # BLD AUTO: 2.4 X10(3)/MCL (ref 0.6–4.6)
LYMPHOCYTES NFR BLD AUTO: 25.4 %
MAGNESIUM SERPL-MCNC: 1.6 MG/DL (ref 1.6–2.6)
MCH RBC QN AUTO: 27.9 PG (ref 27–31)
MCHC RBC AUTO-ENTMCNC: 33.8 G/DL (ref 33–36)
MCV RBC AUTO: 82.5 FL (ref 80–94)
MONOCYTES # BLD AUTO: 0.92 X10(3)/MCL (ref 0.1–1.3)
MONOCYTES NFR BLD AUTO: 9.7 %
NEUTROPHILS # BLD AUTO: 5.79 X10(3)/MCL (ref 2.1–9.2)
NEUTROPHILS NFR BLD AUTO: 61.4 %
NRBC BLD AUTO-RTO: 0 %
PLATELET # BLD AUTO: 238 X10(3)/MCL (ref 130–400)
PMV BLD AUTO: 10.8 FL (ref 7.4–10.4)
POTASSIUM SERPL-SCNC: 3.8 MMOL/L (ref 3.5–5.1)
PROT SERPL-MCNC: 7.5 GM/DL (ref 6.4–8.3)
RBC # BLD AUTO: 6.34 X10(6)/MCL (ref 4.7–6.1)
SODIUM SERPL-SCNC: 141 MMOL/L (ref 136–145)
WBC # SPEC AUTO: 9.44 X10(3)/MCL (ref 4.5–11.5)

## 2023-06-19 PROCEDURE — 80197 ASSAY OF TACROLIMUS: CPT

## 2023-06-19 PROCEDURE — 36415 COLL VENOUS BLD VENIPUNCTURE: CPT

## 2023-06-19 PROCEDURE — 83735 ASSAY OF MAGNESIUM: CPT

## 2023-06-19 PROCEDURE — 85025 COMPLETE CBC W/AUTO DIFF WBC: CPT

## 2023-06-19 PROCEDURE — 80053 COMPREHEN METABOLIC PANEL: CPT

## 2023-06-19 PROCEDURE — 83880 ASSAY OF NATRIURETIC PEPTIDE: CPT

## 2023-06-20 LAB — TACROLIMUS TROUGH BLD-MCNC: 12.4 NG/ML

## 2023-08-03 ENCOUNTER — PATIENT MESSAGE (OUTPATIENT)
Dept: TRANSPLANT | Facility: CLINIC | Age: 23
End: 2023-08-03
Payer: MEDICAID

## 2023-08-16 RX ORDER — PRAVASTATIN SODIUM 40 MG/1
TABLET ORAL
Qty: 90 TABLET | Refills: 2 | Status: SHIPPED | OUTPATIENT
Start: 2023-08-16

## 2023-08-23 RX ORDER — SULFAMETHOXAZOLE AND TRIMETHOPRIM 400; 80 MG/1; MG/1
TABLET ORAL
Qty: 90 TABLET | Refills: 2 | Status: SHIPPED | OUTPATIENT
Start: 2023-08-23

## 2023-09-05 RX ORDER — MYCOPHENOLATE MOFETIL 200 MG/ML
POWDER, FOR SUSPENSION ORAL
Qty: 480 ML | Refills: 4 | Status: CANCELLED | OUTPATIENT
Start: 2023-09-05

## 2023-09-07 ENCOUNTER — PATIENT MESSAGE (OUTPATIENT)
Dept: TRANSPLANT | Facility: CLINIC | Age: 23
End: 2023-09-07
Payer: MEDICAID

## 2023-09-07 DIAGNOSIS — Z94.1 HEART REPLACED BY TRANSPLANT: Primary | ICD-10-CM

## 2023-09-08 RX ORDER — MYCOPHENOLATE MOFETIL 200 MG/ML
POWDER, FOR SUSPENSION ORAL
Qty: 480 ML | Refills: 4 | Status: SHIPPED | OUTPATIENT
Start: 2023-09-08 | End: 2024-02-09 | Stop reason: SDUPTHER

## 2023-09-11 ENCOUNTER — OFFICE VISIT (OUTPATIENT)
Dept: TRANSPLANT | Facility: CLINIC | Age: 23
End: 2023-09-11
Payer: MEDICAID

## 2023-09-11 ENCOUNTER — HOSPITAL ENCOUNTER (OUTPATIENT)
Dept: CARDIOLOGY | Facility: HOSPITAL | Age: 23
Discharge: HOME | End: 2023-09-11
Attending: INTERNAL MEDICINE
Payer: MEDICAID

## 2023-09-11 ENCOUNTER — TELEPHONE (OUTPATIENT)
Dept: TRANSPLANT | Facility: CLINIC | Age: 23
End: 2023-09-11

## 2023-09-11 ENCOUNTER — HOSPITAL ENCOUNTER (OUTPATIENT)
Dept: RADIOLOGY | Facility: HOSPITAL | Age: 23
Discharge: HOME | End: 2023-09-11
Attending: INTERNAL MEDICINE
Payer: MEDICAID

## 2023-09-11 VITALS
OXYGEN SATURATION: 96 % | HEIGHT: 70 IN | HEART RATE: 122 BPM | BODY MASS INDEX: 23.93 KG/M2 | SYSTOLIC BLOOD PRESSURE: 148 MMHG | DIASTOLIC BLOOD PRESSURE: 67 MMHG | WEIGHT: 167.13 LBS

## 2023-09-11 VITALS — BODY MASS INDEX: 23.11 KG/M2 | WEIGHT: 156 LBS | HEIGHT: 69 IN

## 2023-09-11 DIAGNOSIS — Z79.899 IMMUNOSUPPRESSION DUE TO DRUG THERAPY: ICD-10-CM

## 2023-09-11 DIAGNOSIS — Z94.1 S/P ORTHOTOPIC HEART TRANSPLANT: ICD-10-CM

## 2023-09-11 DIAGNOSIS — T86.20 COMPLICATION OF HEART TRANSPLANT, UNSPECIFIED COMPLICATION: ICD-10-CM

## 2023-09-11 DIAGNOSIS — Z94.1 STATUS POST HEART TRANSPLANT: ICD-10-CM

## 2023-09-11 DIAGNOSIS — Z79.52 LONG TERM CURRENT USE OF SYSTEMIC STEROIDS: ICD-10-CM

## 2023-09-11 DIAGNOSIS — D84.821 IMMUNOSUPPRESSION DUE TO DRUG THERAPY: ICD-10-CM

## 2023-09-11 DIAGNOSIS — Z94.1 HEART TRANSPLANTED: ICD-10-CM

## 2023-09-11 DIAGNOSIS — Z79.899 ENCOUNTER FOR LONG-TERM (CURRENT) USE OF MEDICATIONS: ICD-10-CM

## 2023-09-11 DIAGNOSIS — L02.211 ABSCESS OF ABDOMINAL WALL: ICD-10-CM

## 2023-09-11 DIAGNOSIS — E78.49 OTHER HYPERLIPIDEMIA: ICD-10-CM

## 2023-09-11 DIAGNOSIS — R06.02 SHORTNESS OF BREATH: ICD-10-CM

## 2023-09-11 DIAGNOSIS — T86.21 CARDIAC TRANSPLANT REJECTION: ICD-10-CM

## 2023-09-11 DIAGNOSIS — Z94.1 HEART REPLACED BY TRANSPLANT: Primary | ICD-10-CM

## 2023-09-11 DIAGNOSIS — E55.9 VITAMIN D DEFICIENCY: ICD-10-CM

## 2023-09-11 DIAGNOSIS — I10 ESSENTIAL HYPERTENSION: ICD-10-CM

## 2023-09-11 LAB
ASCENDING AORTA: 2.49 CM
BSA FOR ECHO PROCEDURE: 1.86 M2
CV ECHO LV RWT: 0.6 CM
CV STRESS BASE HR: 108 BPM
DIASTOLIC BLOOD PRESSURE: 73 MMHG
DOP CALC LVOT AREA: 3.1 CM2
DOP CALC LVOT DIAMETER: 1.99 CM
DOP CALC LVOT PEAK VEL: 0.88 M/S
DOP CALC LVOT STROKE VOLUME: 41.59 CM3
DOP CALCLVOT PEAK VEL VTI: 13.38 CM
E WAVE DECELERATION TIME: 120.95 MSEC
E/A RATIO: 2.2
E/E' RATIO: 3.88 M/S
ECHO LV POSTERIOR WALL: 0.94 CM (ref 0.6–1.1)
EJECTION FRACTION: 63 %
FRACTIONAL SHORTENING: 20 % (ref 28–44)
INTERVENTRICULAR SEPTUM: 0.81 CM (ref 0.6–1.1)
IVRT: 51.38 MSEC
LEFT INTERNAL DIMENSION IN SYSTOLE: 2.52 CM (ref 2.1–4)
LEFT VENTRICLE DIASTOLIC VOLUME INDEX: 21.17 ML/M2
LEFT VENTRICLE DIASTOLIC VOLUME: 39.37 ML
LEFT VENTRICLE MASS INDEX: 39 G/M2
LEFT VENTRICLE SYSTOLIC VOLUME INDEX: 12.3 ML/M2
LEFT VENTRICLE SYSTOLIC VOLUME: 22.86 ML
LEFT VENTRICULAR INTERNAL DIMENSION IN DIASTOLE: 3.15 CM (ref 3.5–6)
LEFT VENTRICULAR MASS: 72.48 G
LV LATERAL E/E' RATIO: 2.75 M/S
LV SEPTAL E/E' RATIO: 6.6 M/S
MV PEAK A VEL: 0.3 M/S
MV PEAK E VEL: 0.66 M/S
MV STENOSIS PRESSURE HALF TIME: 35.08 MS
MV VALVE AREA P 1/2 METHOD: 6.27 CM2
OHS CV CPX 1 MINUTE RECOVERY HEART RATE: 164 BPM
OHS CV CPX 85 PERCENT MAX PREDICTED HEART RATE MALE: 167
OHS CV CPX MAX PREDICTED HEART RATE: 197
OHS CV CPX PATIENT IS FEMALE: 0
OHS CV CPX PATIENT IS MALE: 1
OHS CV CPX PEAK DIASTOLIC BLOOD PRESSURE: 74 MMHG
OHS CV CPX PEAK HEAR RATE: 200 BPM
OHS CV CPX PEAK RATE PRESSURE PRODUCT: ABNORMAL
OHS CV CPX PEAK SYSTOLIC BLOOD PRESSURE: 121 MMHG
OHS CV CPX PERCENT MAX PREDICTED HEART RATE ACHIEVED: 102
OHS CV CPX RATE PRESSURE PRODUCT PRESENTING: ABNORMAL
PISA TR MAX VEL: 2.3 M/S
POST STRESS EJECTION FRACTION: 68 %
RA PRESSURE ESTIMATED: 3 MMHG
RIGHT VENTRICULAR END-DIASTOLIC DIMENSION: 3.04 CM
RV TB RVSP: 5 MMHG
SINUS: 2.87 CM
STJ: 2.33 CM
STRESS ECHO POST EXERCISE DUR MIN: 4 MINUTES
STRESS ECHO POST EXERCISE DUR SEC: 0 SECONDS
SYSTOLIC BLOOD PRESSURE: 120 MMHG
TDI LATERAL: 0.24 M/S
TDI SEPTAL: 0.1 M/S
TDI: 0.17 M/S
TR MAX PG: 21 MMHG
TRICUSPID ANNULAR PLANE SYSTOLIC EXCURSION: 0.76 CM
TV REST PULMONARY ARTERY PRESSURE: 24 MMHG
Z-SCORE OF LEFT VENTRICULAR DIMENSION IN END DIASTOLE: -4.96
Z-SCORE OF LEFT VENTRICULAR DIMENSION IN END SYSTOLE: -1.87

## 2023-09-11 PROCEDURE — 99999 PR PBB SHADOW E&M-EST. PATIENT-LVL III: CPT | Mod: PBBFAC,,, | Performed by: INTERNAL MEDICINE

## 2023-09-11 PROCEDURE — 93351 STRESS TTE COMPLETE: CPT | Mod: 26,,, | Performed by: INTERNAL MEDICINE

## 2023-09-11 PROCEDURE — 3077F PR MOST RECENT SYSTOLIC BLOOD PRESSURE >= 140 MM HG: ICD-10-PCS | Mod: CPTII,,, | Performed by: INTERNAL MEDICINE

## 2023-09-11 PROCEDURE — 3077F SYST BP >= 140 MM HG: CPT | Mod: CPTII,,, | Performed by: INTERNAL MEDICINE

## 2023-09-11 PROCEDURE — 71046 X-RAY EXAM CHEST 2 VIEWS: CPT | Mod: 26,,, | Performed by: RADIOLOGY

## 2023-09-11 PROCEDURE — 3008F BODY MASS INDEX DOCD: CPT | Mod: CPTII,,, | Performed by: INTERNAL MEDICINE

## 2023-09-11 PROCEDURE — 99999 PR PBB SHADOW E&M-EST. PATIENT-LVL III: ICD-10-PCS | Mod: PBBFAC,,, | Performed by: INTERNAL MEDICINE

## 2023-09-11 PROCEDURE — 99214 OFFICE O/P EST MOD 30 MIN: CPT | Mod: S$PBB,,, | Performed by: INTERNAL MEDICINE

## 2023-09-11 PROCEDURE — 3078F DIAST BP <80 MM HG: CPT | Mod: CPTII,,, | Performed by: INTERNAL MEDICINE

## 2023-09-11 PROCEDURE — 3008F PR BODY MASS INDEX (BMI) DOCUMENTED: ICD-10-PCS | Mod: CPTII,,, | Performed by: INTERNAL MEDICINE

## 2023-09-11 PROCEDURE — 71046 X-RAY EXAM CHEST 2 VIEWS: CPT | Mod: TC,FY

## 2023-09-11 PROCEDURE — 99213 OFFICE O/P EST LOW 20 MIN: CPT | Mod: PBBFAC,25 | Performed by: INTERNAL MEDICINE

## 2023-09-11 PROCEDURE — 25500020 PHARM REV CODE 255: Performed by: INTERNAL MEDICINE

## 2023-09-11 PROCEDURE — 3044F PR MOST RECENT HEMOGLOBIN A1C LEVEL <7.0%: ICD-10-PCS | Mod: CPTII,,, | Performed by: INTERNAL MEDICINE

## 2023-09-11 PROCEDURE — 93352 ADMIN ECG CONTRAST AGENT: CPT | Mod: ,,, | Performed by: INTERNAL MEDICINE

## 2023-09-11 PROCEDURE — 93352 STRESS ECHO (CUPID ONLY): ICD-10-PCS | Mod: ,,, | Performed by: INTERNAL MEDICINE

## 2023-09-11 PROCEDURE — 3044F HG A1C LEVEL LT 7.0%: CPT | Mod: CPTII,,, | Performed by: INTERNAL MEDICINE

## 2023-09-11 PROCEDURE — 71046 XR CHEST PA AND LATERAL: ICD-10-PCS | Mod: 26,,, | Performed by: RADIOLOGY

## 2023-09-11 PROCEDURE — 99214 PR OFFICE/OUTPT VISIT, EST, LEVL IV, 30-39 MIN: ICD-10-PCS | Mod: S$PBB,,, | Performed by: INTERNAL MEDICINE

## 2023-09-11 PROCEDURE — 93351 STRESS TTE COMPLETE: CPT

## 2023-09-11 PROCEDURE — 1159F PR MEDICATION LIST DOCUMENTED IN MEDICAL RECORD: ICD-10-PCS | Mod: CPTII,,, | Performed by: INTERNAL MEDICINE

## 2023-09-11 PROCEDURE — 1159F MED LIST DOCD IN RCRD: CPT | Mod: CPTII,,, | Performed by: INTERNAL MEDICINE

## 2023-09-11 PROCEDURE — 3078F PR MOST RECENT DIASTOLIC BLOOD PRESSURE < 80 MM HG: ICD-10-PCS | Mod: CPTII,,, | Performed by: INTERNAL MEDICINE

## 2023-09-11 PROCEDURE — 93351 STRESS ECHO (CUPID ONLY): ICD-10-PCS | Mod: 26,,, | Performed by: INTERNAL MEDICINE

## 2023-09-11 RX ORDER — TACROLIMUS 1 MG/1
1 CAPSULE ORAL EVERY 12 HOURS
Qty: 120 CAPSULE | Refills: 10
Start: 2023-09-11 | End: 2024-02-28

## 2023-09-11 RX ORDER — VIT C/E/ZN/COPPR/LUTEIN/ZEAXAN 250MG-90MG
1000 CAPSULE ORAL DAILY
Qty: 30 CAPSULE | Refills: 11 | Status: SHIPPED | OUTPATIENT
Start: 2023-09-11

## 2023-09-11 RX ADMIN — HUMAN ALBUMIN MICROSPHERES AND PERFLUTREN 0.11 MG: 10; .22 INJECTION, SOLUTION INTRAVENOUS at 08:09

## 2023-09-11 NOTE — NURSING NOTE
Patient identified by 2 identifiers. Allergies reviewed, procedure explained and pt verbalized understanding.  22 g IV placed to Left hand, flushed w/ 10cc NS pre & post contrast administration.  1 cc Optison administered for resting and peak stress images, echo images obtained.  Patient tolerated procedure well.  IV D/C'ed by Mervat, pressure dressing applied.  Left ambulatory accompanied by mother.

## 2023-09-11 NOTE — PROGRESS NOTES
Subjective:   Mr. Aponte is a 23 y.o. year old White male who received a  heart transplant on 9/29/14.      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    Seen in clinic last 6/2022, coming in today for follow up.  Since his last visit he says that he has been doing well.  Denies any cardiac complaints such as chest discomfort, shortness of breath, palpitations, presyncope, syncope, leg swelling, PND, or orthopnea.  Has some tremors with his tacrolimus but no other significant side effects.  Says that he did stop taking his calcium/vitamin-D as ran out of his prescription.  Otherwise no significant issues with his medications.    Review of Systems   Constitutional: Negative for chills and fever.   HENT:  Negative for hearing loss.    Eyes:  Negative for visual disturbance.   Cardiovascular:  Negative for chest pain, dyspnea on exertion, irregular heartbeat, leg swelling, orthopnea, palpitations, paroxysmal nocturnal dyspnea and syncope.   Respiratory:  Negative for cough and shortness of breath.    Musculoskeletal:  Negative for muscle weakness.   Gastrointestinal:  Negative for  "diarrhea, nausea and vomiting.   Neurological:  Negative for focal weakness.   Psychiatric/Behavioral:  Negative for memory loss.        Objective:   Blood pressure (!) 148/67, pulse (!) 122, height 5' 10" (1.778 m), weight 75.8 kg (167 lb 1.7 oz), SpO2 96 %.body mass index is 23.98 kg/m².    Physical Exam  Vitals reviewed.   Constitutional:       General: He is not in acute distress.  HENT:      Head: Atraumatic.   Eyes:      Extraocular Movements: Extraocular movements intact.   Cardiovascular:      Rate and Rhythm: Normal rate and regular rhythm.      Heart sounds: Normal heart sounds.   Pulmonary:      Breath sounds: Normal breath sounds.   Abdominal:      Palpations: Abdomen is soft.      Tenderness: There is no abdominal tenderness.   Musculoskeletal:         General: Normal range of motion.      Right lower leg: No edema.      Left lower leg: No edema.   Neurological:      General: No focal deficit present.      Mental Status: He is alert and oriented to person, place, and time.         Lab Results   Component Value Date    WBC 11.19 09/11/2023    HGB 17.5 09/11/2023    HCT 50.7 09/11/2023    MCV 79 (L) 09/11/2023     09/11/2023    CHLORIDE 106 06/19/2023    CO2 23 09/11/2023    CREATININE 0.9 09/11/2023    GLUCOSE 90 06/19/2023    CALCIUM 9.2 09/11/2023    ALBUMIN 4.4 09/11/2023    AST 16 09/11/2023    BNP 25 09/11/2023    ALT 22 09/11/2023       Lab Results   Component Value Date    INR 1.1 06/17/2021       BNP   Date Value Ref Range Status   09/11/2023 25 0 - 99 pg/mL Final     Comment:     Values of less than 100 pg/ml are consistent with non-CHF populations.   06/28/2022 35 0 - 99 pg/mL Final     Comment:     Values of less than 100 pg/ml are consistent with non-CHF populations.   02/18/2022 26 0 - 99 pg/mL Final     Comment:     Values of less than 100 pg/ml are consistent with non-CHF populations.     Natriuretic Peptide   Date Value Ref Range Status   06/19/2023 29.8 <=100.0 pg/mL Final " "  07/07/2022 32.0 <=100.0 pg/mL Final       No results found for: "LDH"    Tacrolimus Lvl   Date Value Ref Range Status   09/11/2023 12.3 5.0 - 15.0 ng/mL Final     Comment:     Testing performed by a chemiluminescent microparticle   immunoassay on the [employer] System.    CAUTION: No firm therapeutic range exists for tacrolimus in whole   blood. The   complexity of the clinical state, individual differences in   sensitivity to   immunosuppressive and nephrotoxic effects of tacrolimus,   co-administration   of other immunosuppressants, type of transplant, time post-transplant   and a   number of other factors contribute to different requirements for   optimal   blood levels of tacrolimus. Therefore, individual tacrolimus values   cannot   be used as the sole indicator for making changes in treatment regimen   and   each patient should be thoroughly evaluated clinically before changes   in   treatment regimens are made. Each user must establish his or her own   ranges   based on clinical experience.  Therapeutic ranges vary according to the commercial test used, and   therefore   should be established for each commercial test. Values obtained with   different assay methods cannot be used interchangeably due to   differences in   assay methods and cross-reactivity with metabolites, nor should   correction   factors be applied. Therefore, consistent use of one assay for   individual   patients is recommended.       No results found for: "SIROLIMUS"  No results found for: "CYCLOSPORINE"    No results found for this or any previous visit.    No results found for this or any previous visit.      Labs were reviewed with the patient.    Assessment:     1. Heart replaced by transplant    2. Vitamin D deficiency    3. Heart transplanted    4. Essential hypertension    5. Cardiac transplant rejection    6. Other hyperlipidemia    7. S/P orthotopic heart transplant    8. Abscess of abdominal wall    9. Immunosuppression " due to drug therapy        Plan:     - presents today for follow-up.  Overall doing well, no cardiovascular complaints of note.  Euvolemic on exam.  - stress echocardiogram done today shows good graft function, no evidence of coronary allograft vasculopathy.  - prescription for calcium/vitamin-D sent to local pharmacy.  Continue other medical therapy with no changes.      Return instructions as set forth by post transplant schedule or as needed:    Clinic: Return for labs and/or biopsy weekly the first month, every two weeks during month 2 and then monthly for the first year at the provider or coordinator's discretion. During the second year, return to clinic every 3 months. Post transplant year 3-5 return every 6 months. There will be a comprehensive post transplant evaluation every year that may include LHC/RHC/biopsy, stress test, echo, CXR, and other health screening exams.    In addition to the clinical assessment, I have ordered Allomap testing for this patient to assist in identification of moderate/severe acute cellular rejection (ACR) in a pt with stable Allograft function instead of endomyocardial biopsy.     Patient is reminded to call with any health changes as these can be early signs of transplant complications. Patient is advised to make sure any new medications or changes of old medications are discussed with a pharmacist or physician knowledgeable with transplant to avoid rejection/drug toxicity related to significant drug interactions.    Patient advised that it is recommended that all transplanted patients, and their close contacts and household members receive Covid vaccination.    UNOS Patient Status  Functional Status: 80% - Normal activity with effort: some symptoms of disease  Physical Capacity: No Limitations  Working for Income: No  If no, reason not working: Patient Choice - Other    Vishal Gore MD

## 2023-09-11 NOTE — TELEPHONE ENCOUNTER
Met with pt and mother in clinic. Pt doing well, no complaints. Reviewed medications with pt and mother. He has not been taking cholecalciferol due to no refills. Dr. Gore refilled. Pt only taking tacrolimus 1/1, level is 12.3 (goal 8-12). Changed dose in Epic and no changes to dose per Dr. Gore due to past rejections. Reviewed labs with pt and mother. DSE 60-65% no myocardial ischemia. Pt's mother asked if [facility] can do DSA, will check with lab to see if they can do DSA in the future. Pt will go to [facility] for labs.

## 2023-09-12 ENCOUNTER — TELEPHONE (OUTPATIENT)
Dept: TRANSPLANT | Facility: CLINIC | Age: 23
End: 2023-09-12
Payer: MEDICAID

## 2023-09-12 NOTE — TELEPHONE ENCOUNTER
Left a message for [facility]'s pharmacy regarding his liquid Cellcept PA, response from Medicaid, pt has another primary payor source.

## 2023-12-08 ENCOUNTER — TELEPHONE (OUTPATIENT)
Dept: TRANSPLANT | Facility: CLINIC | Age: 23
End: 2023-12-08
Payer: MEDICAID

## 2023-12-08 NOTE — TELEPHONE ENCOUNTER
Spoke with pt's mother (Minerva) and scheduled routine 3 month labs for next week.   Discussed his annual that will be his 10 year annual in Sept. Also discussed routine 3 month labs.

## 2023-12-13 ENCOUNTER — LAB VISIT (OUTPATIENT)
Dept: LAB | Facility: HOSPITAL | Age: 23
End: 2023-12-13
Attending: INTERNAL MEDICINE
Payer: MEDICAID

## 2023-12-13 DIAGNOSIS — Z94.1 STATUS POST HEART TRANSPLANT: ICD-10-CM

## 2023-12-13 DIAGNOSIS — I10 ESSENTIAL HYPERTENSION: ICD-10-CM

## 2023-12-13 DIAGNOSIS — E78.2 MIXED HYPERLIPIDEMIA: ICD-10-CM

## 2023-12-13 DIAGNOSIS — Z79.52 LONG TERM CURRENT USE OF SYSTEMIC STEROIDS: ICD-10-CM

## 2023-12-13 DIAGNOSIS — T86.20 COMPLICATION OF HEART TRANSPLANT, UNSPECIFIED COMPLICATION: ICD-10-CM

## 2023-12-13 DIAGNOSIS — Z79.899 ENCOUNTER FOR LONG-TERM (CURRENT) USE OF MEDICATIONS: ICD-10-CM

## 2023-12-13 LAB
ALBUMIN SERPL-MCNC: 4.3 G/DL (ref 3.5–5)
ALBUMIN/GLOB SERPL: 1.5 RATIO (ref 1.1–2)
ALP SERPL-CCNC: 124 UNIT/L (ref 40–150)
ALT SERPL-CCNC: 25 UNIT/L (ref 0–55)
AST SERPL-CCNC: 17 UNIT/L (ref 5–34)
BASOPHILS # BLD AUTO: 0.03 X10(3)/MCL
BASOPHILS NFR BLD AUTO: 0.3 %
BILIRUB SERPL-MCNC: 0.7 MG/DL
BNP BLD-MCNC: 43.4 PG/ML
BUN SERPL-MCNC: 8.6 MG/DL (ref 8.9–20.6)
CALCIUM SERPL-MCNC: 9.1 MG/DL (ref 8.4–10.2)
CHLORIDE SERPL-SCNC: 103 MMOL/L (ref 98–107)
CHOLEST SERPL-MCNC: 175 MG/DL
CHOLEST/HDLC SERPL: 4 {RATIO} (ref 0–5)
CO2 SERPL-SCNC: 23 MMOL/L (ref 22–29)
CREAT SERPL-MCNC: 1.03 MG/DL (ref 0.73–1.18)
EOSINOPHIL # BLD AUTO: 0.44 X10(3)/MCL (ref 0–0.9)
EOSINOPHIL NFR BLD AUTO: 4.2 %
ERYTHROCYTE [DISTWIDTH] IN BLOOD BY AUTOMATED COUNT: 13 % (ref 11.5–17)
GFR SERPLBLD CREATININE-BSD FMLA CKD-EPI: >60 MLS/MIN/1.73/M2
GLOBULIN SER-MCNC: 2.9 GM/DL (ref 2.4–3.5)
GLUCOSE SERPL-MCNC: 91 MG/DL (ref 74–100)
HCT VFR BLD AUTO: 50.1 % (ref 42–52)
HDLC SERPL-MCNC: 48 MG/DL (ref 35–60)
HGB BLD-MCNC: 17.1 G/DL (ref 14–18)
IMM GRANULOCYTES # BLD AUTO: 0.04 X10(3)/MCL (ref 0–0.04)
IMM GRANULOCYTES NFR BLD AUTO: 0.4 %
LDLC SERPL CALC-MCNC: 96 MG/DL (ref 50–140)
LYMPHOCYTES # BLD AUTO: 3.52 X10(3)/MCL (ref 0.6–4.6)
LYMPHOCYTES NFR BLD AUTO: 33.4 %
MAGNESIUM SERPL-MCNC: 1.5 MG/DL (ref 1.6–2.6)
MCH RBC QN AUTO: 27.7 PG (ref 27–31)
MCHC RBC AUTO-ENTMCNC: 34.1 G/DL (ref 33–36)
MCV RBC AUTO: 81.2 FL (ref 80–94)
MONOCYTES # BLD AUTO: 0.89 X10(3)/MCL (ref 0.1–1.3)
MONOCYTES NFR BLD AUTO: 8.4 %
NEUTROPHILS # BLD AUTO: 5.63 X10(3)/MCL (ref 2.1–9.2)
NEUTROPHILS NFR BLD AUTO: 53.3 %
NRBC BLD AUTO-RTO: 0 %
PLATELET # BLD AUTO: 253 X10(3)/MCL (ref 130–400)
PMV BLD AUTO: 9.9 FL (ref 7.4–10.4)
POTASSIUM SERPL-SCNC: 3.6 MMOL/L (ref 3.5–5.1)
PROT SERPL-MCNC: 7.2 GM/DL (ref 6.4–8.3)
RBC # BLD AUTO: 6.17 X10(6)/MCL (ref 4.7–6.1)
SODIUM SERPL-SCNC: 139 MMOL/L (ref 136–145)
TRIGL SERPL-MCNC: 154 MG/DL (ref 34–140)
VLDLC SERPL CALC-MCNC: 31 MG/DL
WBC # SPEC AUTO: 10.55 X10(3)/MCL (ref 4.5–11.5)

## 2023-12-13 PROCEDURE — 83880 ASSAY OF NATRIURETIC PEPTIDE: CPT

## 2023-12-13 PROCEDURE — 85025 COMPLETE CBC W/AUTO DIFF WBC: CPT

## 2023-12-13 PROCEDURE — 80061 LIPID PANEL: CPT

## 2023-12-13 PROCEDURE — 83735 ASSAY OF MAGNESIUM: CPT

## 2023-12-13 PROCEDURE — 36415 COLL VENOUS BLD VENIPUNCTURE: CPT

## 2023-12-13 PROCEDURE — 80197 ASSAY OF TACROLIMUS: CPT

## 2023-12-13 PROCEDURE — 80053 COMPREHEN METABOLIC PANEL: CPT

## 2023-12-14 LAB — TACROLIMUS TROUGH BLD-MCNC: 13.4 NG/ML

## 2024-01-10 RX ORDER — PREDNISONE 5 MG/1
TABLET ORAL
Qty: 90 TABLET | Refills: 2 | Status: SHIPPED | OUTPATIENT
Start: 2024-01-10

## 2024-02-06 ENCOUNTER — PATIENT MESSAGE (OUTPATIENT)
Dept: TRANSPLANT | Facility: CLINIC | Age: 24
End: 2024-02-06
Payer: MEDICAID

## 2024-02-09 DIAGNOSIS — Z94.1 HEART REPLACED BY TRANSPLANT: ICD-10-CM

## 2024-02-09 RX ORDER — MYCOPHENOLATE MOFETIL 200 MG/ML
POWDER, FOR SUSPENSION ORAL
Qty: 480 ML | Refills: 4 | Status: SHIPPED | OUTPATIENT
Start: 2024-02-09

## 2024-02-28 ENCOUNTER — PATIENT MESSAGE (OUTPATIENT)
Dept: TRANSPLANT | Facility: CLINIC | Age: 24
End: 2024-02-28
Payer: MEDICAID

## 2024-02-28 DIAGNOSIS — Z94.1 HEART REPLACED BY TRANSPLANT: ICD-10-CM

## 2024-02-29 RX ORDER — TACROLIMUS 1 MG/1
1 CAPSULE ORAL EVERY 12 HOURS
Qty: 120 CAPSULE | Refills: 11 | Status: SHIPPED | OUTPATIENT
Start: 2024-02-29

## 2024-04-03 ENCOUNTER — PATIENT MESSAGE (OUTPATIENT)
Dept: TRANSPLANT | Facility: CLINIC | Age: 24
End: 2024-04-03
Payer: MEDICAID

## 2024-04-03 ENCOUNTER — LAB VISIT (OUTPATIENT)
Dept: LAB | Facility: HOSPITAL | Age: 24
End: 2024-04-03
Attending: INTERNAL MEDICINE
Payer: MEDICAID

## 2024-04-03 DIAGNOSIS — Z79.52 LONG TERM CURRENT USE OF SYSTEMIC STEROIDS: ICD-10-CM

## 2024-04-03 DIAGNOSIS — E78.2 MIXED HYPERLIPIDEMIA: ICD-10-CM

## 2024-04-03 DIAGNOSIS — Z79.899 ENCOUNTER FOR LONG-TERM (CURRENT) USE OF MEDICATIONS: ICD-10-CM

## 2024-04-03 DIAGNOSIS — I10 ESSENTIAL HYPERTENSION: ICD-10-CM

## 2024-04-03 DIAGNOSIS — Z94.1 STATUS POST HEART TRANSPLANT: ICD-10-CM

## 2024-04-03 DIAGNOSIS — T86.20 COMPLICATION OF HEART TRANSPLANT, UNSPECIFIED COMPLICATION: ICD-10-CM

## 2024-04-03 LAB
ALBUMIN SERPL-MCNC: 4.4 G/DL (ref 3.5–5)
ALBUMIN/GLOB SERPL: 1.4 RATIO (ref 1.1–2)
ALP SERPL-CCNC: 149 UNIT/L (ref 40–150)
ALT SERPL-CCNC: 27 UNIT/L (ref 0–55)
AST SERPL-CCNC: 18 UNIT/L (ref 5–34)
BASOPHILS # BLD AUTO: 0.04 X10(3)/MCL
BASOPHILS NFR BLD AUTO: 0.4 %
BILIRUB SERPL-MCNC: 0.8 MG/DL
BNP BLD-MCNC: 44.7 PG/ML
BUN SERPL-MCNC: 11.8 MG/DL (ref 8.9–20.6)
CALCIUM SERPL-MCNC: 9.6 MG/DL (ref 8.4–10.2)
CHLORIDE SERPL-SCNC: 106 MMOL/L (ref 98–107)
CHOLEST SERPL-MCNC: 175 MG/DL
CHOLEST/HDLC SERPL: 4 {RATIO} (ref 0–5)
CO2 SERPL-SCNC: 26 MMOL/L (ref 22–29)
CREAT SERPL-MCNC: 1.04 MG/DL (ref 0.73–1.18)
EOSINOPHIL # BLD AUTO: 0.43 X10(3)/MCL (ref 0–0.9)
EOSINOPHIL NFR BLD AUTO: 4.3 %
ERYTHROCYTE [DISTWIDTH] IN BLOOD BY AUTOMATED COUNT: 13.1 % (ref 11.5–17)
GFR SERPLBLD CREATININE-BSD FMLA CKD-EPI: >60 MLS/MIN/1.73/M2
GLOBULIN SER-MCNC: 3.1 GM/DL (ref 2.4–3.5)
GLUCOSE SERPL-MCNC: 93 MG/DL (ref 74–100)
HCT VFR BLD AUTO: 50.3 % (ref 42–52)
HDLC SERPL-MCNC: 45 MG/DL (ref 35–60)
HGB BLD-MCNC: 17 G/DL (ref 14–18)
IMM GRANULOCYTES # BLD AUTO: 0.03 X10(3)/MCL (ref 0–0.04)
IMM GRANULOCYTES NFR BLD AUTO: 0.3 %
LDLC SERPL CALC-MCNC: 89 MG/DL (ref 50–140)
LYMPHOCYTES # BLD AUTO: 3.36 X10(3)/MCL (ref 0.6–4.6)
LYMPHOCYTES NFR BLD AUTO: 33.6 %
MAGNESIUM SERPL-MCNC: 1.5 MG/DL (ref 1.6–2.6)
MCH RBC QN AUTO: 27.6 PG (ref 27–31)
MCHC RBC AUTO-ENTMCNC: 33.8 G/DL (ref 33–36)
MCV RBC AUTO: 81.5 FL (ref 80–94)
MONOCYTES # BLD AUTO: 0.84 X10(3)/MCL (ref 0.1–1.3)
MONOCYTES NFR BLD AUTO: 8.4 %
NEUTROPHILS # BLD AUTO: 5.31 X10(3)/MCL (ref 2.1–9.2)
NEUTROPHILS NFR BLD AUTO: 53 %
NRBC BLD AUTO-RTO: 0 %
PLATELET # BLD AUTO: 206 X10(3)/MCL (ref 130–400)
PMV BLD AUTO: 11.5 FL (ref 7.4–10.4)
POTASSIUM SERPL-SCNC: 3.8 MMOL/L (ref 3.5–5.1)
PROT SERPL-MCNC: 7.5 GM/DL (ref 6.4–8.3)
RBC # BLD AUTO: 6.17 X10(6)/MCL (ref 4.7–6.1)
SODIUM SERPL-SCNC: 141 MMOL/L (ref 136–145)
TRIGL SERPL-MCNC: 203 MG/DL (ref 34–140)
VLDLC SERPL CALC-MCNC: 41 MG/DL
WBC # SPEC AUTO: 10.01 X10(3)/MCL (ref 4.5–11.5)

## 2024-04-03 PROCEDURE — 83735 ASSAY OF MAGNESIUM: CPT

## 2024-04-03 PROCEDURE — 83880 ASSAY OF NATRIURETIC PEPTIDE: CPT

## 2024-04-03 PROCEDURE — 80061 LIPID PANEL: CPT

## 2024-04-03 PROCEDURE — 80053 COMPREHEN METABOLIC PANEL: CPT

## 2024-04-03 PROCEDURE — 36415 COLL VENOUS BLD VENIPUNCTURE: CPT

## 2024-04-03 PROCEDURE — 80197 ASSAY OF TACROLIMUS: CPT

## 2024-04-03 PROCEDURE — 85025 COMPLETE CBC W/AUTO DIFF WBC: CPT

## 2024-04-03 RX ORDER — LANOLIN ALCOHOL/MO/W.PET/CERES
1 CREAM (GRAM) TOPICAL 2 TIMES DAILY
Qty: 60 TABLET | Refills: 10 | Status: SHIPPED | OUTPATIENT
Start: 2024-04-03

## 2024-04-05 ENCOUNTER — TELEPHONE (OUTPATIENT)
Dept: TRANSPLANT | Facility: CLINIC | Age: 24
End: 2024-04-05
Payer: MEDICAID

## 2024-04-05 LAB — TACROLIMUS TROUGH BLD-MCNC: 14.6 NG/ML

## 2024-04-08 ENCOUNTER — TELEPHONE (OUTPATIENT)
Dept: TRANSPLANT | Facility: CLINIC | Age: 24
End: 2024-04-08
Payer: MEDICAID

## 2024-04-08 NOTE — TELEPHONE ENCOUNTER
Spoke with pt and the tacrolimus level is a true trough and pt is taking sublingual due to trouble swallowing pills. Explained that I will discuss levels with staff doctor and see about decreasing dose.

## 2024-04-09 NOTE — TELEPHONE ENCOUNTER
Spoke with pt and he stated that the tacrolimus level timing was 12 hours after his last dose at night. Pt takes 1 mg Sublingual BID, will discuss with team regarding dose.

## 2024-05-31 RX ORDER — PRAVASTATIN SODIUM 40 MG/1
TABLET ORAL
Qty: 90 TABLET | Refills: 3 | Status: SHIPPED | OUTPATIENT
Start: 2024-05-31

## 2024-06-03 ENCOUNTER — TELEPHONE (OUTPATIENT)
Dept: TRANSPLANT | Facility: CLINIC | Age: 24
End: 2024-06-03
Payer: MEDICAID

## 2024-06-03 NOTE — TELEPHONE ENCOUNTER
Spoke with pt and his mother regarding his routine labs and plan for annual 10 year post, reviewed what testing will be needed and reviewed meds and labs scheduled for Wed this week.

## 2024-06-05 ENCOUNTER — LAB VISIT (OUTPATIENT)
Dept: LAB | Facility: HOSPITAL | Age: 24
End: 2024-06-05
Attending: INTERNAL MEDICINE
Payer: MEDICAID

## 2024-06-05 DIAGNOSIS — T86.20 COMPLICATION OF HEART TRANSPLANT, UNSPECIFIED COMPLICATION: ICD-10-CM

## 2024-06-05 DIAGNOSIS — I10 ESSENTIAL HYPERTENSION: ICD-10-CM

## 2024-06-05 DIAGNOSIS — Z79.52 LONG TERM CURRENT USE OF SYSTEMIC STEROIDS: ICD-10-CM

## 2024-06-05 DIAGNOSIS — Z79.899 ENCOUNTER FOR LONG-TERM (CURRENT) USE OF MEDICATIONS: ICD-10-CM

## 2024-06-05 DIAGNOSIS — E78.2 MIXED HYPERLIPIDEMIA: ICD-10-CM

## 2024-06-05 DIAGNOSIS — Z94.1 STATUS POST HEART TRANSPLANT: ICD-10-CM

## 2024-06-05 LAB
ALBUMIN SERPL-MCNC: 4.4 G/DL (ref 3.5–5)
ALBUMIN/GLOB SERPL: 1.6 RATIO (ref 1.1–2)
ALP SERPL-CCNC: 130 UNIT/L (ref 40–150)
ALT SERPL-CCNC: 24 UNIT/L (ref 0–55)
ANION GAP SERPL CALC-SCNC: 10 MEQ/L
AST SERPL-CCNC: 15 UNIT/L (ref 5–34)
BASOPHILS # BLD AUTO: 0.05 X10(3)/MCL
BASOPHILS NFR BLD AUTO: 0.5 %
BILIRUB SERPL-MCNC: 0.8 MG/DL
BNP BLD-MCNC: 36.6 PG/ML
BUN SERPL-MCNC: 7 MG/DL (ref 8.9–20.6)
CALCIUM SERPL-MCNC: 9.4 MG/DL (ref 8.4–10.2)
CHLORIDE SERPL-SCNC: 103 MMOL/L (ref 98–107)
CO2 SERPL-SCNC: 27 MMOL/L (ref 22–29)
CREAT SERPL-MCNC: 0.86 MG/DL (ref 0.73–1.18)
CREAT/UREA NIT SERPL: 8
EOSINOPHIL # BLD AUTO: 0.61 X10(3)/MCL (ref 0–0.9)
EOSINOPHIL NFR BLD AUTO: 6.1 %
ERYTHROCYTE [DISTWIDTH] IN BLOOD BY AUTOMATED COUNT: 12.5 % (ref 11.5–17)
GFR SERPLBLD CREATININE-BSD FMLA CKD-EPI: >60 ML/MIN/1.73/M2
GLOBULIN SER-MCNC: 2.7 GM/DL (ref 2.4–3.5)
GLUCOSE SERPL-MCNC: 92 MG/DL (ref 74–100)
HCT VFR BLD AUTO: 50 % (ref 42–52)
HGB BLD-MCNC: 16.8 G/DL (ref 14–18)
IMM GRANULOCYTES # BLD AUTO: 0.03 X10(3)/MCL (ref 0–0.04)
IMM GRANULOCYTES NFR BLD AUTO: 0.3 %
LYMPHOCYTES # BLD AUTO: 2.86 X10(3)/MCL (ref 0.6–4.6)
LYMPHOCYTES NFR BLD AUTO: 28.4 %
MCH RBC QN AUTO: 27.5 PG (ref 27–31)
MCHC RBC AUTO-ENTMCNC: 33.6 G/DL (ref 33–36)
MCV RBC AUTO: 82 FL (ref 80–94)
MONOCYTES # BLD AUTO: 0.95 X10(3)/MCL (ref 0.1–1.3)
MONOCYTES NFR BLD AUTO: 9.4 %
NEUTROPHILS # BLD AUTO: 5.57 X10(3)/MCL (ref 2.1–9.2)
NEUTROPHILS NFR BLD AUTO: 55.3 %
NRBC BLD AUTO-RTO: 0 %
PLATELET # BLD AUTO: 252 X10(3)/MCL (ref 130–400)
PMV BLD AUTO: 10.9 FL (ref 7.4–10.4)
POTASSIUM SERPL-SCNC: 3.9 MMOL/L (ref 3.5–5.1)
PROT SERPL-MCNC: 7.1 GM/DL (ref 6.4–8.3)
RBC # BLD AUTO: 6.1 X10(6)/MCL (ref 4.7–6.1)
SODIUM SERPL-SCNC: 140 MMOL/L (ref 136–145)
WBC # SPEC AUTO: 10.07 X10(3)/MCL (ref 4.5–11.5)

## 2024-06-05 PROCEDURE — 80197 ASSAY OF TACROLIMUS: CPT

## 2024-06-05 PROCEDURE — 83880 ASSAY OF NATRIURETIC PEPTIDE: CPT

## 2024-06-05 PROCEDURE — 85025 COMPLETE CBC W/AUTO DIFF WBC: CPT

## 2024-06-05 PROCEDURE — 36415 COLL VENOUS BLD VENIPUNCTURE: CPT

## 2024-06-05 PROCEDURE — 80053 COMPREHEN METABOLIC PANEL: CPT

## 2024-06-06 LAB — TACROLIMUS TROUGH BLD-MCNC: 9.3 NG/ML

## 2024-07-10 ENCOUNTER — PATIENT MESSAGE (OUTPATIENT)
Dept: TRANSPLANT | Facility: CLINIC | Age: 24
End: 2024-07-10
Payer: MEDICAID

## 2024-07-10 DIAGNOSIS — Z94.1 HEART REPLACED BY TRANSPLANT: ICD-10-CM

## 2024-07-10 RX ORDER — MYCOPHENOLATE MOFETIL 200 MG/ML
POWDER, FOR SUSPENSION ORAL
Qty: 480 ML | Refills: 11 | Status: SHIPPED | OUTPATIENT
Start: 2024-07-10

## 2024-10-02 ENCOUNTER — TELEPHONE (OUTPATIENT)
Dept: TRANSPLANT | Facility: CLINIC | Age: 24
End: 2024-10-02
Payer: MEDICAID

## 2024-10-02 DIAGNOSIS — Z94.1 STATUS POST HEART TRANSPLANT: ICD-10-CM

## 2024-10-02 DIAGNOSIS — Z79.899 ENCOUNTER FOR LONG-TERM (CURRENT) USE OF MEDICATIONS: Primary | ICD-10-CM

## 2024-10-02 NOTE — LETTER
October 2, 2024    Wilmer Aponte  [address] [place] [address]  MRN: [id number]          Dear Wlimer Aponte:    Congratulations on your Heart anniversary - 10 years!     We recommend you complete the following on an annual basis.  Our patients need to have a primary care physician near home.    For your annual visit, you will need the following tests completed:    Annual visit with the transplant cardiologist and transplant coordinator  An Echocardiogram  angiogram (Left Heart cath)  Chest x-ray within the last year  Fasting labs: CBC, CMP, lipid panel, magnesium, HLA DSA, Tacrolimus level   See Dermatology annually for a full body skin exam  See your Dentist twice a year  Get a flu shot annually  See your PCP for recommendations on colonoscopy and other preventative health measures    Please let me know if you have any further questions.  My direct number is [phone].    Sincerely,      Caitlin Hernandez RN BSN  Heart Transplant Coordinator  [address] [place] [address] [address] [place] [address]  [phone]

## 2024-10-04 DIAGNOSIS — Z94.1 HEART REPLACED BY TRANSPLANT: Primary | ICD-10-CM

## 2024-10-04 RX ORDER — PREDNISONE 5 MG/1
TABLET ORAL
Qty: 90 TABLET | Refills: 2 | Status: SHIPPED | OUTPATIENT
Start: 2024-10-04

## 2025-01-08 ENCOUNTER — PATIENT MESSAGE (OUTPATIENT)
Dept: TRANSPLANT | Facility: CLINIC | Age: 25
End: 2025-01-08
Payer: MEDICAID

## 2025-01-13 ENCOUNTER — PATIENT MESSAGE (OUTPATIENT)
Dept: TRANSPLANT | Facility: CLINIC | Age: 25
End: 2025-01-13
Payer: MEDICAID

## 2025-01-14 DIAGNOSIS — Z94.1 HEART TRANSPLANT RECIPIENT: Primary | ICD-10-CM

## 2025-01-14 RX ORDER — DIPHENHYDRAMINE HCL 50 MG
50 CAPSULE ORAL ONCE
OUTPATIENT
Start: 2025-01-14 | End: 2025-01-14

## 2025-01-14 RX ORDER — SODIUM CHLORIDE 0.9 % (FLUSH) 0.9 %
10 SYRINGE (ML) INJECTION
Status: SHIPPED | OUTPATIENT
Start: 2025-01-14

## 2025-01-14 RX ORDER — SODIUM CHLORIDE 9 MG/ML
INJECTION, SOLUTION INTRAVENOUS CONTINUOUS
OUTPATIENT
Start: 2025-01-14 | End: 2025-01-14

## 2025-01-24 ENCOUNTER — TELEPHONE (OUTPATIENT)
Dept: TRANSPLANT | Facility: CLINIC | Age: 25
End: 2025-01-24
Payer: MEDICAID

## 2025-01-24 DIAGNOSIS — Z79.899 ENCOUNTER FOR LONG-TERM (CURRENT) USE OF MEDICATIONS: Primary | ICD-10-CM

## 2025-01-24 DIAGNOSIS — Z94.1 STATUS POST HEART TRANSPLANT: ICD-10-CM

## 2025-01-30 NOTE — TELEPHONE ENCOUNTER
"[facility]   Physical Therapy Treatment Note/30 day progress report     Name: Lolly Ramírez  Clinic Number: [id number]    Therapy Diagnosis:   Encounter Diagnoses   Name Primary?    Impaired functional mobility, balance, gait, and endurance Yes    Decreased range of motion (ROM) of left knee      Physician: Arben Coombs, *    Visit Date: 1/17/2024    Physician Orders: PT Eval and Treat Knees  Medical Diagnosis from Referral: Status post total knee replacement, left  Evaluation Date: 12/12/2023  Authorization Period Expiration: 12/31/2024  Plan of Care Expiration: 1/25/2024  Progress Note Due: 1/12/2024  Date of Surgery: 11/20/2023  Visit # / Visits authorized: 5/ 20 (11 total)   FOTO: 2/ 3 Completed 12/27/2023 64% Functional Score  KOJR JOLLY. 70.7/100 Lower Score = Greater Disability              Next survey due week of 1/12/2024     Precautions: Diabetes and Fall Patient has significant sensitivity to adhesive and has not tolerated kinesiology taping well previously on shoulder.     PTA Visit #: 0/5     Time In: 0809  Time Out: 0902  Total Billable Time: 50 minutes    SUBJECTIVE     Pt reports: "I went to "[employer] and [employer] yesterday.  That was a lot of walking and that R knee was really hurting.."  She was somewhat compliant with home exercise program.  Response to previous treatment: "Sore."  Functional change: Ambulating without devices    Pain: 0/10   Location: left knee    R knee 8/10    OBJECTIVE     AROM L knee 0* - 125*  PROM L knee 0* - 130*  Strength L hip 4/5-4+/5, knee 4/5-4+/5  Palpation: good scar mobility noted    Treatment     Lolly received the treatments listed below:      therapeutic exercises to develop strength, endurance, ROM, and flexibility for 50 minutes including:  Recumbent bike w/seat at 7 to start making full revolutions x8'  Standing LE AROM using 3# x20 ea  March  Abduction  Extension  Hamstring curl  Hip flexion SLR  Mini squat x20  L Closed chain " Orders entered for annual in March. Spoke with pt's Mother and she stated understanding and will arrive on 3/23/25 and stay at the [facility] for 1/23 and 1/24. Appointment slip mailed to Pt's home address.    "TKE on 6" step 2x10   L step up & down 6" step x10  Shuttle leg press   Double leg using 37# x20   L single using 25# x20  Sitting Piriformis stretch on stool 3x30s    L Hamstring stretch with toes up (to include gastroc) 3x30s  LAQ using 3# x20 (0* extension)  Marching 3# x 20  A/AAROM heel slides using 3# 20x3s (125* flexion)  Supine/Hooklying L quad stretch 3x30s  L SAQ using 3# x20  L Heel slides using 3# x20  L hip flexion using 3# wt SLR x20  Bridging x20  Side lying hip abduction using 1# x15 (continue & progress as able)   Hip adduction using 1# x20 (continue & progress as able)    To be added:   (if able) Prone hip extension, hamstring curl, and TKE over half bolster    manual therapy techniques: Kinesiology Taping were applied to the: Left knee for 3 minutes, including:  Kinesiology taping to facilitate desensitization of incision utilizing I strip anchored with no tension just distal to distal end of incision.  Applied along incision distal to proximal using 50% tension and oscillating pattern, terminating with no tension just proximal to proximal end of incision. Rubbed tape to activate adhesive  Kinesiology tape applied on contralateral lower extremity (right) for joint space correction utilizing 3 I strips in star pattern for improved gait quality and overall stability   patient stating irritation usually starts the following day after application) using 3 I strips for mechanical correction to L lateral knee joint: with patient in supine position, knee flexed to ~ 80* identified point of pain (over lateral collateral ligament) applied 1st strip by breaking paper at mid point, applying 80% tension to middle of tape and laying tape centered over point of pain with superior tail in line with femur terminating with no tension and inferior tail extending posterior to inferior and slightly inferior terminating near tibial crest with no tension.  2nd I strip applied with 80% tension over center of tape, " centered over most painful point with inferior tail extending along fibula terminating below knee with no tension and superior tail extending posterior to anterior and slightly superiorly terminating with no tension at lateral border of ITB. 3rd strip applied with anchor between the inferior tails of other 2 strips with no tension. Applied inferior to superior with 75% tension, turning at knee to continue proximally along femur, terminating with no tension near mid point of ITB.  Rubbed tape to activate adhesive.      supervised modalities after being cleared for contradictions: IFC Electrical Stimulation:  Lolly received IFC Electrical Stimulation for pain control applied to the L knee. Pt received stimulation at 100 % scan at a frequency of 4000 Hz (beat  Hz) for 0 minutes. Lolly tolderated treatment well without any adverse effects.      cold pack for 0 minutes to L knee.     Patient Education and Home Exercises     Home Exercises Provided and Patient Education Provided     Education provided:   - Verbal cues for exercise technique provided.    Written Home Exercises Provided: Instructed patient to continue prior HEP as tolerated, pending MD appointment. Exercises were reviewed and Lolly was able to demonstrate them prior to the end of the session. Lolly demonstrated good  understanding of the education provided. See EMR under Patient Instructions for exercises provided during therapy sessions.    ASSESSMENT     The patient presents to PT today with primary complaints of R knee pain (non-operative side) and being a little off balance.  She completed exercise program with intermittent, short breaks due to complaints of R knee pain.  She demonstrates adequate L knee ROM but residual weakness in L knee/hip.  This contributes to continued struggle w/step up/over as she is fearful of stability of R knee when accepting weight onto R LE during step downs. Nearing discharge    Lolly Is progressing well towards her  "goals.   Pt prognosis is Good.     Pt will continue to benefit from skilled outpatient physical therapy to address the deficits listed in the problem list box on initial evaluation, provide pt/family education and to maximize pt's level of independence in the home and community environment.     Pt's spiritual, cultural and educational needs considered and pt agreeable to plan of care and goals.     Anticipated barriers to physical therapy: Patient has severe arthritis of R knee which could interfere with therapy process.       Goals:  Short Term Goals: 3 weeks   1) Facilitate normalized scar mobility Met 1/17/2024  2) Facilitate resolution of tenderness around scar Progressing/nearly met  3) Improve AROM L knee to 0*-115* to assist with sit <> stand transfers Met 12/21/2023  4) Patient will safely ambulate over level surfaces with no more than straight cane Progressing/nearly met (AD for community ambulation)     Long Term Goals: 6 weeks   1) Resolve gait deviations Progressing  2) Patient will safely navigate at least 6" steps utilizing L LE as power leg without increased L knee discomfort Progressing  3) Patient will consistently perform sit <> stand transfers demonstrating equal weight bearing and with no more than minimal UE support Slowly progressing  4) Increase walking tolerance to > 30 min to allow for completion of grocery shopping Met 1/17/2024  5) Improve functional score to > 65% as indicated by FOTO knee survey Progressing/Nearly met  6) Patient will be independent in HEP. Progressing    PLAN   POC: 2 times weekly for 6 weeks to include the following interventions: Electrical Stimulation NMES, Gait Training, Manual Therapy, Moist Heat/ Ice, Patient Education, Therapeutic Activities, and Therapeutic Exercise. Lolly may be treated by PTA as part of their rehab team.     The patient is to be progressed within the established plan of care as tolerated in order to accomplish goals as stated above. Prepare " for discharge.     Lolly Villafana, PT

## 2025-03-07 ENCOUNTER — PATIENT MESSAGE (OUTPATIENT)
Dept: TRANSPLANT | Facility: CLINIC | Age: 25
End: 2025-03-07
Payer: MEDICAID

## 2025-03-07 DIAGNOSIS — E83.42 HYPOMAGNESEMIA: Primary | ICD-10-CM

## 2025-03-07 DIAGNOSIS — Z94.1 HEART REPLACED BY TRANSPLANT: ICD-10-CM

## 2025-03-07 RX ORDER — TACROLIMUS 1 MG/1
1 CAPSULE ORAL EVERY 12 HOURS
Qty: 120 CAPSULE | Refills: 11 | Status: SHIPPED | OUTPATIENT
Start: 2025-03-07

## 2025-03-07 RX ORDER — LANOLIN ALCOHOL/MO/W.PET/CERES
400 CREAM (GRAM) TOPICAL 2 TIMES DAILY
Qty: 60 TABLET | Refills: 10 | Status: SHIPPED | OUTPATIENT
Start: 2025-03-07

## 2025-03-08 ENCOUNTER — NURSE TRIAGE (OUTPATIENT)
Dept: ADMINISTRATIVE | Facility: CLINIC | Age: 25
End: 2025-03-08
Payer: MEDICAID

## 2025-03-08 NOTE — TELEPHONE ENCOUNTER
Pt just left the ER and they said that the pt has bronchitis, wants to make sure he can take the medications that they gave him. Transferred to [facility]  to be transferred to appropriate Care Team. Pt verbalized understanding.         Reason for Disposition   Health Information question, no triage required and triager able to answer question    Protocols used: Information Only Call-A-AH

## 2025-03-09 RX ORDER — PREDNISONE 5 MG/1
5 TABLET ORAL
Qty: 90 TABLET | Refills: 11 | Status: SHIPPED | OUTPATIENT
Start: 2025-03-09

## 2025-03-09 RX ORDER — TACROLIMUS 1 MG/1
CAPSULE ORAL
Qty: 120 CAPSULE | Refills: 11 | Status: SHIPPED | OUTPATIENT
Start: 2025-03-09

## 2025-03-09 RX ORDER — PRAVASTATIN SODIUM 40 MG/1
40 TABLET ORAL
Qty: 90 TABLET | Refills: 11 | Status: SHIPPED | OUTPATIENT
Start: 2025-03-09

## 2025-03-10 RX ORDER — LANOLIN ALCOHOL/MO/W.PET/CERES
1 CREAM (GRAM) TOPICAL 2 TIMES DAILY
Qty: 60 TABLET | Refills: 10 | Status: SHIPPED | OUTPATIENT
Start: 2025-03-10

## 2025-03-13 ENCOUNTER — TELEPHONE (OUTPATIENT)
Dept: TRANSPLANT | Facility: CLINIC | Age: 25
End: 2025-03-13
Payer: MEDICAID

## 2025-03-13 ENCOUNTER — PATIENT MESSAGE (OUTPATIENT)
Dept: TRANSPLANT | Facility: CLINIC | Age: 25
End: 2025-03-13
Payer: MEDICAID

## 2025-03-13 NOTE — TELEPHONE ENCOUNTER
Called and spoke with MD at below Number for the Peer to Peer  The insurance doctor would like to discuss after he is seen in clinic due to the Patient has not been seen in clinic since Sept 2023 - 18 months vs 12 months.   WILL NEED TO CALL AFTER SEEN IN CLINIC. I spoke with pt's mother and stated she understood and also reserved a room for them at    They can not Therefore, a Peer to Peer Discussion may be required to process authorization request. Please contact [facility] at [phone].Case #[id number]. Pt is scheduled for 3/24/25 and the Peer to Peer needs to be completed urgently.

## 2025-03-20 ENCOUNTER — TELEPHONE (OUTPATIENT)
Dept: TRANSPLANT | Facility: CLINIC | Age: 25
End: 2025-03-20
Payer: MEDICAID

## 2025-03-20 NOTE — TELEPHONE ENCOUNTER
Reminder call placed to patient regarding appointment in [facility] clinic on 03/20. No answer. Voice message left.

## 2025-03-24 ENCOUNTER — HOSPITAL ENCOUNTER (OUTPATIENT)
Dept: RADIOLOGY | Facility: CLINIC | Age: 25
Discharge: HOME | End: 2025-03-24
Attending: INTERNAL MEDICINE
Payer: MEDICAID

## 2025-03-24 ENCOUNTER — EDUCATION (OUTPATIENT)
Dept: CARDIOLOGY | Facility: CLINIC | Age: 25
End: 2025-03-24
Payer: MEDICAID

## 2025-03-24 ENCOUNTER — HOSPITAL ENCOUNTER (OUTPATIENT)
Dept: RADIOLOGY | Facility: HOSPITAL | Age: 25
Discharge: HOME | End: 2025-03-24
Attending: INTERNAL MEDICINE
Payer: MEDICAID

## 2025-03-24 ENCOUNTER — OFFICE VISIT (OUTPATIENT)
Dept: TRANSPLANT | Facility: CLINIC | Age: 25
End: 2025-03-24
Payer: MEDICAID

## 2025-03-24 ENCOUNTER — TELEPHONE (OUTPATIENT)
Dept: TRANSPLANT | Facility: CLINIC | Age: 25
End: 2025-03-24

## 2025-03-24 ENCOUNTER — OFFICE VISIT (OUTPATIENT)
Dept: CARDIOLOGY | Facility: CLINIC | Age: 25
End: 2025-03-24
Payer: MEDICAID

## 2025-03-24 ENCOUNTER — RESULTS FOLLOW-UP (OUTPATIENT)
Dept: TRANSPLANT | Facility: CLINIC | Age: 25
End: 2025-03-24

## 2025-03-24 VITALS
DIASTOLIC BLOOD PRESSURE: 77 MMHG | SYSTOLIC BLOOD PRESSURE: 113 MMHG | HEIGHT: 70 IN | WEIGHT: 168 LBS | HEART RATE: 129 BPM | OXYGEN SATURATION: 97 % | BODY MASS INDEX: 24.05 KG/M2

## 2025-03-24 VITALS
DIASTOLIC BLOOD PRESSURE: 74 MMHG | SYSTOLIC BLOOD PRESSURE: 132 MMHG | WEIGHT: 168 LBS | HEART RATE: 133 BPM | BODY MASS INDEX: 24.05 KG/M2 | HEIGHT: 70 IN | OXYGEN SATURATION: 98 %

## 2025-03-24 DIAGNOSIS — Z79.52 LONG TERM CURRENT USE OF SYSTEMIC STEROIDS: ICD-10-CM

## 2025-03-24 DIAGNOSIS — Z94.1 STATUS POST HEART TRANSPLANT: ICD-10-CM

## 2025-03-24 DIAGNOSIS — Z94.1 HEART TRANSPLANTED: Primary | ICD-10-CM

## 2025-03-24 DIAGNOSIS — Z79.899 ENCOUNTER FOR LONG-TERM (CURRENT) USE OF MEDICATIONS: ICD-10-CM

## 2025-03-24 DIAGNOSIS — Z91.148 NONCOMPLIANCE WITH MEDICATIONS: ICD-10-CM

## 2025-03-24 DIAGNOSIS — Z94.1 S/P ORTHOTOPIC HEART TRANSPLANT: Primary | ICD-10-CM

## 2025-03-24 DIAGNOSIS — D84.821 IMMUNOSUPPRESSION DUE TO DRUG THERAPY: ICD-10-CM

## 2025-03-24 DIAGNOSIS — Z94.1 S/P ORTHOTOPIC HEART TRANSPLANT: ICD-10-CM

## 2025-03-24 DIAGNOSIS — I10 ESSENTIAL HYPERTENSION: ICD-10-CM

## 2025-03-24 DIAGNOSIS — E78.49 OTHER HYPERLIPIDEMIA: ICD-10-CM

## 2025-03-24 DIAGNOSIS — Z79.899 IMMUNOSUPPRESSION DUE TO DRUG THERAPY: ICD-10-CM

## 2025-03-24 DIAGNOSIS — Z94.1 HEART REPLACED BY TRANSPLANT: ICD-10-CM

## 2025-03-24 PROCEDURE — 3008F BODY MASS INDEX DOCD: CPT | Mod: CPTII,,, | Performed by: INTERNAL MEDICINE

## 2025-03-24 PROCEDURE — 71046 X-RAY EXAM CHEST 2 VIEWS: CPT | Mod: TC,FY

## 2025-03-24 PROCEDURE — 77080 DXA BONE DENSITY AXIAL: CPT | Mod: TC

## 2025-03-24 PROCEDURE — 3075F SYST BP GE 130 - 139MM HG: CPT | Mod: CPTII,,, | Performed by: INTERNAL MEDICINE

## 2025-03-24 PROCEDURE — 99213 OFFICE O/P EST LOW 20 MIN: CPT | Mod: PBBFAC,25,27 | Performed by: INTERNAL MEDICINE

## 2025-03-24 PROCEDURE — 1159F MED LIST DOCD IN RCRD: CPT | Mod: CPTII,,, | Performed by: INTERNAL MEDICINE

## 2025-03-24 PROCEDURE — 77080 DXA BONE DENSITY AXIAL: CPT | Mod: 26,,, | Performed by: INTERNAL MEDICINE

## 2025-03-24 PROCEDURE — 99205 OFFICE O/P NEW HI 60 MIN: CPT | Mod: S$PBB,,, | Performed by: INTERNAL MEDICINE

## 2025-03-24 PROCEDURE — 3044F HG A1C LEVEL LT 7.0%: CPT | Mod: CPTII,,, | Performed by: INTERNAL MEDICINE

## 2025-03-24 PROCEDURE — 3078F DIAST BP <80 MM HG: CPT | Mod: CPTII,,, | Performed by: INTERNAL MEDICINE

## 2025-03-24 PROCEDURE — 99999 PR PBB SHADOW E&M-EST. PATIENT-LVL IV: CPT | Mod: PBBFAC,,, | Performed by: INTERNAL MEDICINE

## 2025-03-24 PROCEDURE — 99999 PR PBB SHADOW E&M-EST. PATIENT-LVL III: CPT | Mod: PBBFAC,,, | Performed by: INTERNAL MEDICINE

## 2025-03-24 PROCEDURE — 71046 X-RAY EXAM CHEST 2 VIEWS: CPT | Mod: 26,,, | Performed by: RADIOLOGY

## 2025-03-24 PROCEDURE — 99214 OFFICE O/P EST MOD 30 MIN: CPT | Mod: PBBFAC,25 | Performed by: INTERNAL MEDICINE

## 2025-03-24 RX ORDER — ALBUTEROL SULFATE 90 UG/1
2 INHALANT RESPIRATORY (INHALATION) EVERY 4 HOURS PRN
COMMUNITY
Start: 2025-03-08

## 2025-03-24 NOTE — PROGRESS NOTES
OUTPATIENT CATHETERIZATION INSTRUCTIONS    You have been scheduled for a procedure in the catheterization lab on Tuesday, March 25, 2025.     Please report to the Cardiology Waiting Area on the Third floor of the hospital and check in at 7:30AM.  You will then be taken to the SSCU (Short Stay Cardiac Unit) and prepared for your procedure. Please be aware that this is not the time of your procedure but the time you are to arrive. The procedures are scheduled on an hourly basis; however, emergency cases take precedence over all other cases.       Nothing to eat after MIDNIGHT 12 AM You may have clear liquids until the time of your admission which should be 2 hours prior to your procedure.          You are encouraged to drink at least 16 ounces of clear liquids prior to your admission to SSCU.          Clear liquids include water, black coffee, clear juices, and performance drinks - no pulp or milk.         .    2.   You may take your regular morning medications with water.       3.   Hold the following medications prior to your procedure:NONE               The procedure will take 1-2 hours to perform. During the procedure you will receive IV sedation administered by a nurse.  Most patients will sleep through procedure.  After the procedure, you will either return to SSCU or spend some time in the cath lab recovery room.  If appropriate, your family will be able to stay with you during this time.      You should be discharged home that same day if you are stable and there are no complications.  Your doctor will determine whether you are discharged or kept overnight  based on your particular procedure and time that procedure is completed.   The results of your procedure will be discussed with you before you are discharged.     YOU WILL NOT BE ABLE TO DRIVE YOURSELF HOME.  SOMEONE MUST BE ABLE TO DRIVE YOU HOME FROM HOSPITAL.       If you should have any questions, concerns, or need to change the date of your procedure,  please call  KRIS Canseco @ [phone].      INSTRUCTIONS WERE GIVEN TO THE PATIENT VERBALLY AND THEY VERBALIZED UNDERSTANDING.  THEY DO NOT REQUIRE ANY SPECIAL NEEDS AND DO NOT HAVE ANY LEARNING BARRIERS.                      Directions for Reporting to Cardiology Waiting Area in the Hospital  If you park in the Parking Garage:  Take elevators to the1st floor of the parking garage.  Continue past the gift shop, coffee shop, and piano.  Take a right and go to the gold elevators. (Elevator B)  Take the elevator to the 3rd floor.  Follow the arrow on the sign on the wall that says Cath Lab Registration/EP Lab Registration.  Follow the long hallway all the way around until you come to a big open area.  This is the registration area.  Check in at Reception Desk.    OR    If family is dropping you off:  Have them drop you off at the front of the Hospital under the green overhang.  Enter through the doors and take a right.  Take the E elevators to the 3rd floor Cardiology Waiting Area.  Check in at the Reception Desk in the waiting room.                Insurance Authorization/Personal Assistance    The [facility] pre-service team will submit information to your insurance carrier for authorization.  Please note that we do not handle any insurance issues in our office.  If there are any financial obligations, such as co-pays, deductibles, or out of pocket fees, you will be contacted by a representative prior to your procedure.  It is the patient's responsibility to assure that their procedure is cleared in advance.  Our expert financial counselors are available to provide  patients with assistance for personalized cost estimates.  Financial counselors can be reached the following ways:    Pre Service Representatives:  [phone]  Financial Counselors:  [phone]  [employer] messaging - accessed via the patient portal  Live chat accessed through [email or web address]      FMLA/Disability Forms    ALL FMLA/Disability  "forms and claims are  processed through the Disability office.  All claims must be initiated through this office.  Once the forms are completed, they will be sent to the physician for signature.  Please allow 10 - 14 working days to have forms completed and returned to you.   You may reach this office in  the following ways:      Email:  [email or web address]  Fax:  [phone]  Phone:  [phone]    Release of Information and Medical Records      Our office will send a copy of any office notes or procedure notes to your referring provider.   ALL other medical records are released through the Medical Records office.  Phone:  [phone]  Fax:  [phone]    Miscellaneous Information    On-line information:  [email or web address]  Parking at [facility] is free and open 24/7   parking is located on the first floor of the garage and is available for a small fee Monday - Friday from 6a - 6 pm  [facility] located at [facility] campus:  [phone], or [email or web address]  If you need assistance with setting up or troubleshooting "MyChart" please call [phone]                        "

## 2025-03-24 NOTE — PROGRESS NOTES
Subjective:    Patient ID:  Wilmer Aponte is a 24 y.o. male who presents for evaluation of CAD      Referring physician: Dr. Junie Diaz     HPI  22 YO M w/  OHT secondary to DCM (donor CMV+/recipient -) 9/29/14 at [place] c/b 2R rejection history  treated with steroid pulse 10/23/14 followed by repeat 2R treated with anti thymocyte globulin and 3 days of solumedrol 11/12/14 (1R on biopsies 1/8/15, 2/23/15, 4/13/15, 10/22/15, 8/2/16, Grade 0 biopsy with normal coronary angiography March 2017, March 2019) with history of reported of nonadherence (previously was on clotrimazole devang to stabilize tacro levels in past but stopped), history of NSVT 10/15/2014, HTN, dyslipidemia. Had another episode of ACR 2R and pAMR2 with LVEF drop to 25%, in June 2021, for which he was treated with IV steroids, PLEX, IV IG and tiruximab .  He was referred by heart transplant today for surveillance angiogram.  Patient currently denies having any chest pain, shortness a breath at rest or on exertion.  He also denies having any palpitations, presyncope, syncope.  He had stress echo back in 2023 that showed normal ejection fraction with no ischemia      Past Medical History:   Diagnosis Date    Anxiety     Dilated cardiomyopathy     13 y/o    Encounter for blood transfusion     Gastrocutaneous fistula due to gastrostomy tube     Heart transplanted     9/29/14 at [place].  Donor CMV+/R-.  [place] [facility] 9/23-29.    Hypertension     Oral aversion     Status post orthotopic heart transplant        Past Surgical History:   Procedure Laterality Date    BIOPSY WITH ULTRASOUND GUIDANCE Right 10/26/2021    Procedure: BIOPSY, WITH US GUIDANCE;  Surgeon: Kirsty Ward MD;  Location: [facility] C[facility] LAB;  Service: Cardiology;  Laterality: Right;    COMBINED RIGHT AND RETROGRADE LEFT HEART CATHETERIZATION FOR CONGENITAL HEART DEFECT N/A 4/22/2021    Procedure: CATHETERIZATION, HEART, COMBINED RIGHT AND RETROGRADE LEFT, FOR CONGENITAL HEART DEFECT;   "Surgeon: Jose Burns Jr., MD;  Location: [facility] CATH LAB;  Service: Cardiology;  Laterality: N/A;  Pedi Heart    ESOPHAGOGASTRODUODENOSCOPY N/A 7/20/2021    Procedure: EGD (ESOPHAGOGASTRODUODENOSCOPY);  Surgeon: Nacho Martínez MD;  Location: [facility] ENDO (2ND FLR);  Service: Endoscopy;  Laterality: N/A;    EXTRACORPOREAL CIRCULATION      GASTROSTOMY TUBE PLACEMENT      HEART TRANSPLANT      LEFT VENTRICULAR ASSIST DEVICE      RIGHT HEART CATHETERIZATION Right 6/18/2021    Procedure: INSERTION, CATHETER, RIGHT HEART;  Surgeon: Mg Monroy MD;  Location: [facility] CATH LAB;  Service: Cardiology;  Laterality: Right;       Medications Ordered Prior to Encounter[1]    Review of patient's allergies indicates:   Allergen Reactions    Clindamycin Anaphylaxis       Social History[2]    Family History   Problem Relation Name Age of Onset    Diabetes type II Mother      Dilated cardiomyopathy Father      Dilated cardiomyopathy Paternal Uncle      Congenital heart disease Neg Hx      Early death Neg Hx           Review of Systems   Constitutional: Negative.   HENT: Negative.     Eyes: Negative.    Cardiovascular: Negative.    Respiratory: Negative.     Endocrine: Negative.    Skin: Negative.    Musculoskeletal: Negative.    Gastrointestinal: Negative.    Neurological: Negative.         Objective:     Vitals:    03/24/25 1042 03/24/25 1043   BP: (!) 145/87 132/74   BP Location: Right arm Left arm   Patient Position: Sitting Sitting   Pulse: (!) 138 (!) 133   SpO2: 98% 98%   Weight: 76.2 kg (167 lb 15.9 oz)    Height: 5' 10" (1.778 m)         Physical Exam  Constitutional:       Appearance: Normal appearance.   HENT:      Head: Normocephalic and atraumatic.   Eyes:      Extraocular Movements: Extraocular movements intact.      Pupils: Pupils are equal, round, and reactive to light.   Cardiovascular:      Rate and Rhythm: Normal rate and regular rhythm.   Pulmonary:      Effort: Pulmonary effort is normal.      Breath sounds: " Normal breath sounds.   Abdominal:      General: Abdomen is flat. Bowel sounds are normal.      Palpations: Abdomen is soft.   Musculoskeletal:         General: Normal range of motion.      Cervical back: Normal range of motion.   Skin:     General: Skin is warm.      Capillary Refill: Capillary refill takes less than 2 seconds.   Neurological:      General: No focal deficit present.      Mental Status: He is alert and oriented to person, place, and time.           Assessmen/Plan   S/P orthotopic heart transplant  Here for surveillance angiogram    --[facility] +/- PCI, patient is a KELVIN candidate  - Anti-platelet Therapy: ASA   - Access: Right radial  - Catheters: José Miguel  - Creatinine/CrCl: 0.9  - Allergies: No shellfish / Iodine allergy  - Pre-Hydration: NS  - Pre-Op Med: Bendaryl 50mg pO   - All patient's questions were answered.  -The risks, benefits and alternatives of the procedure were explained to the patient.   -The risks of coronary angiography include but are not limited to: bleeding, infection, heart rhythm abnormalities, allergic reactions, kidney injury and potential need for dialysis, stroke and death.   - Should stenting be indicated, the patient has agreed to dual anti-platelet therapy for 1-consecutive year with a drug-eluting stent and a minimum of 1-month with the use of a bare metal stent  - Additionally, pt is aware that non-compliance is likely to result in stent clotting with heart attack, heart failure, and/or death  -The risks of moderate sedation include hypotension, respiratory depression, arrhythmias, bronchospasm, and death.   - Informed consent was obtained and the  patient is agreeable to proceed with the procedure.      Other hyperlipidemia  Continue pravastatin      Abel Hendrix                [1]   Current Outpatient Medications on File Prior to Visit   Medication Sig Dispense Refill    aspirin 81 MG Chew Take 1 tablet (81 mg total) by mouth once daily. 30 tablet 11    cholecalciferol,  vitamin D3, (VITAMIN D3) 25 mcg (1,000 unit) capsule Take 1 capsule (1,000 Units total) by mouth once daily. 30 capsule 11    magnesium oxide (MAG-OX) 400 mg (241.3 mg magnesium) tablet TAKE 1 TABLET BY MOUTH TWICE DAILY 60 tablet 10    magnesium oxide (MAG-OX) 400 mg (241.3 mg magnesium) tablet Take 1 tablet (400 mg total) by mouth 2 (two) times daily. 60 tablet 10    mycophenolate mofetil (CELLCEPT) 200 mg/mL SusR TAKE 7.5MLS BY MOUTH TWICE DAILY 480 mL 11    pravastatin (PRAVACHOL) 40 MG tablet TAKE 1 TABLET BY MOUTH ONCE DAILY 90 tablet 11    predniSONE (DELTASONE) 5 MG tablet TAKE 1 TABLET BY MOUTH ONCE DAILY 90 tablet 11    sulfamethoxazole-trimethoprim 400-80mg (BACTRIM,SEPTRA) 400-80 mg per tablet TAKE 1 TABLET BY MOUTH EVERY DAY 90 tablet 2    tacrolimus (PROGRAF) 1 MG Cap TAKE 1 CAPSULE BY MOUTH EVERY TWELVE HOURS 120 capsule 11    tacrolimus (PROGRAF) 1 MG Cap Take 1 capsule (1 mg total) by mouth every 12 (twelve) hours. Take sublingual 120 capsule 11     Current Facility-Administered Medications on File Prior to Visit   Medication Dose Route Frequency Provider Last Rate Last Admin    sodium chloride 0.9% flush 10 mL  10 mL Intravenous PRN Pranav Valles MD       [2]   Social History  Tobacco Use    Smoking status: Never    Smokeless tobacco: Never   Substance Use Topics    Alcohol use: No    Drug use: No

## 2025-03-24 NOTE — TELEPHONE ENCOUNTER
Reason for visit:  10 year annual post heart transplant clinic visit.     Immunosuppression:  Tacrolimus 1 mg bid goal 5-10  MMF 15OO MG bid  Pred 5 mg daily      Assessment: no symptoms    Psychosocial Evaluation:  -support:Mother  -issues/concerns:Unable to get the Echocardiogram approved through his insurance at this time, we do an appeal      Education:   Medications, lab and echo results reviewed with patient and Dr. Gore. See MD note for orders and recommendations    Follow up tests results and schedule echo   Possibly in [place] - a little closer for the patient and his mother to travel.

## 2025-03-24 NOTE — ASSESSMENT & PLAN NOTE
Here for surveillance angiogram    --[facility] +/- PCI, patient is a KELVIN candidate  - Anti-platelet Therapy: ASA   - Access: Right radial  - Catheters: José Miguel  - Creatinine/CrCl: 0.9  - Allergies: No shellfish / Iodine allergy  - Pre-Hydration: NS  - Pre-Op Med: Bendaryl 50mg pO   - All patient's questions were answered.  -The risks, benefits and alternatives of the procedure were explained to the patient.   -The risks of coronary angiography include but are not limited to: bleeding, infection, heart rhythm abnormalities, allergic reactions, kidney injury and potential need for dialysis, stroke and death.   - Should stenting be indicated, the patient has agreed to dual anti-platelet therapy for 1-consecutive year with a drug-eluting stent and a minimum of 1-month with the use of a bare metal stent  - Additionally, pt is aware that non-compliance is likely to result in stent clotting with heart attack, heart failure, and/or death  -The risks of moderate sedation include hypotension, respiratory depression, arrhythmias, bronchospasm, and death.   - Informed consent was obtained and the  patient is agreeable to proceed with the procedure.

## 2025-03-24 NOTE — H&P (VIEW-ONLY)
Subjective:    Patient ID:  Wilmer Aponte is a 24 y.o. male who presents for evaluation of CAD      Referring physician: Dr. 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 Had another episode of ACR 2R and pAMR2 with LVEF drop to 25%, in June 2021, for which he was treated with IV steroids, PLEX, IV IG and tiruximab .  He was referred by heart transplant today for surveillance angiogram.  Patient currently denies having any chest pain, shortness a breath at rest or on exertion.  He also denies having any palpitations, presyncope, syncope.  He had stress echo back in 2023 that showed normal ejection fraction with no ischemia      Past Medical History:   Diagnosis Date    Anxiety     Dilated cardiomyopathy     15 y/o    Encounter for blood transfusion     Gastrocutaneous fistula due to gastrostomy tube     Heart transplanted     9/29/14 at [place].  Donor CMV+/R-.  [place] BiVAD 9/23-29.    Hypertension     Oral aversion     Status post orthotopic heart transplant        Past Surgical History:   Procedure Laterality Date    BIOPSY WITH ULTRASOUND GUIDANCE Right 10/26/2021    Procedure: BIOPSY, WITH US GUIDANCE;  Surgeon: Kirsty Ward MD;  Location: [facility] CATH LAB;  Service: Cardiology;  Laterality: Right;    COMBINED RIGHT AND RETROGRADE LEFT HEART CATHETERIZATION FOR CONGENITAL HEART DEFECT N/A 4/22/2021    Procedure: CATHETERIZATION, HEART, COMBINED RIGHT AND RETROGRADE LEFT, FOR CONGENITAL HEART DEFECT;   "Surgeon: Jose Burns Jr., MD;  Location: [facility] CATH LAB;  Service: Cardiology;  Laterality: N/A;  Pedi Heart    ESOPHAGOGASTRODUODENOSCOPY N/A 7/20/2021    Procedure: EGD (ESOPHAGOGASTRODUODENOSCOPY);  Surgeon: Nacho Martínez MD;  Location: [facility] ENDO (2ND FLR);  Service: Endoscopy;  Laterality: N/A;    EXTRACORPOREAL CIRCULATION      GASTROSTOMY TUBE PLACEMENT      HEART TRANSPLANT      LEFT VENTRICULAR ASSIST DEVICE      RIGHT HEART CATHETERIZATION Right 6/18/2021    Procedure: INSERTION, CATHETER, RIGHT HEART;  Surgeon: Mg Monroy MD;  Location: [facility] CATH LAB;  Service: Cardiology;  Laterality: Right;       Medications Ordered Prior to Encounter[1]    Review of patient's allergies indicates:   Allergen Reactions    Clindamycin Anaphylaxis       Social History[2]    Family History   Problem Relation Name Age of Onset    Diabetes type II Mother      Dilated cardiomyopathy Father      Dilated cardiomyopathy Paternal Uncle      Congenital heart disease Neg Hx      Early death Neg Hx           Review of Systems   Constitutional: Negative.   HENT: Negative.     Eyes: Negative.    Cardiovascular: Negative.    Respiratory: Negative.     Endocrine: Negative.    Skin: Negative.    Musculoskeletal: Negative.    Gastrointestinal: Negative.    Neurological: Negative.         Objective:     Vitals:    03/24/25 1042 03/24/25 1043   BP: (!) 145/87 132/74   BP Location: Right arm Left arm   Patient Position: Sitting Sitting   Pulse: (!) 138 (!) 133   SpO2: 98% 98%   Weight: 76.2 kg (167 lb 15.9 oz)    Height: 5' 10" (1.778 m)         Physical Exam  Constitutional:       Appearance: Normal appearance.   HENT:      Head: Normocephalic and atraumatic.   Eyes:      Extraocular Movements: Extraocular movements intact.      Pupils: Pupils are equal, round, and reactive to light.   Cardiovascular:      Rate and Rhythm: Normal rate and regular rhythm.   Pulmonary:      Effort: Pulmonary effort is normal.      Breath sounds: " Normal breath sounds.   Abdominal:      General: Abdomen is flat. Bowel sounds are normal.      Palpations: Abdomen is soft.   Musculoskeletal:         General: Normal range of motion.      Cervical back: Normal range of motion.   Skin:     General: Skin is warm.      Capillary Refill: Capillary refill takes less than 2 seconds.   Neurological:      General: No focal deficit present.      Mental Status: He is alert and oriented to person, place, and time.           Assessmen/Plan   S/P orthotopic heart transplant  Here for surveillance angiogram    --LHC +/- PCI, patient is a KELVIN candidate  - Anti-platelet Therapy: ASA   - Access: Right radial  - Catheters: José Miguel  - Creatinine/CrCl: 0.9  - Allergies: No shellfish / Iodine allergy  - Pre-Hydration: NS  - Pre-Op Med: Bendaryl 50mg pO   - All patient's questions were answered.  -The risks, benefits and alternatives of the procedure were explained to the patient.   -The risks of coronary angiography include but are not limited to: bleeding, infection, heart rhythm abnormalities, allergic reactions, kidney injury and potential need for dialysis, stroke and death.   - Should stenting be indicated, the patient has agreed to dual anti-platelet therapy for 1-consecutive year with a drug-eluting stent and a minimum of 1-month with the use of a bare metal stent  - Additionally, pt is aware that non-compliance is likely to result in stent clotting with heart attack, heart failure, and/or death  -The risks of moderate sedation include hypotension, respiratory depression, arrhythmias, bronchospasm, and death.   - Informed consent was obtained and the  patient is agreeable to proceed with the procedure.      Other hyperlipidemia  Continue pravastatin      Abel Hendrix                [1]   Current Outpatient Medications on File Prior to Visit   Medication Sig Dispense Refill    aspirin 81 MG Chew Take 1 tablet (81 mg total) by mouth once daily. 30 tablet 11    cholecalciferol,  vitamin D3, (VITAMIN D3) 25 mcg (1,000 unit) capsule Take 1 capsule (1,000 Units total) by mouth once daily. 30 capsule 11    magnesium oxide (MAG-OX) 400 mg (241.3 mg magnesium) tablet TAKE 1 TABLET BY MOUTH TWICE DAILY 60 tablet 10    magnesium oxide (MAG-OX) 400 mg (241.3 mg magnesium) tablet Take 1 tablet (400 mg total) by mouth 2 (two) times daily. 60 tablet 10    mycophenolate mofetil (CELLCEPT) 200 mg/mL SusR TAKE 7.5MLS BY MOUTH TWICE DAILY 480 mL 11    pravastatin (PRAVACHOL) 40 MG tablet TAKE 1 TABLET BY MOUTH ONCE DAILY 90 tablet 11    predniSONE (DELTASONE) 5 MG tablet TAKE 1 TABLET BY MOUTH ONCE DAILY 90 tablet 11    sulfamethoxazole-trimethoprim 400-80mg (BACTRIM,SEPTRA) 400-80 mg per tablet TAKE 1 TABLET BY MOUTH EVERY DAY 90 tablet 2    tacrolimus (PROGRAF) 1 MG Cap TAKE 1 CAPSULE BY MOUTH EVERY TWELVE HOURS 120 capsule 11    tacrolimus (PROGRAF) 1 MG Cap Take 1 capsule (1 mg total) by mouth every 12 (twelve) hours. Take sublingual 120 capsule 11     Current Facility-Administered Medications on File Prior to Visit   Medication Dose Route Frequency Provider Last Rate Last Admin    sodium chloride 0.9% flush 10 mL  10 mL Intravenous PRN Pranav Valles MD       [2]   Social History  Tobacco Use    Smoking status: Never    Smokeless tobacco: Never   Substance Use Topics    Alcohol use: No    Drug use: No

## 2025-03-25 ENCOUNTER — HOSPITAL ENCOUNTER (OUTPATIENT)
Facility: HOSPITAL | Age: 25
Discharge: HOME | End: 2025-03-25
Attending: INTERNAL MEDICINE | Admitting: INTERNAL MEDICINE
Payer: MEDICAID

## 2025-03-25 VITALS
HEIGHT: 70 IN | DIASTOLIC BLOOD PRESSURE: 90 MMHG | WEIGHT: 167 LBS | BODY MASS INDEX: 23.91 KG/M2 | SYSTOLIC BLOOD PRESSURE: 135 MMHG | TEMPERATURE: 98 F | OXYGEN SATURATION: 97 % | RESPIRATION RATE: 18 BRPM | HEART RATE: 124 BPM

## 2025-03-25 DIAGNOSIS — Z01.818 PRE-OP TESTING: ICD-10-CM

## 2025-03-25 DIAGNOSIS — R00.0 TACHYCARDIA: ICD-10-CM

## 2025-03-25 DIAGNOSIS — Z94.1 HEART TRANSPLANT RECIPIENT: ICD-10-CM

## 2025-03-25 LAB
INDIRECT COOMBS: NORMAL
OHS QRS DURATION: 100 MS
OHS QRS DURATION: 92 MS
OHS QTC CALCULATION: 436 MS
OHS QTC CALCULATION: 440 MS
RH BLD: NORMAL
SPECIMEN OUTDATE: NORMAL

## 2025-03-25 PROCEDURE — 93458 L HRT ARTERY/VENTRICLE ANGIO: CPT | Performed by: INTERNAL MEDICINE

## 2025-03-25 PROCEDURE — 27201423 OPTIME MED/SURG SUP & DEVICES STERILE SUPPLY: Performed by: INTERNAL MEDICINE

## 2025-03-25 PROCEDURE — C1769 GUIDE WIRE: HCPCS | Performed by: INTERNAL MEDICINE

## 2025-03-25 PROCEDURE — 63600175 PHARM REV CODE 636 W HCPCS: Performed by: INTERNAL MEDICINE

## 2025-03-25 PROCEDURE — 93458 L HRT ARTERY/VENTRICLE ANGIO: CPT | Mod: 26,,, | Performed by: INTERNAL MEDICINE

## 2025-03-25 PROCEDURE — 25500020 PHARM REV CODE 255: Performed by: INTERNAL MEDICINE

## 2025-03-25 PROCEDURE — C1887 CATHETER, GUIDING: HCPCS | Performed by: INTERNAL MEDICINE

## 2025-03-25 PROCEDURE — 86900 BLOOD TYPING SEROLOGIC ABO: CPT | Performed by: INTERNAL MEDICINE

## 2025-03-25 PROCEDURE — 25000003 PHARM REV CODE 250: Performed by: INTERNAL MEDICINE

## 2025-03-25 PROCEDURE — 93005 ELECTROCARDIOGRAM TRACING: CPT

## 2025-03-25 PROCEDURE — 99152 MOD SED SAME PHYS/QHP 5/>YRS: CPT | Performed by: INTERNAL MEDICINE

## 2025-03-25 PROCEDURE — 93010 ELECTROCARDIOGRAM REPORT: CPT | Mod: ,,, | Performed by: INTERNAL MEDICINE

## 2025-03-25 PROCEDURE — 99152 MOD SED SAME PHYS/QHP 5/>YRS: CPT | Mod: ,,, | Performed by: INTERNAL MEDICINE

## 2025-03-25 PROCEDURE — C1894 INTRO/SHEATH, NON-LASER: HCPCS | Performed by: INTERNAL MEDICINE

## 2025-03-25 RX ORDER — SODIUM CHLORIDE 9 MG/ML
INJECTION, SOLUTION INTRAVENOUS CONTINUOUS
Status: ACTIVE | OUTPATIENT
Start: 2025-03-25 | End: 2025-03-25

## 2025-03-25 RX ORDER — LIDOCAINE HYDROCHLORIDE 20 MG/ML
INJECTION, SOLUTION EPIDURAL; INFILTRATION; INTRACAUDAL; PERINEURAL
Status: DISCONTINUED | OUTPATIENT
Start: 2025-03-25 | End: 2025-03-25 | Stop reason: HOSPADM

## 2025-03-25 RX ORDER — HEPARIN SOD,PORCINE/0.9 % NACL 1000/500ML
INTRAVENOUS SOLUTION INTRAVENOUS
Status: DISCONTINUED | OUTPATIENT
Start: 2025-03-25 | End: 2025-03-25 | Stop reason: HOSPADM

## 2025-03-25 RX ORDER — DIPHENHYDRAMINE HCL 50 MG
50 CAPSULE ORAL ONCE
Status: DISCONTINUED | OUTPATIENT
Start: 2025-03-25 | End: 2025-03-25

## 2025-03-25 RX ORDER — ACETAMINOPHEN 650 MG/20.3ML
650 LIQUID ORAL EVERY 4 HOURS PRN
Status: DISCONTINUED | OUTPATIENT
Start: 2025-03-25 | End: 2025-03-25 | Stop reason: HOSPADM

## 2025-03-25 RX ORDER — DIPHENHYDRAMINE HYDROCHLORIDE 50 MG/ML
25 INJECTION, SOLUTION INTRAMUSCULAR; INTRAVENOUS ONCE
Status: COMPLETED | OUTPATIENT
Start: 2025-03-25 | End: 2025-03-25

## 2025-03-25 RX ORDER — HEPARIN SODIUM 1000 [USP'U]/ML
INJECTION, SOLUTION INTRAVENOUS; SUBCUTANEOUS
Status: DISCONTINUED | OUTPATIENT
Start: 2025-03-25 | End: 2025-03-25 | Stop reason: HOSPADM

## 2025-03-25 RX ORDER — MIDAZOLAM HYDROCHLORIDE 1 MG/ML
INJECTION INTRAMUSCULAR; INTRAVENOUS
Status: DISCONTINUED | OUTPATIENT
Start: 2025-03-25 | End: 2025-03-25 | Stop reason: HOSPADM

## 2025-03-25 RX ORDER — ACETAMINOPHEN 325 MG/1
650 TABLET ORAL EVERY 4 HOURS PRN
Status: DISCONTINUED | OUTPATIENT
Start: 2025-03-25 | End: 2025-03-25

## 2025-03-25 RX ORDER — ONDANSETRON 8 MG/1
8 TABLET, ORALLY DISINTEGRATING ORAL EVERY 8 HOURS PRN
Status: DISCONTINUED | OUTPATIENT
Start: 2025-03-25 | End: 2025-03-25 | Stop reason: HOSPADM

## 2025-03-25 RX ORDER — LIDOCAINE 50 MG/G
OINTMENT TOPICAL
Status: DISCONTINUED | OUTPATIENT
Start: 2025-03-25 | End: 2025-03-25 | Stop reason: HOSPADM

## 2025-03-25 RX ORDER — FENTANYL CITRATE 50 UG/ML
INJECTION, SOLUTION INTRAMUSCULAR; INTRAVENOUS
Status: DISCONTINUED | OUTPATIENT
Start: 2025-03-25 | End: 2025-03-25 | Stop reason: HOSPADM

## 2025-03-25 RX ORDER — NITROGLYCERIN 20 MG/100ML
INJECTION INTRAVENOUS
Status: DISCONTINUED | OUTPATIENT
Start: 2025-03-25 | End: 2025-03-25 | Stop reason: HOSPADM

## 2025-03-25 RX ADMIN — ACETAMINOPHEN 650 MG: 650 SOLUTION ORAL at 12:03

## 2025-03-25 RX ADMIN — SODIUM CHLORIDE: 9 INJECTION, SOLUTION INTRAVENOUS at 07:03

## 2025-03-25 RX ADMIN — DIPHENHYDRAMINE HYDROCHLORIDE 25 MG: 50 INJECTION, SOLUTION INTRAMUSCULAR; INTRAVENOUS at 08:03

## 2025-03-25 NOTE — Clinical Note
The wire was removed from the aorta. Abormal VS: Temp > 100F or < 96.8F; SBP < 90 mmHG; HR > 120bpm; Resp > 24/min

## 2025-03-25 NOTE — Clinical Note
The catheter was removed from the ostium   right coronary artery. An angiography was performed of the right coronary arteries. Multiple views were taken.

## 2025-03-25 NOTE — INTERVAL H&P NOTE
The patient has been examined and the H&P has been reviewed:    I concur with the findings and no changes have occurred since H&P was written.    Procedure risks, benefits and alternative options discussed and understood by patient/family.    Plan is for LHC/coronary angiogram - Diagnostic Cath.   - TTE: 60-65%  - Anti-platelet therapy: Aspirin  - Access: R radial  - Creatinine/CrCl: 0.8  - Hemoglobin: 17.3  - Allergies: no contrast allergies.   - Pre-op hydration: 3 cc/kg/hr x 1 hour  - Pre-op med: 25 mg Benadryl IV

## 2025-03-25 NOTE — OP NOTE
Brief Operative Note:    : Pranav Valles MD     Referring Physician: Junie Diaz     All Operators: Surgeon(s):  Abel Hendrix MD Subramaniam, Venkat, MD Tafur Soto, Jose D., MD Obi, Mukosolu., MD    Preoperative Diagnosis: Heart transplant recipient [Z94.1]     Postop Diagnosis: Heart transplant recipient [Z94.1]    Treatments/Procedures: Procedure(s) (LRB):  Angiogram, Coronary, with Left Heart Cath (N/A)    Access: Right radial artery    Findings: See catheterization report for full details.    Intervention: See catheterization report for full details.    Closure device: Vascband       Plan:  - Post cath protocol   - IVF @ 150 cc/kg/hr x 2 hours  - Bed rest x 2 hours   - Continue aspirin 81 mg daily indefinitely  - Continue high intensity statin therapy (LDL goal < 70)  - Risk factor reduction (BP <130/80 mmHg, glycemic control, etc)    Estimated Blood loss: 20 cc    Specimens removed: No    Amelia Segura MD   Interventional Cardiology   Department of Cardiovascular disease   [facility]

## 2025-03-25 NOTE — PLAN OF CARE
MD Obi notified of heart rate reaching 140s while ambulating and 130s resting heart rate. EKG ordered.

## 2025-03-25 NOTE — PLAN OF CARE
Received report from KRIS Hurt. Patient s/p [facility], AAOx3. VSS, no c/o pain or discomfort at this time, resp even and unlabored. Vasc band to R wrist is CDI. No active bleeding. No hematoma noted. Post procedure protocol reviewed with patient and patient's family. Understanding verbalized. Family members at bedside. Nurse call bell within reach. Will continue to monitor per post procedure protocol.

## 2025-04-28 DIAGNOSIS — Z94.1 HEART REPLACED BY TRANSPLANT: ICD-10-CM

## 2025-04-28 DIAGNOSIS — E78.5 HYPERLIPIDEMIA, UNSPECIFIED HYPERLIPIDEMIA TYPE: Primary | ICD-10-CM

## 2025-04-28 RX ORDER — PREDNISONE 5 MG/1
5 TABLET ORAL DAILY
Qty: 30 TABLET | Refills: 11 | Status: SHIPPED | OUTPATIENT
Start: 2025-04-28

## 2025-04-28 RX ORDER — PRAVASTATIN SODIUM 40 MG/1
40 TABLET ORAL NIGHTLY
Qty: 90 TABLET | Refills: 11 | Status: SHIPPED | OUTPATIENT
Start: 2025-04-28

## 2025-04-28 RX ORDER — TACROLIMUS 1 MG/1
1 CAPSULE ORAL EVERY 12 HOURS
Qty: 120 CAPSULE | Refills: 11 | Status: SHIPPED | OUTPATIENT
Start: 2025-04-28

## 2025-05-19 NOTE — LETTER
Provider Staff:  Action required for this patient    Requires labs      Please see care gap opportunities below in Care Due Message.    Thanks!  [facility]     Appointments      Date Provider   Last Visit   5/14/2025 Melecio Junior MD   Next Visit   Visit date not found Melecio Junior MD     Refill Decision Note   Abhishek Baez  is requesting a refill authorization.    Brief Assessment and Rationale for Refill:  Approve       Medication Therapy Plan:  T4 WNL      Comments:     Note composed:5:32 PM 05/19/2025             December 10, 2018      Ugo Pratt MD  [address] [place] [address]           [facility]iology  [address]  [place] [address]  Phone: [phone]  Fax: [phone]          Patient: Wilmer Aponte   MR Number: [id number]   YOB: 2000   Date of Visit: 12/10/2018       Dear Dr. Ugo Pratt:    Thank you for referring Wilmer Aponte to me for evaluation. Attached you will find relevant portions of my assessment and plan of care.    If you have questions, please do not hesitate to call me. I look forward to following Wilmer Aponte along with you.    Sincerely,    Morteza Renae MD    Enclosure  CC:  No Recipients    If you would like to receive this communication electronically, please contact [email or web address] or [phone] to request more information on [employer] access.    For providers and/or their staff who would like to refer a patient to [facility], please contact us through our one-stop-shop provider referral line, [facility], at [phone].    If you feel you have received this communication in error or would no longer like to receive these types of communications, please e-mail [email or web address]

## 2025-06-10 ENCOUNTER — PATIENT MESSAGE (OUTPATIENT)
Dept: TRANSPLANT | Facility: CLINIC | Age: 25
End: 2025-06-10
Payer: MEDICAID

## 2025-07-08 DIAGNOSIS — Z94.1 HEART REPLACED BY TRANSPLANT: ICD-10-CM

## 2025-07-08 RX ORDER — MYCOPHENOLATE MOFETIL 200 MG/ML
POWDER, FOR SUSPENSION ORAL
Qty: 480 ML | Refills: 11 | Status: SHIPPED | OUTPATIENT
Start: 2025-07-08

## 2025-07-21 DIAGNOSIS — Z94.1 STATUS POST HEART TRANSPLANT: ICD-10-CM

## 2025-07-21 DIAGNOSIS — Z79.2 NEED FOR ANTIBIOTIC PROPHYLAXIS FOR DENTAL PROCEDURE: Primary | ICD-10-CM

## 2025-07-21 RX ORDER — AMOXICILLIN 400 MG/5ML
2000 POWDER, FOR SUSPENSION ORAL SEE ADMIN INSTRUCTIONS
Qty: 75 ML | Refills: 1 | Status: SHIPPED | OUTPATIENT
Start: 2025-07-21

## 2025-07-23 ENCOUNTER — HOSPITAL ENCOUNTER (INPATIENT)
Facility: HOSPITAL | Age: 25
LOS: 2 days | Discharge: HOME | DRG: 872 | End: 2025-07-25
Attending: HOSPITALIST | Admitting: STUDENT IN AN ORGANIZED HEALTH CARE EDUCATION/TRAINING PROGRAM
Payer: MEDICAID

## 2025-07-23 DIAGNOSIS — R50.9 FEVER: ICD-10-CM

## 2025-07-23 DIAGNOSIS — R06.02 SOB (SHORTNESS OF BREATH): ICD-10-CM

## 2025-07-23 DIAGNOSIS — I50.42 CHRONIC COMBINED SYSTOLIC AND DIASTOLIC HEART FAILURE: ICD-10-CM

## 2025-07-23 DIAGNOSIS — E04.1 THYROID NODULE GREATER THAN OR EQUAL TO 1 CM IN DIAMETER INCIDENTALLY NOTED ON IMAGING STUDY: Primary | ICD-10-CM

## 2025-07-23 DIAGNOSIS — R07.9 CHEST PAIN: ICD-10-CM

## 2025-07-23 DIAGNOSIS — Z94.1 HEART REPLACED BY TRANSPLANT: ICD-10-CM

## 2025-07-23 DIAGNOSIS — Z94.1 S/P ORTHOTOPIC HEART TRANSPLANT: ICD-10-CM

## 2025-07-23 PROBLEM — A41.9 SEPSIS: Status: ACTIVE | Noted: 2025-07-23

## 2025-07-23 LAB
ABSOLUTE EOSINOPHIL (OHS): 0.02 K/UL
ABSOLUTE MONOCYTE (OHS): 0.61 K/UL (ref 0.3–1)
ABSOLUTE NEUTROPHIL COUNT (OHS): 4.22 K/UL (ref 1.8–7.7)
ALBUMIN SERPL BCP-MCNC: 3.9 G/DL (ref 3.5–5.2)
ALP SERPL-CCNC: 116 UNIT/L (ref 40–150)
ALT SERPL W/O P-5'-P-CCNC: 18 UNIT/L (ref 10–44)
ANION GAP (OHS): 9 MMOL/L (ref 8–16)
AORTIC SIZE INDEX (SOV): 1.7 CM/M2
AST SERPL-CCNC: 13 UNIT/L (ref 11–45)
AV AREA BY CONTINUOUS VTI: 2.2 CM2
AV INDEX (PROSTH): 0.75
AV LVOT MEAN GRADIENT: 2 MMHG
AV LVOT PEAK GRADIENT: 2 MMHG
AV MEAN GRADIENT: 2 MMHG
AV PEAK GRADIENT: 4 MMHG
AV VALVE AREA BY VELOCITY RATIO: 2 CM²
AV VALVE AREA: 2.1 CM2
AV VELOCITY RATIO: 0.7
BASOPHILS # BLD AUTO: 0.02 K/UL
BASOPHILS NFR BLD AUTO: 0.4 %
BILIRUB SERPL-MCNC: 1 MG/DL (ref 0.1–1)
BILIRUB UR QL STRIP.AUTO: NEGATIVE
BSA FOR ECHO PROCEDURE: 1.83 M2
BUN SERPL-MCNC: 5 MG/DL (ref 6–20)
CALCIUM SERPL-MCNC: 8.3 MG/DL (ref 8.7–10.5)
CHLORIDE SERPL-SCNC: 103 MMOL/L (ref 95–110)
CLARITY UR: CLEAR
CO2 SERPL-SCNC: 23 MMOL/L (ref 23–29)
COLOR UR AUTO: YELLOW
CREAT SERPL-MCNC: 0.8 MG/DL (ref 0.5–1.4)
CV ECHO LV RWT: 0.42 CM
DOP CALC AO PEAK VEL: 1 M/S
DOP CALC AO VTI: 14.5 CM
DOP CALC LVOT AREA: 2.8 CM2
DOP CALC LVOT DIAMETER: 1.9 CM
DOP CALC LVOT PEAK VEL: 0.7 M/S
DOP CALC LVOT STROKE VOLUME: 30.9 CM3
DOP CALCLVOT PEAK VEL VTI: 10.9 CM
E/E' RATIO: 4 M/S
ECHO EF ESTIMATED: 56 %
ECHO LV POSTERIOR WALL: 0.8 CM (ref 0.6–1.1)
ERYTHROCYTE [DISTWIDTH] IN BLOOD BY AUTOMATED COUNT: 13.2 % (ref 11.5–14.5)
FRACTIONAL SHORTENING: 31.6 % (ref 28–44)
GFR SERPLBLD CREATININE-BSD FMLA CKD-EPI: >60 ML/MIN/1.73/M2
GLUCOSE SERPL-MCNC: 119 MG/DL (ref 70–110)
GLUCOSE UR QL STRIP: ABNORMAL
HCT VFR BLD AUTO: 47.2 % (ref 40–54)
HGB BLD-MCNC: 16.2 GM/DL (ref 14–18)
HGB UR QL STRIP: ABNORMAL
IMM GRANULOCYTES # BLD AUTO: 0.02 K/UL (ref 0–0.04)
IMM GRANULOCYTES NFR BLD AUTO: 0.4 % (ref 0–0.5)
INTERVENTRICULAR SEPTUM: 0.8 CM (ref 0.6–1.1)
KETONES UR QL STRIP: ABNORMAL
LEFT INTERNAL DIMENSION IN SYSTOLE: 2.6 CM (ref 2.1–4)
LEFT VENTRICLE DIASTOLIC VOLUME INDEX: 30.27 ML/M2
LEFT VENTRICLE DIASTOLIC VOLUME: 56 ML
LEFT VENTRICLE MASS INDEX: 46.5 G/M2
LEFT VENTRICLE SYSTOLIC VOLUME INDEX: 13 ML/M2
LEFT VENTRICLE SYSTOLIC VOLUME: 24 ML
LEFT VENTRICULAR INTERNAL DIMENSION IN DIASTOLE: 3.8 CM (ref 3.5–6)
LEFT VENTRICULAR MASS: 86 G
LEUKOCYTE ESTERASE UR QL STRIP: NEGATIVE
LV LATERAL E/E' RATIO: 2.8 M/S
LV SEPTAL E/E' RATIO: 7.8 M/S
LYMPHOCYTES # BLD AUTO: 0.58 K/UL (ref 1–4.8)
Lab: 1.8 CM/M
MAGNESIUM SERPL-MCNC: 1.4 MG/DL (ref 1.6–2.6)
MCH RBC QN AUTO: 27.9 PG (ref 27–31)
MCHC RBC AUTO-ENTMCNC: 34.3 G/DL (ref 32–36)
MCV RBC AUTO: 81 FL (ref 82–98)
MV PEAK E VEL: 0.7 M/S
NITRITE UR QL STRIP: NEGATIVE
NUCLEATED RBC (/100WBC) (OHS): 0 /100 WBC
OHS CV CPX PATIENT HEIGHT IN: 70
PH UR STRIP: 6 [PH]
PHOSPHATE SERPL-MCNC: <1 MG/DL (ref 2.7–4.5)
PISA TR MAX VEL: 2.4 M/S
PLATELET # BLD AUTO: 157 K/UL (ref 150–450)
PMV BLD AUTO: 11.1 FL (ref 9.2–12.9)
POTASSIUM SERPL-SCNC: 3.2 MMOL/L (ref 3.5–5.1)
PROT SERPL-MCNC: 6.5 GM/DL (ref 6–8.4)
PROT UR QL STRIP: NEGATIVE
RBC # BLD AUTO: 5.8 M/UL (ref 4.6–6.2)
RELATIVE EOSINOPHIL (OHS): 0.4 %
RELATIVE LYMPHOCYTE (OHS): 10.6 % (ref 18–48)
RELATIVE MONOCYTE (OHS): 11.2 % (ref 4–15)
RELATIVE NEUTROPHIL (OHS): 77 % (ref 38–73)
SINUS: 3.2 CM
SODIUM SERPL-SCNC: 135 MMOL/L (ref 136–145)
SP GR UR STRIP: >=1.03
STJ: 2.7 CM
TACROLIMUS BLD-MCNC: 8.7 NG/ML (ref 5–15)
TDI LATERAL: 0.25 M/S
TDI SEPTAL: 0.09 M/S
TDI: 0.17 M/S
TRICUSPID ANNULAR PLANE SYSTOLIC EXCURSION: 1.7 CM
TV PEAK GRADIENT: 23 MMHG
UROBILINOGEN UR STRIP-ACNC: NEGATIVE EU/DL
WBC # BLD AUTO: 5.47 K/UL (ref 3.9–12.7)
Z-SCORE OF LEFT VENTRICULAR DIMENSION IN END DIASTOLE: -3.02
Z-SCORE OF LEFT VENTRICULAR DIMENSION IN END SYSTOLE: -1.57

## 2025-07-23 PROCEDURE — 25000003 PHARM REV CODE 250: Performed by: HOSPITALIST

## 2025-07-23 PROCEDURE — 83735 ASSAY OF MAGNESIUM: CPT | Performed by: PHYSICIAN ASSISTANT

## 2025-07-23 PROCEDURE — 36415 COLL VENOUS BLD VENIPUNCTURE: CPT | Performed by: PHYSICIAN ASSISTANT

## 2025-07-23 PROCEDURE — 25000003 PHARM REV CODE 250: Performed by: PHYSICIAN ASSISTANT

## 2025-07-23 PROCEDURE — 27000207 HC ISOLATION

## 2025-07-23 PROCEDURE — 87040 BLOOD CULTURE FOR BACTERIA: CPT | Performed by: PHYSICIAN ASSISTANT

## 2025-07-23 PROCEDURE — 81003 URINALYSIS AUTO W/O SCOPE: CPT | Performed by: PHYSICIAN ASSISTANT

## 2025-07-23 PROCEDURE — 80053 COMPREHEN METABOLIC PANEL: CPT | Performed by: PHYSICIAN ASSISTANT

## 2025-07-23 PROCEDURE — 80197 ASSAY OF TACROLIMUS: CPT | Performed by: PHYSICIAN ASSISTANT

## 2025-07-23 PROCEDURE — 63600175 PHARM REV CODE 636 W HCPCS: Performed by: HOSPITALIST

## 2025-07-23 PROCEDURE — 25500020 PHARM REV CODE 255: Performed by: HOSPITALIST

## 2025-07-23 PROCEDURE — 11000001 HC ACUTE MED/SURG PRIVATE ROOM

## 2025-07-23 PROCEDURE — 85025 COMPLETE CBC W/AUTO DIFF WBC: CPT | Performed by: PHYSICIAN ASSISTANT

## 2025-07-23 PROCEDURE — 84100 ASSAY OF PHOSPHORUS: CPT | Performed by: PHYSICIAN ASSISTANT

## 2025-07-23 PROCEDURE — 63600175 PHARM REV CODE 636 W HCPCS: Performed by: PHYSICIAN ASSISTANT

## 2025-07-23 RX ORDER — MAGNESIUM SULFATE HEPTAHYDRATE 40 MG/ML
2 INJECTION, SOLUTION INTRAVENOUS ONCE
Status: COMPLETED | OUTPATIENT
Start: 2025-07-23 | End: 2025-07-23

## 2025-07-23 RX ORDER — IBUPROFEN 200 MG
16 TABLET ORAL
Status: DISCONTINUED | OUTPATIENT
Start: 2025-07-23 | End: 2025-07-25 | Stop reason: HOSPADM

## 2025-07-23 RX ORDER — ONDANSETRON HYDROCHLORIDE 2 MG/ML
4 INJECTION, SOLUTION INTRAVENOUS EVERY 8 HOURS PRN
Status: DISCONTINUED | OUTPATIENT
Start: 2025-07-23 | End: 2025-07-25 | Stop reason: HOSPADM

## 2025-07-23 RX ORDER — SODIUM CHLORIDE 0.9 % (FLUSH) 0.9 %
10 SYRINGE (ML) INJECTION EVERY 8 HOURS PRN
Status: DISCONTINUED | OUTPATIENT
Start: 2025-07-23 | End: 2025-07-25 | Stop reason: HOSPADM

## 2025-07-23 RX ORDER — PREDNISONE 2.5 MG/1
5 TABLET ORAL DAILY
Status: DISCONTINUED | OUTPATIENT
Start: 2025-07-23 | End: 2025-07-25 | Stop reason: HOSPADM

## 2025-07-23 RX ORDER — IPRATROPIUM BROMIDE AND ALBUTEROL SULFATE 2.5; .5 MG/3ML; MG/3ML
3 SOLUTION RESPIRATORY (INHALATION) EVERY 4 HOURS PRN
Status: DISCONTINUED | OUTPATIENT
Start: 2025-07-23 | End: 2025-07-25 | Stop reason: HOSPADM

## 2025-07-23 RX ORDER — BISACODYL 10 MG/1
10 SUPPOSITORY RECTAL DAILY PRN
Status: DISCONTINUED | OUTPATIENT
Start: 2025-07-23 | End: 2025-07-25 | Stop reason: HOSPADM

## 2025-07-23 RX ORDER — ALUMINUM HYDROXIDE, MAGNESIUM HYDROXIDE, AND SIMETHICONE 1200; 120; 1200 MG/30ML; MG/30ML; MG/30ML
30 SUSPENSION ORAL 4 TIMES DAILY PRN
Status: DISCONTINUED | OUTPATIENT
Start: 2025-07-23 | End: 2025-07-25 | Stop reason: HOSPADM

## 2025-07-23 RX ORDER — TACROLIMUS 1 MG/1
1 CAPSULE ORAL 2 TIMES DAILY
Status: DISCONTINUED | OUTPATIENT
Start: 2025-07-23 | End: 2025-07-25 | Stop reason: HOSPADM

## 2025-07-23 RX ORDER — NALOXONE HCL 0.4 MG/ML
0.02 VIAL (ML) INJECTION
Status: DISCONTINUED | OUTPATIENT
Start: 2025-07-23 | End: 2025-07-25 | Stop reason: HOSPADM

## 2025-07-23 RX ORDER — PRAVASTATIN SODIUM 40 MG/1
40 TABLET ORAL NIGHTLY
Status: DISCONTINUED | OUTPATIENT
Start: 2025-07-23 | End: 2025-07-25 | Stop reason: HOSPADM

## 2025-07-23 RX ORDER — HYDROXYZINE HYDROCHLORIDE 25 MG/1
25 TABLET, FILM COATED ORAL 3 TIMES DAILY PRN
Status: DISCONTINUED | OUTPATIENT
Start: 2025-07-23 | End: 2025-07-25 | Stop reason: HOSPADM

## 2025-07-23 RX ORDER — PROCHLORPERAZINE EDISYLATE 5 MG/ML
5 INJECTION INTRAMUSCULAR; INTRAVENOUS EVERY 6 HOURS PRN
Status: DISCONTINUED | OUTPATIENT
Start: 2025-07-23 | End: 2025-07-25 | Stop reason: HOSPADM

## 2025-07-23 RX ORDER — POLYETHYLENE GLYCOL 3350 17 G/17G
17 POWDER, FOR SOLUTION ORAL DAILY PRN
Status: DISCONTINUED | OUTPATIENT
Start: 2025-07-23 | End: 2025-07-25 | Stop reason: HOSPADM

## 2025-07-23 RX ORDER — GLUCAGON 1 MG
1 KIT INJECTION
Status: DISCONTINUED | OUTPATIENT
Start: 2025-07-23 | End: 2025-07-25 | Stop reason: HOSPADM

## 2025-07-23 RX ORDER — IBUPROFEN 200 MG
24 TABLET ORAL
Status: DISCONTINUED | OUTPATIENT
Start: 2025-07-23 | End: 2025-07-25 | Stop reason: HOSPADM

## 2025-07-23 RX ORDER — SIMETHICONE 80 MG
1 TABLET,CHEWABLE ORAL 4 TIMES DAILY PRN
Status: DISCONTINUED | OUTPATIENT
Start: 2025-07-23 | End: 2025-07-25 | Stop reason: HOSPADM

## 2025-07-23 RX ORDER — TALC
6 POWDER (GRAM) TOPICAL NIGHTLY PRN
Status: DISCONTINUED | OUTPATIENT
Start: 2025-07-23 | End: 2025-07-25 | Stop reason: HOSPADM

## 2025-07-23 RX ORDER — ACETAMINOPHEN 325 MG/1
650 TABLET ORAL EVERY 4 HOURS PRN
Status: DISCONTINUED | OUTPATIENT
Start: 2025-07-23 | End: 2025-07-25 | Stop reason: HOSPADM

## 2025-07-23 RX ORDER — SODIUM,POTASSIUM PHOSPHATES 280-250MG
1 POWDER IN PACKET (EA) ORAL
Status: DISCONTINUED | OUTPATIENT
Start: 2025-07-23 | End: 2025-07-24

## 2025-07-23 RX ORDER — NAPROXEN SODIUM 220 MG/1
81 TABLET, FILM COATED ORAL DAILY
Status: DISCONTINUED | OUTPATIENT
Start: 2025-07-23 | End: 2025-07-25 | Stop reason: HOSPADM

## 2025-07-23 RX ADMIN — IOHEXOL 75 ML: 350 INJECTION, SOLUTION INTRAVENOUS at 03:07

## 2025-07-23 RX ADMIN — VANCOMYCIN HYDROCHLORIDE 1500 MG: 1.5 INJECTION, POWDER, LYOPHILIZED, FOR SOLUTION INTRAVENOUS at 10:07

## 2025-07-23 RX ADMIN — TACROLIMUS 1 MG: 1 CAPSULE ORAL at 06:07

## 2025-07-23 RX ADMIN — SODIUM PHOSPHATE, MONOBASIC, MONOHYDRATE AND SODIUM PHOSPHATE, DIBASIC, ANHYDROUS 20.1 MMOL: 142; 276 INJECTION, SOLUTION INTRAVENOUS at 02:07

## 2025-07-23 RX ADMIN — PREDNISONE 5 MG: 2.5 TABLET ORAL at 09:07

## 2025-07-23 RX ADMIN — VANCOMYCIN HYDROCHLORIDE 1000 MG: 1 INJECTION, POWDER, LYOPHILIZED, FOR SOLUTION INTRAVENOUS at 06:07

## 2025-07-23 RX ADMIN — POTASSIUM & SODIUM PHOSPHATES POWDER PACK 280-160-250 MG 1 PACKET: 280-160-250 PACK at 05:07

## 2025-07-23 RX ADMIN — POTASSIUM BICARBONATE 50 MEQ: 977.5 TABLET, EFFERVESCENT ORAL at 05:07

## 2025-07-23 RX ADMIN — MAGNESIUM SULFATE HEPTAHYDRATE 2 G: 40 INJECTION, SOLUTION INTRAVENOUS at 02:07

## 2025-07-23 RX ADMIN — PRAVASTATIN SODIUM 40 MG: 40 TABLET ORAL at 09:07

## 2025-07-23 RX ADMIN — TACROLIMUS 1 MG: 1 CAPSULE ORAL at 09:07

## 2025-07-23 RX ADMIN — ASPIRIN 81 MG CHEWABLE TABLET 81 MG: 81 TABLET CHEWABLE at 09:07

## 2025-07-23 RX ADMIN — PIPERACILLIN SODIUM AND TAZOBACTAM SODIUM 4.5 G: 4; .5 INJECTION, POWDER, FOR SOLUTION INTRAVENOUS at 09:07

## 2025-07-23 RX ADMIN — POTASSIUM & SODIUM PHOSPHATES POWDER PACK 280-160-250 MG 1 PACKET: 280-160-250 PACK at 09:07

## 2025-07-23 RX ADMIN — PIPERACILLIN SODIUM AND TAZOBACTAM SODIUM 4.5 G: 4; .5 INJECTION, POWDER, FOR SOLUTION INTRAVENOUS at 05:07

## 2025-07-23 NOTE — H&P
"  [facility] Card[facility] Medicine  History & Physical    Patient Name: Wilmer Aponte  MRN: [id number]  Patient Class: IP- Inpatient  Admission Date: 7/23/2025  Attending Physician: Subha Campo MD   Primary Care Provider: Ugo Pratt MD         Patient information was obtained from patient, past medical records, and ER records.     Subjective:     Principal Problem:Sepsis    Chief Complaint: No chief complaint on file.       HPI: Per : "The patient is a 23 y/o male with PMH heart transplant in Sept 2014 on prednisone, prograf, and cellcept, HTN, and HLP. He presented to the referring facility with fevers to 104, body aches, and chills. In the ER, Tmax was 102.9 and tachy in the 140s. WBC 8, LA 2.6, flu, covid, strep negative, UA negative, CXR unrevealing. Patient has poor dentition. Patient was given vanc and zosyn. The case was discussed with Dr. Gore of [facility] and would like the patient transferred for evaluation. Suspicion for bacteremia with possible oral source ? Given 1L of IVF and HRs improved to the 110s at the time of this transfer call. Rest of vitals stable."    Patient reports left lower tooth pain for the past 4-5 days with the above associated fevers, body aches. Denies any sick contacts, diaphoresis, lightheadedness, HA, CP, cough, congestion, abdominal pain, n/v/d, urinary symptoms, changes in BMs, numbness/tingling, weakness.       Past Medical History:   Diagnosis Date    Anxiety     Dilated cardiomyopathy     15 y/o    Encounter for blood transfusion     Gastrocutaneous fistula due to gastrostomy tube     Heart transplanted     9/29/14 at [place].  Donor CMV+/R-.  [place] BiVAD 9/23-29.    Hypertension     Oral aversion     Status post orthotopic heart transplant        Past Surgical History:   Procedure Laterality Date    ANGIOGRAM, CORONARY, WITH LEFT HEART CATHETERIZATION N/A 3/25/2025    Procedure: Angiogram, Coronary, with Left Heart Cath;  Surgeon: Jessica Brown, " Pranav LINDSEY MD;  Location: [facility] CATH LAB;  Service: Cardiology;  Laterality: N/A;    BIOPSY WITH ULTRASOUND GUIDANCE Right 10/26/2021    Procedure: BIOPSY, WITH US GUIDANCE;  Surgeon: Kirsty Ward MD;  Location: [facility] CATH LAB;  Service: Cardiology;  Laterality: Right;    COMBINED RIGHT AND RETROGRADE LEFT HEART CATHETERIZATION FOR CONGENITAL HEART DEFECT N/A 4/22/2021    Procedure: CATHETERIZATION, HEART, COMBINED RIGHT AND RETROGRADE LEFT, FOR CONGENITAL HEART DEFECT;  Surgeon: Jose Burns Jr., MD;  Location: [facility] CATH LAB;  Service: Cardiology;  Laterality: N/A;  Pedi Heart    ESOPHAGOGASTRODUODENOSCOPY N/A 7/20/2021    Procedure: EGD (ESOPHAGOGASTRODUODENOSCOPY);  Surgeon: Nacho Martínez MD;  Location: [facility] ([address] [place] [address]);  Service: Endoscopy;  Laterality: N/A;    EXTRACORPOREAL CIRCULATION      GASTROSTOMY TUBE PLACEMENT      HEART TRANSPLANT      LEFT VENTRICULAR ASSIST DEVICE      RIGHT HEART CATHETERIZATION Right 6/18/2021    Procedure: INSERTION, CATHETER, RIGHT HEART;  Surgeon: Mg Monroy MD;  Location: [facility] CATH LAB;  Service: Cardiology;  Laterality: Right;       Review of patient's allergies indicates:   Allergen Reactions    Clindamycin Anaphylaxis       No current facility-administered medications on file prior to encounter.     Current Outpatient Medications on File Prior to Encounter   Medication Sig    albuterol (PROVENTIL/VENTOLIN HFA) 90 mcg/actuation inhaler Inhale 2 puffs into the lungs every 4 (four) hours as needed.    amoxicillin (AMOXIL) 400 mg/5 mL suspension Take 25 mLs (2,000 mg total) by mouth As instructed (Take 2000 mg one hour prior to any dental procedure).    aspirin 81 MG Chew Take 1 tablet (81 mg total) by mouth once daily.    cholecalciferol, vitamin D3, (VITAMIN D3) 25 mcg (1,000 unit) capsule Take 1 capsule (1,000 Units total) by mouth once daily.    magnesium oxide (MAG-OX) 400 mg (241.3 mg magnesium) tablet Take 1 tablet (400 mg total) by mouth 2 (two) times  daily.    mycophenolate mofetil (CELLCEPT) 200 mg/mL SusR TAKE 7.5 MLS by mouth Twice Daily    pravastatin (PRAVACHOL) 40 MG tablet Take 1 tablet (40 mg total) by mouth every evening.    predniSONE (DELTASONE) 5 MG tablet Take 1 tablet (5 mg total) by mouth once daily.    sulfamethoxazole-trimethoprim 400-80mg (BACTRIM,SEPTRA) 400-80 mg per tablet TAKE 1 TABLET BY MOUTH EVERY DAY    tacrolimus (PROGRAF) 1 MG Cap Take 1 capsule (1 mg total) by mouth every 12 (twelve) hours. Take sublingual     Family History       Problem Relation (Age of Onset)    Diabetes type II Mother    Dilated cardiomyopathy Father, Paternal Uncle          Tobacco Use    Smoking status: Never    Smokeless tobacco: Never   Substance and Sexual Activity    Alcohol use: No    Drug use: No    Sexual activity: Never     Review of Systems   Constitutional:  Positive for chills and fever. Negative for activity change.   HENT:  Positive for dental problem. Negative for trouble swallowing.    Eyes:  Negative for photophobia and visual disturbance.   Respiratory:  Positive for shortness of breath. Negative for chest tightness and wheezing.    Cardiovascular:  Negative for chest pain, palpitations and leg swelling.   Gastrointestinal:  Negative for abdominal pain, constipation, diarrhea, nausea and vomiting.   Genitourinary:  Negative for dysuria, frequency, hematuria and urgency.   Musculoskeletal:  Negative for arthralgias, back pain and gait problem.   Skin:  Negative for color change and rash.   Neurological:  Negative for dizziness, syncope, weakness, light-headedness, numbness and headaches.   Psychiatric/Behavioral:  Negative for agitation and confusion. The patient is not nervous/anxious.      Objective:     Vital Signs (Most Recent):  Temp: 99.4 °F (37.4 °C) (07/23/25 0815)  Pulse: 108 (07/23/25 0815)  Resp: 20 (07/23/25 0815)  BP: (!) 124/2 (07/23/25 0815)  SpO2: 99 % (07/23/25 0815) Vital Signs (24h Range):  Temp:  [98.7 °F (37.1 °C)-99.4 °F  (37.4 °C)] 99.4 °F (37.4 °C)  Pulse:  [108-118] 108  Resp:  [20-24] 20  SpO2:  [97 %-100 %] 99 %  BP: (124-137)/(2-78) 124/2     Weight: 68.2 kg (150 lb 5.7 oz)  Body mass index is 21.57 kg/m².     Physical Exam  Vitals and nursing note reviewed.   Constitutional:       General: He is not in acute distress.     Appearance: Normal appearance. He is not ill-appearing.   HENT:      Head: Normocephalic and atraumatic.      Right Ear: External ear normal.      Left Ear: External ear normal.      Mouth/Throat:      Dentition: Dental tenderness and dental caries present.     Eyes:      Extraocular Movements: Extraocular movements intact.      Conjunctiva/sclera: Conjunctivae normal.      Pupils: Pupils are equal, round, and reactive to light.   Cardiovascular:      Rate and Rhythm: Normal rate and regular rhythm.      Pulses: Normal pulses.      Heart sounds: Normal heart sounds. No murmur heard.  Pulmonary:      Effort: Pulmonary effort is normal. No respiratory distress.      Breath sounds: Normal breath sounds.   Abdominal:      General: Abdomen is flat. Bowel sounds are normal.      Tenderness: There is no abdominal tenderness.   Musculoskeletal:         General: Normal range of motion.      Cervical back: Normal range of motion and neck supple.      Right lower leg: No edema.      Left lower leg: No edema.   Skin:     General: Skin is warm and dry.      Capillary Refill: Capillary refill takes less than 2 seconds.      Coloration: Skin is not jaundiced.   Neurological:      General: No focal deficit present.      Mental Status: He is alert and oriented to person, place, and time. Mental status is at baseline.      Cranial Nerves: No cranial nerve deficit.   Psychiatric:         Mood and Affect: Mood normal.         Behavior: Behavior normal.              CRANIAL NERVES     CN III, IV, VI   Pupils are equal, round, and reactive to light.       Significant Labs: All pertinent labs within the past 24 hours have been  "reviewed.    Cardiac Markers:   Recent Labs   Lab 07/22/25  2319   BNP 87*     Troponin:   Recent Labs   Lab 07/22/25  2319   TROPONINI 0.02         Significant Imaging: I have reviewed all pertinent imaging results/findings within the past 24 hours.  Assessment/Plan:     Assessment & Plan  Sepsis  Fever  Patient has sepsis without organ dysfunction secondary to possible oral. A review of systems was completed. Patient's sepsis is stable    Current Antibiotics    vancomycin - pharmacy to dose, pharmacy to manage frequency, Intravenous  piperacillin-tazobactam (ZOSYN) 4.5 g in D5W 100 mL IVPB (MB+), Every 8 hours (non-standard times), Intravenous  vancomycin 1,500 mg in 0.9% NaCl 250 mL IVPB (admixture device), Once, Intravenous    Lactate  Recent Labs   Lab 07/22/25  2349 07/23/25  0102   LACTATE 2.6* 1     Culture Data  Blood Cultures No results found for: "CULTBLD"   mSOFA  MSOFA Total  Min: 0   Min taken time: 07/23/25 0901  Max: 0   Max taken time: 07/23/25 0901    Plan  - Antibiotics as listed above  - Fluid resuscitation as follows:Fluid Not Needed - Patient is not hypotensive and/or lactate is less than 4.0.   - Trend lactate to resolution  - Follow up culture data  - Vasopressors were not needed  - The following services were consulted:Hospital Medicine and [facility].  - Continued on Vanc/Zosyn for now  - CT ST Neck pending to evaluate for abscess  - follow BCx        Heart transplanted  Immunosuppression due to drug therapy  Chronic combined systolic and diastolic heart failure  - continue home prograf and prednisone  - holding cellcept in setting of infection  - BNP and Trop WNL  - Imaging at outside facility reviewed  - HTS consulted, appreciate recs  Essential hypertension  Patient's blood pressure range in the last 24 hours was: BP  Min: 124/2  Max: 137/77.The patient's inpatient anti-hypertensive regimen is listed below:  Current Antihypertensives       Plan  - BP is controlled, no changes needed to their " regimen  - Not on any antihypertensives  Anxiety  - not currently on any medications  - prn hydroxyzine    Other hyperlipidemia  - continue statin    Noncompliance with medications  - hx of noncompliance  - reports he has been taking all of his medications     VTE Risk Mitigation (From admission, onward)           Ordered     IP VTE LOW RISK PATIENT  Once         07/23/25 0752     Place sequential compression device  Until discontinued         07/23/25 0752                                              07/23/25 0654   Vital Signs   Temp 98.9 °F (37.2 °C)   Temp Source Oral   Pulse (!) 112   Heart Rate Source Monitor   Resp (!) 24   SpO2 100 %   Pulse Oximetry Type Intermittent   Probe Placed On (Pulse Ox) finger   Device (Oxygen Therapy) room air   /78   MAP (mmHg) 101   BP Location Right arm   BP Method Automatic   Patient Position Lying   Orthostatic VS No     Patient admitted to C[facility]. Patient arrived to floor from [facility] and transported via ambulance. No evidence of distress; patient AAO x4 at this time. Patient placed on tele. Vital signs obtained. Did a vitals assessment which demonstrated an elevated RR and HR, though BP and SaO2 remain stable. Patient reports 5/10 tooth pain. Plan of care initiated with patient. Bed in lowest position, locked, SR up x2, call bell in reach. Will continue to monitor patient.     Myra Romero PA-C  Department of Hospital Medicine  [facility] - Card[facility] Stepdown

## 2025-07-23 NOTE — PLAN OF CARE
Problem: Adult Inpatient Plan of Care  Goal: Plan of Care Review  Outcome: Progressing     Problem: Adult Inpatient Plan of Care  Goal: Absence of Hospital-Acquired Illness or Injury  Outcome: Progressing     Problem: Sepsis/Septic Shock  Goal: Absence of Infection Signs and Symptoms  Outcome: Progressing     Problem: Sepsis/Septic Shock  Goal: Optimal Nutrition Intake  Outcome: Progressing     Problem: Neutropenia  Goal: Absence of Infection  Outcome: Progressing     Patient is AAOX4, VSS, NAD. Plan of care reviewed and explained. Patient verbalized understanding. Fall precautions maintained. Bed in low and locked position. Side rails up x 2, call light within reach.

## 2025-07-23 NOTE — HOSPITAL COURSE
Mr. Aponte is a 24-year-old gentleman with history of heart transplant in 2014 (on prednisone, tacrolimus, mycophenolate), hypertension.  He is admitted with sepsis.  Patient had fever, tachycardia to the 130s, tachypnea.  No leukocytosis.  He had lactic acidosis at time of admission.  The source was thought to be a tooth abscess.  CT neck negative for abscess. Did incidentally find a small right thyroid nodule. Chest x-ray negative for consolidation.  An echocardiogram was not able to find any evidence endocarditis.  Blood cultures have been negative x4 thus far.   Patient given doses of vancomycin and Zosyn.  Heart transplant service and transplant infectious disease team were consulted.  ID recommended discontinuation of vancomycin.  Currently continues on Zosyn.  Recommended switching to Augmentin if blood cultures remain negative for 48 hours.

## 2025-07-23 NOTE — ASSESSMENT & PLAN NOTE
Patient's blood pressure range in the last 24 hours was: BP  Min: 124/2  Max: 137/77.The patient's inpatient anti-hypertensive regimen is listed below:  Current Antihypertensives       Plan  - BP is controlled, no changes needed to their regimen  - Not on any antihypertensives

## 2025-07-23 NOTE — CONSULTS
"Pharmacokinetic Initial Assessment: IV Vancomycin    Assessment/Plan:    Vancomycin 1500 mg IV x 1, then 1000 mg IV q8h  Trough prior to dose 7/24 @ 1030  Goal trough = 15-20 mcg/mL      Thank you for the consult, will continue to follow    Guevara Randall Pharm.D., BCPS  [id number]         Patient brief summary:  Wilmer Aponte is a 24 y.o. male initiated on antimicrobial therapy with IV Vancomycin for treatment of suspected sepsis    Drug Allergies:   Review of patient's allergies indicates:   Allergen Reactions    Clindamycin Anaphylaxis     Renal Function:   Estimated Creatinine Clearance: 136.9 mL/min (based on SCr of 0.8 mg/dL).,     CBC (last 72 hours):  Recent Labs   Lab Result Units 07/23/25  1027   WBC K/uL 5.47   HGB gm/dL 16.2   HCT % 47.2   Platelet Count K/uL 157   Lymph % % 10.6*   Mono % % 11.2   Eos % % 0.4   Basophil % % 0.4       Metabolic Panel (last 72 hours):  Recent Labs   Lab Result Units 07/22/25  2319 07/23/25  1027 07/23/25  1029   Sodium mmol/L  --  135*  --    Potassium mmol/L  --  3.2*  --    Chloride mmol/L  --  103  --    CO2 mmol/L  --  23  --    Glucose mg/dL  --  119*  --    Glucose, UA   --   --  2+*   BUN mg/dL  --  5*  --    Creatinine mg/dL  --  0.8  --    Albumin g/dL  --  3.9  --    Bilirubin Total mg/dL  --  1.0  --    ALP unit/L  --  116  --    AST unit/L  --  13  --    ALT unit/L  --  18  --    Magnesium  mg/dL  --  1.4*  --    Magnesium Level MG/DL 1.5*  --   --    Phosphorus Level mg/dL  --  <1.0*  --        Drug levels (last 3 results):  No results for input(s): "VANCOMYCINRA", "VANCORANDOM", "VANCOMYCINPE", "VANCOPEAK", "VANCOMYCINTR", "VANCOTROUGH" in the last 72 hours.    Microbiologic Results:  Microbiology Results (last 7 days)       Procedure Component Value Units Date/Time    Blood culture [[id number]] Collected: 07/23/25 1031    Order Status: Resulted Specimen: Blood from Peripheral, Lower Arm, Right Updated: 07/23/25 1214    Blood culture [[id number]] Collected: " 07/23/25 1028    Order Status: Resulted Specimen: Blood from Peripheral, Forearm, Right Updated: 07/23/25 1214    Blood culture [[id number]] Collected: 07/23/25 1028    Order Status: Resulted Specimen: Blood from Peripheral, Forearm, Right Updated: 07/23/25 1201    Blood culture [[id number]] Collected: 07/23/25 1031    Order Status: Resulted Specimen: Blood from Peripheral, Lower Arm, Right Updated: 07/23/25 1201

## 2025-07-23 NOTE — ASSESSMENT & PLAN NOTE
- continue home prograf and prednisone  - holding cellcept in setting of infection  - BNP and Trop WNL  - Imaging at outside facility reviewed  - HTS consulted, appreciate recs   no

## 2025-07-23 NOTE — ASSESSMENT & PLAN NOTE
- continue home prograf and prednisone  - holding cellcept in setting of infection  - BNP and Trop WNL  - Imaging at outside facility reviewed  - HTS consulted, appreciate recs

## 2025-07-23 NOTE — CARE UPDATE
"RAPID RESPONSE NURSE CHART REVIEW        Chart Reviewed: 07/23/2025, 10:40 AM    MRN: [id number]  Bed: 305/305 A    Dx: Sepsis    Wilmer Aponte has a past medical history of Anxiety, Dilated cardiomyopathy, Encounter for blood transfusion, Gastrocutaneous fistula due to gastrostomy tube, Heart transplanted, Hypertension, Oral aversion, and Status post orthotopic heart transplant.    Last VS: /82 (BP Location: Right arm, Patient Position: Lying)   Pulse (!) 133   Temp 99.4 °F (37.4 °C)   Resp 20   Ht 5' 10" (1.778 m)   Wt 68.2 kg (150 lb 5.7 oz)   SpO2 99%   BMI 21.57 kg/m²     24H Vital Sign Range:  Temp:  [98.7 °F (37.1 °C)-99.4 °F (37.4 °C)]   Pulse:  [108-133]   Resp:  [20-24]   BP: (124-137)/(77-82)   SpO2:  [97 %-100 %]     Level of Consciousness (AVPU): alert    No results for input(s): "CBC", "WBC", "HGB", "HCT", "PLT" in the last 72 hours.    No results for input(s): "NA", "K", "CL", "CO2", "BUN", "CREATININE", "GLU", "PHOS", "MG" in the last 72 hours.    Invalid input(s): "CMP", "TBIL"     No results for input(s): "PH", "PCO2", "PO2", "HCO3", "POCSATURATED", "BE" in the last 72 hours.     OXYGEN:             MEWS score: 4    Rounding completed at 1020.    Rounding completed with charge nurse Aiyana for tachycardia reports sepsis workup and labs in process. No acute concerns verbalized at this time. Instructed to call [phone] for further concerns or assistance.    Zaida Dunne RN        "

## 2025-07-23 NOTE — ASSESSMENT & PLAN NOTE
"Patient has sepsis without organ dysfunction secondary to possible oral. A review of systems was completed. Patient's sepsis is stable    Current Antibiotics    vancomycin - pharmacy to dose, pharmacy to manage frequency, Intravenous  piperacillin-tazobactam (ZOSYN) 4.5 g in D5W 100 mL IVPB (MB+), Every 8 hours (non-standard times), Intravenous  vancomycin 1,500 mg in 0.9% NaCl 250 mL IVPB (admixture device), Once, Intravenous    Lactate  Recent Labs   Lab 07/22/25  2349 07/23/25  0102   LACTATE 2.6* 1     Culture Data  Blood Cultures No results found for: "CULTBLD"   mSOFA  MSOFA Total  Min: 0   Min taken time: 07/23/25 0901  Max: 0   Max taken time: 07/23/25 0901    Plan  - Antibiotics as listed above  - Fluid resuscitation as follows:Fluid Not Needed - Patient is not hypotensive and/or lactate is less than 4.0.   - Trend lactate to resolution  - Follow up culture data  - Vasopressors were not needed  - The following services were consulted:Hospital Medicine and [facility].  - Continued on Vanc/Zosyn for now  - CT ST Neck pending to evaluate for abscess  - follow BCx        "

## 2025-07-23 NOTE — SUBJECTIVE & OBJECTIVE
Past Medical History:   Diagnosis Date    Anxiety     Dilated cardiomyopathy     15 y/o    Encounter for blood transfusion     Gastrocutaneous fistula due to gastrostomy tube     Heart transplanted     9/29/14 at [place].  Donor CMV+/R-.  [place] [facility] 9/23-29.    Hypertension     Oral aversion     Status post orthotopic heart transplant        Past Surgical History:   Procedure Laterality Date    ANGIOGRAM, CORONARY, WITH LEFT HEART CATHETERIZATION N/A 3/25/2025    Procedure: Angiogram, Coronary, with Left Heart Cath;  Surgeon: Pranav Valles MD;  Location: [facility] CATH LAB;  Service: Cardiology;  Laterality: N/A;    BIOPSY WITH ULTRASOUND GUIDANCE Right 10/26/2021    Procedure: BIOPSY, WITH US GUIDANCE;  Surgeon: Kirsty Ward MD;  Location: [facility] CATH LAB;  Service: Cardiology;  Laterality: Right;    COMBINED RIGHT AND RETROGRADE LEFT HEART CATHETERIZATION FOR CONGENITAL HEART DEFECT N/A 4/22/2021    Procedure: CATHETERIZATION, HEART, COMBINED RIGHT AND RETROGRADE LEFT, FOR CONGENITAL HEART DEFECT;  Surgeon: Jose Burns Jr., MD;  Location: [facility] CATH LAB;  Service: Cardiology;  Laterality: N/A;  Pedi Heart    ESOPHAGOGASTRODUODENOSCOPY N/A 7/20/2021    Procedure: EGD (ESOPHAGOGASTRODUODENOSCOPY);  Surgeon: Nacho Martínez MD;  Location: [facility] ENDO ([address] [place] [address]);  Service: Endoscopy;  Laterality: N/A;    EXTRACORPOREAL CIRCULATION      GASTROSTOMY TUBE PLACEMENT      HEART TRANSPLANT      LEFT VENTRICULAR ASSIST DEVICE      RIGHT HEART CATHETERIZATION Right 6/18/2021    Procedure: INSERTION, CATHETER, RIGHT HEART;  Surgeon: Mg Monroy MD;  Location: [facility] CATH LAB;  Service: Cardiology;  Laterality: Right;       Review of patient's allergies indicates:   Allergen Reactions    Clindamycin Anaphylaxis       No current facility-administered medications on file prior to encounter.     Current Outpatient Medications on File Prior to Encounter   Medication Sig    albuterol (PROVENTIL/VENTOLIN HFA) 90  mcg/actuation inhaler Inhale 2 puffs into the lungs every 4 (four) hours as needed.    amoxicillin (AMOXIL) 400 mg/5 mL suspension Take 25 mLs (2,000 mg total) by mouth As instructed (Take 2000 mg one hour prior to any dental procedure).    aspirin 81 MG Chew Take 1 tablet (81 mg total) by mouth once daily.    cholecalciferol, vitamin D3, (VITAMIN D3) 25 mcg (1,000 unit) capsule Take 1 capsule (1,000 Units total) by mouth once daily.    magnesium oxide (MAG-OX) 400 mg (241.3 mg magnesium) tablet Take 1 tablet (400 mg total) by mouth 2 (two) times daily.    mycophenolate mofetil (CELLCEPT) 200 mg/mL SusR TAKE 7.5 MLS by mouth Twice Daily    pravastatin (PRAVACHOL) 40 MG tablet Take 1 tablet (40 mg total) by mouth every evening.    predniSONE (DELTASONE) 5 MG tablet Take 1 tablet (5 mg total) by mouth once daily.    sulfamethoxazole-trimethoprim 400-80mg (BACTRIM,SEPTRA) 400-80 mg per tablet TAKE 1 TABLET BY MOUTH EVERY DAY    tacrolimus (PROGRAF) 1 MG Cap Take 1 capsule (1 mg total) by mouth every 12 (twelve) hours. Take sublingual     Family History       Problem Relation (Age of Onset)    Diabetes type II Mother    Dilated cardiomyopathy Father, Paternal Uncle          Tobacco Use    Smoking status: Never    Smokeless tobacco: Never   Substance and Sexual Activity    Alcohol use: No    Drug use: No    Sexual activity: Never     Review of Systems   Constitutional:  Positive for chills and fever. Negative for activity change.   HENT:  Positive for dental problem. Negative for trouble swallowing.    Eyes:  Negative for photophobia and visual disturbance.   Respiratory:  Positive for shortness of breath. Negative for chest tightness and wheezing.    Cardiovascular:  Negative for chest pain, palpitations and leg swelling.   Gastrointestinal:  Negative for abdominal pain, constipation, diarrhea, nausea and vomiting.   Genitourinary:  Negative for dysuria, frequency, hematuria and urgency.   Musculoskeletal:  Negative for  arthralgias, back pain and gait problem.   Skin:  Negative for color change and rash.   Neurological:  Negative for dizziness, syncope, weakness, light-headedness, numbness and headaches.   Psychiatric/Behavioral:  Negative for agitation and confusion. The patient is not nervous/anxious.      Objective:     Vital Signs (Most Recent):  Temp: 99.4 °F (37.4 °C) (07/23/25 0815)  Pulse: 108 (07/23/25 0815)  Resp: 20 (07/23/25 0815)  BP: (!) 124/2 (07/23/25 0815)  SpO2: 99 % (07/23/25 0815) Vital Signs (24h Range):  Temp:  [98.7 °F (37.1 °C)-99.4 °F (37.4 °C)] 99.4 °F (37.4 °C)  Pulse:  [108-118] 108  Resp:  [20-24] 20  SpO2:  [97 %-100 %] 99 %  BP: (124-137)/(2-78) 124/2     Weight: 68.2 kg (150 lb 5.7 oz)  Body mass index is 21.57 kg/m².     Physical Exam  Vitals and nursing note reviewed.   Constitutional:       General: He is not in acute distress.     Appearance: Normal appearance. He is not ill-appearing.   HENT:      Head: Normocephalic and atraumatic.      Right Ear: External ear normal.      Left Ear: External ear normal.      Mouth/Throat:      Dentition: Dental tenderness and dental caries present.     Eyes:      Extraocular Movements: Extraocular movements intact.      Conjunctiva/sclera: Conjunctivae normal.      Pupils: Pupils are equal, round, and reactive to light.   Cardiovascular:      Rate and Rhythm: Normal rate and regular rhythm.      Pulses: Normal pulses.      Heart sounds: Normal heart sounds. No murmur heard.  Pulmonary:      Effort: Pulmonary effort is normal. No respiratory distress.      Breath sounds: Normal breath sounds.   Abdominal:      General: Abdomen is flat. Bowel sounds are normal.      Tenderness: There is no abdominal tenderness.   Musculoskeletal:         General: Normal range of motion.      Cervical back: Normal range of motion and neck supple.      Right lower leg: No edema.      Left lower leg: No edema.   Skin:     General: Skin is warm and dry.      Capillary Refill:  Capillary refill takes less than 2 seconds.      Coloration: Skin is not jaundiced.   Neurological:      General: No focal deficit present.      Mental Status: He is alert and oriented to person, place, and time. Mental status is at baseline.      Cranial Nerves: No cranial nerve deficit.   Psychiatric:         Mood and Affect: Mood normal.         Behavior: Behavior normal.              CRANIAL NERVES     CN III, IV, VI   Pupils are equal, round, and reactive to light.       Significant Labs: All pertinent labs within the past 24 hours have been reviewed.    Cardiac Markers:   Recent Labs   Lab 07/22/25  2319   BNP 87*     Troponin:   Recent Labs   Lab 07/22/25  2319   TROPONINI 0.02         Significant Imaging: I have reviewed all pertinent imaging results/findings within the past 24 hours.

## 2025-07-23 NOTE — PROGRESS NOTES
07/23/25 0654   Vital Signs   Temp 98.9 °F (37.2 °C)   Temp Source Oral   Pulse (!) 112   Heart Rate Source Monitor   Resp (!) 24   SpO2 100 %   Pulse Oximetry Type Intermittent   Probe Placed On (Pulse Ox) finger   Device (Oxygen Therapy) room air   /78   MAP (mmHg) 101   BP Location Right arm   BP Method Automatic   Patient Position Lying   Orthostatic VS No     Patient admitted to CSU. Patient arrived to floor from [facility] and transported via ambulance. No evidence of distress; patient AAO x4 at this time. Patient placed on tele. Vital signs obtained. Did a vitals assessment which demonstrated an elevated RR and HR, though BP and SaO2 remain stable. Patient reports 5/10 tooth pain. Plan of care initiated with patient. Bed in lowest position, locked, SR up x2, call bell in reach. Will continue to monitor patient.

## 2025-07-23 NOTE — PROVIDER TRANSFER
Outside Transfer Acceptance Note / Regional Referral Center    Referring facility:  [facility]  Referring provider: ERIK SAAVEDRA  Accepting facility: [facility]  Accepting provider: Melita Alexis   Admitting provider: Melita Alexis   Reason for transfer: Higher Level of Care   Transfer diagnosis: Fever  Transfer specialty requested: Transplant Heart  Transfer specialty notified: Yes  Transfer level: NUMBER 1-5: 2  Bed type requested: Med tele   Isolation status: No active isolations   Admission class or status: IP- Inpatient      Narrative     The patient is a 23 y/o male with PMH heart transplant in Sept 2014 on prednisone, prograf, and cellcept, HTN, and HLP. He presented to the referring facility with fevers to 104, body aches, and chills. In the ER, Tmax was 102.9 and tachy in the 140s. WBC 8, LA 2.6, flu, covid, strep negative, UA negative, CXR unrevealing. Patient has poor dentition. Patient was given vanc and zosyn. The case was discussed with Dr. Gore of [facility] and would like the patient transferred for evaluation. Suspicion for bacteremia with possible oral source ? Given 1L of IVF and HRs improved to the 110s at the time of this transfer call. Rest of vitals stable.       Objective     Vitals:    Recent Labs: All pertinent labs within the past 24 hours have been reviewed.  Recent imaging:    Airway:     Vent settings:         IV access:    Infusions:   Allergies:   Review of patient's allergies indicates:   Allergen Reactions    Clindamycin Anaphylaxis      NPO: No    Anticoagulation:   Anticoagulants       None             Instructions      [facility]-  Admit to Hospital Medicine  Upon arrival to the floor, please send a SecureChat to [facility] Med . If there are emergent issues, or if there is no response to the SecureChat within 15 minutes, call extension [phone] (if no answer, do NOT leave a callback number after the beep, rather please call the  to connect you  with the  Hospital Medicine admit line on-call physician), for Hospital Medicine admit team assignment and for additional admit orders for the patient. Do not page the attending physician associated with the patient on arrival (this physician may not be on duty at the time of arrival). Rather, always send a SecureChat to [facility] Med P (subsequently call [phone] if needed) to reach the triage physician for orders and team assignment.

## 2025-07-23 NOTE — PLAN OF CARE
[facility] - Cardiology Stepdown  Initial Discharge Assessment       Primary Care Provider: Ugo Pratt MD    Admission Diagnosis: Fever [R50.9]    Admission Date: 7/23/2025  Expected Discharge Date: 7/29/2025    Transition of Care Barriers: Underinsured    Payor: MEDICAID / Plan: [place] HL[employer] / Product Type: Managed Medicaid /     Extended Emergency Contact Information  Primary Emergency Contact: Minerva Aponte  Address: [address] [place] [address] [place] [address] [facility]  Home Phone: [phone]  Mobile Phone: [phone]  Relation: Mother    Discharge Plan A: Home with family  Discharge Plan B: Home      [facility] - [place], [place] - [address]  [address]  [place] [address]  Phone: [phone] Fax: [phone]      Initial Assessment (most recent)       Adult Discharge Assessment - 07/23/25 1439          Discharge Assessment    Assessment Type Discharge Planning Assessment     Confirmed/corrected address, phone number and insurance Yes     Confirmed Demographics Correct on Facesheet     Source of Information patient;family;health record     Communicated BRIANA with patient/caregiver Date not available/Unable to determine     People in Home parent(s);sibling(s)     Name(s) of People in Home Minerva Aponte (mother) [phone]     Facility Arrived From: [facility] [place]     Do you expect to return to your current living situation? Yes     Do you have help at home or someone to help you manage your care at home? Yes     Who are your caregiver(s) and their phone number(s)? Minerva Aponte (mother) [phone]     Prior to hospitilization cognitive status: Alert/Oriented     Current cognitive status: Alert/Oriented     Walking or Climbing Stairs Difficulty no     Dressing/Bathing Difficulty no     Equipment Currently Used at Home none     Readmission within 30 days? No     Patient currently being followed by  outpatient case management? No     Do you currently have service(s) that help you manage your care at home? No     Do you take prescription medications? Yes     Do you have prescription coverage? Yes     Do you have any problems affording any of your prescribed medications? No     Is the patient taking medications as prescribed? yes     Who is going to help you get home at discharge? Minerva Aponte (mother) [phone]     How do you get to doctors appointments? family or friend will provide     Are you on dialysis? No     Do you take coumadin? No     Discharge Plan A Home with family     Discharge Plan B Home     Discharge Plan discussed with: Patient;Parent(s)     Name(s) and Number(s) Minerva Aponte (mother) [phone]     Transition of Care Barriers Underinsured                   SW met with pt and mother at bedside to discuss discharge planning.  Pt lives with his mother and 18-year-old brother and is independent with ambulation and ADLs.  No DME, HH, dialysis, or coumadin.  PCP is Dr Ugo Pratt.  Pt will have transportation and assistance from his mother at discharge.  Discharge Plan A and Plan B have been determined by review of patient's clinical status, future medical and therapeutic needs, and coverage/benefits for post-acute care in coordination with multidisciplinary team members.  Will continue to follow.      Jamila Cai LMSW  [facility]  [id number]

## 2025-07-23 NOTE — HPI
"Per : "The patient is a 23 y/o male with PMH heart transplant in Sept 2014 on prednisone, prograf, and cellcept, HTN, and HLP. He presented to the referring facility with fevers to 104, body aches, and chills. In the ER, Tmax was 102.9 and tachy in the 140s. WBC 8, LA 2.6, flu, covid, strep negative, UA negative, CXR unrevealing. Patient has poor dentition. Patient was given vanc and zosyn. The case was discussed with Dr. Gore of [facility] and would like the patient transferred for evaluation. Suspicion for bacteremia with possible oral source ? Given 1L of IVF and HRs improved to the 110s at the time of this transfer call. Rest of vitals stable."    Patient reports left lower tooth pain for the past 4-5 days with the above associated fevers, body aches. Denies any sick contacts, diaphoresis, lightheadedness, HA, CP, cough, congestion, abdominal pain, n/v/d, urinary symptoms, changes in BMs, numbness/tingling, weakness.     "

## 2025-07-24 PROBLEM — K04.7 DENTAL INFECTION: Status: ACTIVE | Noted: 2025-07-24

## 2025-07-24 LAB
ABSOLUTE EOSINOPHIL (OHS): 0.25 K/UL
ABSOLUTE MONOCYTE (OHS): 1.04 K/UL (ref 0.3–1)
ABSOLUTE NEUTROPHIL COUNT (OHS): 6.41 K/UL (ref 1.8–7.7)
ALBUMIN SERPL BCP-MCNC: 3.8 G/DL (ref 3.5–5.2)
ALP SERPL-CCNC: 108 UNIT/L (ref 40–150)
ALT SERPL W/O P-5'-P-CCNC: 20 UNIT/L (ref 10–44)
ANION GAP (OHS): 10 MMOL/L (ref 8–16)
AST SERPL-CCNC: 17 UNIT/L (ref 11–45)
BASOPHILS # BLD AUTO: 0.03 K/UL
BASOPHILS NFR BLD AUTO: 0.3 %
BILIRUB SERPL-MCNC: 1 MG/DL (ref 0.1–1)
BUN SERPL-MCNC: 5 MG/DL (ref 6–20)
CALCIUM SERPL-MCNC: 8.2 MG/DL (ref 8.7–10.5)
CHLORIDE SERPL-SCNC: 102 MMOL/L (ref 95–110)
CO2 SERPL-SCNC: 23 MMOL/L (ref 23–29)
CREAT SERPL-MCNC: 0.9 MG/DL (ref 0.5–1.4)
ERYTHROCYTE [DISTWIDTH] IN BLOOD BY AUTOMATED COUNT: 13.3 % (ref 11.5–14.5)
GFR SERPLBLD CREATININE-BSD FMLA CKD-EPI: >60 ML/MIN/1.73/M2
GLUCOSE SERPL-MCNC: 93 MG/DL (ref 70–110)
HCT VFR BLD AUTO: 46.9 % (ref 40–54)
HGB BLD-MCNC: 16.1 GM/DL (ref 14–18)
HOLD SPECIMEN: NORMAL
IMM GRANULOCYTES # BLD AUTO: 0.05 K/UL (ref 0–0.04)
IMM GRANULOCYTES NFR BLD AUTO: 0.5 % (ref 0–0.5)
LYMPHOCYTES # BLD AUTO: 2.04 K/UL (ref 1–4.8)
MAGNESIUM SERPL-MCNC: 1.6 MG/DL (ref 1.6–2.6)
MCH RBC QN AUTO: 27.7 PG (ref 27–31)
MCHC RBC AUTO-ENTMCNC: 34.3 G/DL (ref 32–36)
MCV RBC AUTO: 81 FL (ref 82–98)
NUCLEATED RBC (/100WBC) (OHS): 0 /100 WBC
OHS QRS DURATION: 100 MS
OHS QTC CALCULATION: 427 MS
PHOSPHATE SERPL-MCNC: 1.3 MG/DL (ref 2.7–4.5)
PLATELET # BLD AUTO: 184 K/UL (ref 150–450)
PMV BLD AUTO: 11.1 FL (ref 9.2–12.9)
POTASSIUM SERPL-SCNC: 3.1 MMOL/L (ref 3.5–5.1)
PROT SERPL-MCNC: 6.7 GM/DL (ref 6–8.4)
RBC # BLD AUTO: 5.81 M/UL (ref 4.6–6.2)
RELATIVE EOSINOPHIL (OHS): 2.5 %
RELATIVE LYMPHOCYTE (OHS): 20.8 % (ref 18–48)
RELATIVE MONOCYTE (OHS): 10.6 % (ref 4–15)
RELATIVE NEUTROPHIL (OHS): 65.3 % (ref 38–73)
SODIUM SERPL-SCNC: 135 MMOL/L (ref 136–145)
TROPONIN I SERPL HS-MCNC: 26 NG/L
VANCOMYCIN TROUGH SERPL-MCNC: 15.5 UG/ML (ref 10–22)
WBC # BLD AUTO: 9.82 K/UL (ref 3.9–12.7)

## 2025-07-24 PROCEDURE — 25000003 PHARM REV CODE 250: Performed by: PHYSICIAN ASSISTANT

## 2025-07-24 PROCEDURE — 84100 ASSAY OF PHOSPHORUS: CPT | Performed by: PHYSICIAN ASSISTANT

## 2025-07-24 PROCEDURE — 93010 ELECTROCARDIOGRAM REPORT: CPT | Mod: ,,, | Performed by: INTERNAL MEDICINE

## 2025-07-24 PROCEDURE — 80202 ASSAY OF VANCOMYCIN: CPT | Performed by: HOSPITALIST

## 2025-07-24 PROCEDURE — 93005 ELECTROCARDIOGRAM TRACING: CPT

## 2025-07-24 PROCEDURE — 63600175 PHARM REV CODE 636 W HCPCS: Performed by: PHYSICIAN ASSISTANT

## 2025-07-24 PROCEDURE — 27000207 HC ISOLATION

## 2025-07-24 PROCEDURE — 85025 COMPLETE CBC W/AUTO DIFF WBC: CPT | Performed by: PHYSICIAN ASSISTANT

## 2025-07-24 PROCEDURE — 99900035 HC TECH TIME PER 15 MIN (STAT)

## 2025-07-24 PROCEDURE — 84484 ASSAY OF TROPONIN QUANT: CPT

## 2025-07-24 PROCEDURE — 25000003 PHARM REV CODE 250: Performed by: HOSPITALIST

## 2025-07-24 PROCEDURE — 99233 SBSQ HOSP IP/OBS HIGH 50: CPT | Mod: ,,, | Performed by: INTERNAL MEDICINE

## 2025-07-24 PROCEDURE — 11000001 HC ACUTE MED/SURG PRIVATE ROOM

## 2025-07-24 PROCEDURE — 94640 AIRWAY INHALATION TREATMENT: CPT

## 2025-07-24 PROCEDURE — 36415 COLL VENOUS BLD VENIPUNCTURE: CPT | Performed by: PHYSICIAN ASSISTANT

## 2025-07-24 PROCEDURE — 63600175 PHARM REV CODE 636 W HCPCS: Performed by: STUDENT IN AN ORGANIZED HEALTH CARE EDUCATION/TRAINING PROGRAM

## 2025-07-24 PROCEDURE — 83735 ASSAY OF MAGNESIUM: CPT | Performed by: PHYSICIAN ASSISTANT

## 2025-07-24 PROCEDURE — 25000003 PHARM REV CODE 250: Performed by: STUDENT IN AN ORGANIZED HEALTH CARE EDUCATION/TRAINING PROGRAM

## 2025-07-24 PROCEDURE — 63600175 PHARM REV CODE 636 W HCPCS: Performed by: HOSPITALIST

## 2025-07-24 PROCEDURE — 27000221 HC OXYGEN, UP TO 24 HOURS

## 2025-07-24 PROCEDURE — 94761 N-INVAS EAR/PLS OXIMETRY MLT: CPT

## 2025-07-24 PROCEDURE — 25000242 PHARM REV CODE 250 ALT 637 W/ HCPCS: Performed by: PHYSICIAN ASSISTANT

## 2025-07-24 PROCEDURE — 80053 COMPREHEN METABOLIC PANEL: CPT | Performed by: PHYSICIAN ASSISTANT

## 2025-07-24 RX ORDER — MAGNESIUM SULFATE HEPTAHYDRATE 40 MG/ML
2 INJECTION, SOLUTION INTRAVENOUS ONCE
Status: COMPLETED | OUTPATIENT
Start: 2025-07-24 | End: 2025-07-24

## 2025-07-24 RX ORDER — MUPIROCIN 20 MG/G
OINTMENT TOPICAL 2 TIMES DAILY
Status: DISCONTINUED | OUTPATIENT
Start: 2025-07-24 | End: 2025-07-25 | Stop reason: HOSPADM

## 2025-07-24 RX ADMIN — ASPIRIN 81 MG CHEWABLE TABLET 81 MG: 81 TABLET CHEWABLE at 08:07

## 2025-07-24 RX ADMIN — POTASSIUM & SODIUM PHOSPHATES POWDER PACK 280-160-250 MG 1 PACKET: 280-160-250 PACK at 06:07

## 2025-07-24 RX ADMIN — TACROLIMUS 1 MG: 1 CAPSULE ORAL at 06:07

## 2025-07-24 RX ADMIN — PIPERACILLIN SODIUM AND TAZOBACTAM SODIUM 4.5 G: 4; .5 INJECTION, POWDER, FOR SOLUTION INTRAVENOUS at 08:07

## 2025-07-24 RX ADMIN — PIPERACILLIN SODIUM AND TAZOBACTAM SODIUM 4.5 G: 4; .5 INJECTION, POWDER, FOR SOLUTION INTRAVENOUS at 01:07

## 2025-07-24 RX ADMIN — POLYETHYLENE GLYCOL 3350 17 G: 17 POWDER, FOR SOLUTION ORAL at 06:07

## 2025-07-24 RX ADMIN — ONDANSETRON 4 MG: 2 INJECTION INTRAMUSCULAR; INTRAVENOUS at 03:07

## 2025-07-24 RX ADMIN — HYDROXYZINE HYDROCHLORIDE 25 MG: 25 TABLET, FILM COATED ORAL at 09:07

## 2025-07-24 RX ADMIN — PRAVASTATIN SODIUM 40 MG: 40 TABLET ORAL at 09:07

## 2025-07-24 RX ADMIN — TACROLIMUS 1 MG: 1 CAPSULE ORAL at 08:07

## 2025-07-24 RX ADMIN — IPRATROPIUM BROMIDE AND ALBUTEROL SULFATE 3 ML: .5; 3 SOLUTION RESPIRATORY (INHALATION) at 03:07

## 2025-07-24 RX ADMIN — HYDROXYZINE HYDROCHLORIDE 25 MG: 25 TABLET, FILM COATED ORAL at 03:07

## 2025-07-24 RX ADMIN — POTASSIUM BICARBONATE 50 MEQ: 977.5 TABLET, EFFERVESCENT ORAL at 01:07

## 2025-07-24 RX ADMIN — SODIUM PHOSPHATE, MONOBASIC, MONOHYDRATE AND SODIUM PHOSPHATE, DIBASIC, ANHYDROUS 39.9 MMOL: 142; 276 INJECTION, SOLUTION INTRAVENOUS at 10:07

## 2025-07-24 RX ADMIN — MUPIROCIN: 20 OINTMENT TOPICAL at 11:07

## 2025-07-24 RX ADMIN — POTASSIUM BICARBONATE 50 MEQ: 977.5 TABLET, EFFERVESCENT ORAL at 08:07

## 2025-07-24 RX ADMIN — PREDNISONE 5 MG: 2.5 TABLET ORAL at 08:07

## 2025-07-24 RX ADMIN — MAGNESIUM SULFATE HEPTAHYDRATE 2 G: 40 INJECTION, SOLUTION INTRAVENOUS at 09:07

## 2025-07-24 RX ADMIN — PIPERACILLIN SODIUM AND TAZOBACTAM SODIUM 4.5 G: 4; .5 INJECTION, POWDER, FOR SOLUTION INTRAVENOUS at 05:07

## 2025-07-24 RX ADMIN — POTASSIUM BICARBONATE 50 MEQ: 977.5 TABLET, EFFERVESCENT ORAL at 09:07

## 2025-07-24 RX ADMIN — VANCOMYCIN HYDROCHLORIDE 1000 MG: 1 INJECTION, POWDER, LYOPHILIZED, FOR SOLUTION INTRAVENOUS at 01:07

## 2025-07-24 NOTE — PLAN OF CARE
Mr. Aponte is a 24-year-old gentleman with history of heart transplant in 2014 (on prednisone, tacrolimus, mycophenolate), hypertension.  He is admitted with sepsis.  Patient had fever, tachycardia to the 130s, tachypnea.  No leukocytosis.  He had lactic acidosis at time of admission.  The source was thought to be a tooth abscess.  CT neck negative for abscess. Did incidentally find a small right thyroid nodule. Chest x-ray negative for consolidation.  An echocardiogram was not able to find any evidence endocarditis.  Blood cultures have been negative x4 thus far.   Patient currently on vancomycin and Zosyn.

## 2025-07-24 NOTE — NURSING
Pt began coughing uncontrollably with c/o SOB. I elevated HOB to 90 degrees, placed O2 @ 2L and called RT to inform them and request a breathing tx. Pt sats were %, BP extremely high. On call was informed and ordered EKG (abnormal), CXR, and troponin. I gave pt atarax thinking it could possibly be anxiety. Pt has stopped coughing, and calmed slightly. Still c/o labored breathing did not hear any wheezing or crackles in lungs. Over all pt looks and sounds better, will continue to monitor pt status and notify oncoming RN of these events.

## 2025-07-24 NOTE — ASSESSMENT & PLAN NOTE
Patient has sepsis without organ dysfunction secondary to possible oral. A review of systems was completed. Patient's sepsis is stable    Current Antibiotics    piperacillin-tazobactam (ZOSYN) 4.5 g in D5W 100 mL IVPB (MB+), Every 8 hours (non-standard times), Intravenous    Lactate  Recent Labs   Lab 07/22/25  2349 07/23/25  0102   LACTATE 2.6* 1     Culture Data  Blood Cultures   Blood Culture   Date Value Ref Range Status   07/23/2025 No Growth After 24 Hours  Preliminary   07/23/2025 No Growth After 24 Hours  Preliminary   07/23/2025 No Growth After 24 Hours  Preliminary   07/23/2025 No Growth After 24 Hours  Preliminary      mSOFA  MSOFA Total  Min: 0   Min taken time: 07/24/25 1701  Max: 0   Max taken time: 07/24/25 1701    Plan  - Antibiotics as listed above  - Fluid resuscitation as follows:Fluid Not Needed - Patient is not hypotensive and/or lactate is less than 4.0.   - Trend lactate to resolution  - Follow up culture data  - Vasopressors were not needed  - The following services were consulted:  Heart transplant service and infectious diseases  - continue Zosyn with plan to transition to 2 week course of Augmentin if blood cultures remain negative

## 2025-07-24 NOTE — NURSING
Patient reported the regular pattern of BM at home was 3-4 days. RN offered miralax but patient refused. Will continue to monitor.

## 2025-07-24 NOTE — PLAN OF CARE
Problem: Adult Inpatient Plan of Care  Goal: Plan of Care Review  Outcome: Progressing     Problem: Adult Inpatient Plan of Care  Goal: Absence of Hospital-Acquired Illness or Injury  Outcome: Progressing     Problem: Sepsis/Septic Shock  Goal: Absence of Infection Signs and Symptoms  Outcome: Progressing     Problem: Neutropenia  Goal: Absence of Infection  Outcome: Progressing     Patient is AAOX4, VSS, NAD. Plan of care reviewed and explained. Patient verbalized understanding. Fall precautions maintained. Bed in low and locked position. Side rails up x 2, call light within reach.

## 2025-07-24 NOTE — HPI
24M s/p heart transplant 2014 on tacro/MMF/pred 5 who was transferred from outside facility after presenting to ER w/ fevers to 104 at home. Patient notes he has had worsening pain of R lower tooth for 3 days prior to presentation. Infectious work up has been otherwise unremarkable. He notes that he has had difficulty finding a dentist who will take his insurance, and hasn't been able to get dental care recently. He was started on vanc and pip-tazo on admission. Blood cx from admission are NGTD. Pt underwent CTA chest and CT A/P that were negative for acute pathology. TTE yesterday notes structurally normal valves. He notes feeling better today.

## 2025-07-24 NOTE — PLAN OF CARE
Pt maintained free from falls/trauma/injuries and skin breakdown. Pt denied pain or discomfort. Plan of care reviewed. Pt verbalized understanding. All questions and concerns addressed.       Problem: Adult Inpatient Plan of Care  Goal: Plan of Care Review  Outcome: Progressing  Goal: Patient-Specific Goal (Individualized)  Outcome: Progressing  Goal: Absence of Hospital-Acquired Illness or Injury  Outcome: Progressing  Goal: Optimal Comfort and Wellbeing  Outcome: Progressing  Goal: Readiness for Transition of Care  Outcome: Progressing     Problem: Wound  Goal: Optimal Coping  Outcome: Progressing  Goal: Optimal Functional Ability  Outcome: Progressing  Goal: Absence of Infection Signs and Symptoms  Outcome: Progressing  Goal: Improved Oral Intake  Outcome: Progressing  Goal: Optimal Pain Control and Function  Outcome: Progressing  Goal: Skin Health and Integrity  Outcome: Progressing  Goal: Optimal Wound Healing  Outcome: Progressing     Problem: Sepsis/Septic Shock  Goal: Optimal Coping  Outcome: Progressing  Goal: Absence of Bleeding  Outcome: Progressing  Goal: Blood Glucose Level Within Targeted Range  Outcome: Progressing  Goal: Absence of Infection Signs and Symptoms  Outcome: Progressing  Goal: Optimal Nutrition Intake  Outcome: Progressing     Problem: Neutropenia  Goal: Absence of Infection  Outcome: Progressing

## 2025-07-24 NOTE — CONSULTS
[facility] - Cardiology Stepdown  Infectious Disease  Consult Note    Patient Name: Wilmer Aponte  MRN: [id number]  Admission Date: 7/23/2025  Hospital Length of Stay: 1 days  Attending Physician: Dante King MD  Primary Care Provider: Ugo Pratt MD     Isolation Status: Enhanced Respiratory    Patient information was obtained from patient and ER records.      Inpatient consult to Infectious Diseases  Consult performed by: Paz Loomis DO  Consult ordered by: Dante King MD        Assessment/Plan:     ENT  Dental infection  24M s/p OHT 2014 (tac/MMF/Pred 5) presented w/ fever to 104, work up revealing R sided dental infection.    Recommendations:   - stop vanc  - continue pip-tazo  - await blood cx results to ensure he is not bacteremic  - follow fever curve  - if blood cx remain negative at 48h, pt can be discharged on augmentin 875 BID - please provide 14d course of PO abx on d/c, has appointment in August for dental extraction        Thank you for your consult. I will follow-up with patient. Please contact us if you have any additional questions.    Allison Loomis DO  Transplant Infectious Disease    Time: 75 minutes   50% of time spent on face-to-face counseling and coordination of care. Counseling included review of test results, diagnosis, and treatment plan with patient and/or family.        Subjective:     Principal Problem: Sepsis    HPI: 24M s/p heart transplant 2014 on tacro/MMF/pred 5 who was transferred from outside facility after presenting to ER w/ fevers to 104 at home. Patient notes he has had worsening pain of R lower tooth for 3 days prior to presentation. Infectious work up has been otherwise unremarkable. He notes that he has had difficulty finding a dentist who will take his insurance, and hasn't been able to get dental care recently. He was started on vanc and pip-tazo on admission. Blood cx from admission are NGTD. Pt underwent CTA chest and CT A/P that were  negative for acute pathology. TTE yesterday notes structurally normal valves. He notes feeling better today.     Past Medical History:   Diagnosis Date    Anxiety     Dilated cardiomyopathy     15 y/o    Encounter for blood transfusion     Gastrocutaneous fistula due to gastrostomy tube     Heart transplanted     9/29/14 at [place].  Donor CMV+/R-.  [place] [facility] 9/23-29.    Hypertension     Oral aversion     Status post orthotopic heart transplant        Past Surgical History:   Procedure Laterality Date    ANGIOGRAM, CORONARY, WITH LEFT HEART CATHETERIZATION N/A 3/25/2025    Procedure: Angiogram, Coronary, with Left Heart Cath;  Surgeon: Pranav Valles MD;  Location: [facility] CATH LAB;  Service: Cardiology;  Laterality: N/A;    BIOPSY WITH ULTRASOUND GUIDANCE Right 10/26/2021    Procedure: BIOPSY, WITH US GUIDANCE;  Surgeon: Kirsty Ward MD;  Location: [facility] CATH LAB;  Service: Cardiology;  Laterality: Right;    COMBINED RIGHT AND RETROGRADE LEFT HEART CATHETERIZATION FOR CONGENITAL HEART DEFECT N/A 4/22/2021    Procedure: CATHETERIZATION, HEART, COMBINED RIGHT AND RETROGRADE LEFT, FOR CONGENITAL HEART DEFECT;  Surgeon: Jose Burns Jr., MD;  Location: [facility] CATH LAB;  Service: Cardiology;  Laterality: N/A;  Pedi Heart    ESOPHAGOGASTRODUODENOSCOPY N/A 7/20/2021    Procedure: EGD (ESOPHAGOGASTRODUODENOSCOPY);  Surgeon: Nacho Martínez MD;  Location: [address]);  Service: Endoscopy;  Laterality: N/A;    EXTRACORPOREAL CIRCULATION      GASTROSTOMY TUBE PLACEMENT      HEART TRANSPLANT      LEFT VENTRICULAR ASSIST DEVICE      RIGHT HEART CATHETERIZATION Right 6/18/2021    Procedure: INSERTION, CATHETER, RIGHT HEART;  Surgeon: Mg Monroy MD;  Location: [facility] CATH LAB;  Service: Cardiology;  Laterality: Right;       Review of patient's allergies indicates:   Allergen Reactions    Clindamycin Anaphylaxis       Medications:  Facility-Administered Medications Prior to Admission   Medication     "sodium chloride 0.9% flush 10 mL     Medications Prior to Admission   Medication Sig    albuterol (PROVENTIL/VENTOLIN HFA) 90 mcg/actuation inhaler Inhale 2 puffs into the lungs every 4 (four) hours as needed.    amoxicillin (AMOXIL) 400 mg/5 mL suspension Take 25 mLs (2,000 mg total) by mouth As instructed (Take 2000 mg one hour prior to any dental procedure).    aspirin 81 MG Chew Take 1 tablet (81 mg total) by mouth once daily.    cholecalciferol, vitamin D3, (VITAMIN D3) 25 mcg (1,000 unit) capsule Take 1 capsule (1,000 Units total) by mouth once daily.    magnesium oxide (MAG-OX) 400 mg (241.3 mg magnesium) tablet Take 1 tablet (400 mg total) by mouth 2 (two) times daily.    mycophenolate mofetil (CELLCEPT) 200 mg/mL SusR TAKE 7.5 MLS by mouth Twice Daily    pravastatin (PRAVACHOL) 40 MG tablet Take 1 tablet (40 mg total) by mouth every evening.    predniSONE (DELTASONE) 5 MG tablet Take 1 tablet (5 mg total) by mouth once daily.    sulfamethoxazole-trimethoprim 400-80mg (BACTRIM,SEPTRA) 400-80 mg per tablet TAKE 1 TABLET BY MOUTH EVERY DAY    tacrolimus (PROGRAF) 1 MG Cap Take 1 capsule (1 mg total) by mouth every 12 (twelve) hours. Take sublingual     Antibiotics (From admission, onward)      Start     Stop Route Frequency Ordered    07/24/25 1115  mupirocin 2 % ointment         07/29/25 0859 Nasl 2 times daily 07/24/25 1011    07/23/25 1830  vancomycin (VANCOCIN) 1,000 mg in 0.9% NaCl 250 mL IVPB (admixture device)         -- IV Every 8 hours (non-standard times) 07/23/25 1326    07/23/25 1700  piperacillin-tazobactam (ZOSYN) 4.5 g in D5W 100 mL IVPB (MB+)         -- IV Every 8 hours (non-standard times) 07/23/25 1250    07/23/25 0850  vancomycin - pharmacy to dose  (vancomycin IVPB (PEDS and ADULTS))        Placed in "And" Linked Group    -- IV pharmacy to manage frequency 07/23/25 0752          Antifungals (From admission, onward)      None          Antivirals (From admission, onward)      None         "     Immunization History   Administered Date(s) Administered    COVID-19, MRNA, LN-S, PF (Pfizer) (Purple Cap) 07/23/2021       Family History       Problem Relation (Age of Onset)    Diabetes type II Mother    Dilated cardiomyopathy Father, Paternal Uncle          Social History     Socioeconomic History    Marital status: Single   Tobacco Use    Smoking status: Never    Smokeless tobacco: Never   Substance and Sexual Activity    Alcohol use: No    Drug use: No    Sexual activity: Never   Social History Narrative    Lives with family in [facility].     Social Drivers of Health     Financial Resource Strain: Low Risk  (7/23/2025)    Overall Financial Resource Strain (CARDIA)     Difficulty of Paying Living Expenses: Not very hard   Food Insecurity: No Food Insecurity (7/23/2025)    Hunger Vital Sign     Worried About Running Out of Food in the Last Year: Never true     Ran Out of Food in the Last Year: Never true   Transportation Needs: No Transportation Needs (7/23/2025)    PRAPARE - Transportation     Lack of Transportation (Medical): No     Lack of Transportation (Non-Medical): No   Physical Activity: Insufficiently Active (3/23/2025)    Exercise Vital Sign     Days of Exercise per Week: 2 days     Minutes of Exercise per Session: 20 min   Stress: No Stress Concern Present (7/23/2025)    [employer] [place] [employer] - Occupational Stress Questionnaire     Feeling of Stress : Not at all   Housing Stability: Low Risk  (7/23/2025)    Housing Stability Vital Sign     Unable to Pay for Housing in the Last Year: No     Number of Times Moved in the Last Year: 0     Homeless in the Last Year: No     Review of Systems   All other systems reviewed and are negative.    Objective:     Vital Signs (Most Recent):  Temp: 98.2 °F (36.8 °C) (07/24/25 1520)  Pulse: 93 (07/24/25 1520)  Resp: 18 (07/24/25 1520)  BP: 101/60 (07/24/25 1520)  SpO2: 97 % (07/24/25 1520) Vital Signs (24h Range):  Temp:  [98.1 °F (36.7  °C)-99.8 °F (37.7 °C)] 98.2 °F (36.8 °C)  Pulse:  [] 93  Resp:  [18-22] 18  SpO2:  [96 %-100 %] 97 %  BP: (101-123)/(60-80) 101/60     Weight: 68 kg (150 lb)  Body mass index is 21.52 kg/m².    Estimated Creatinine Clearance: 121.7 mL/min (based on SCr of 0.9 mg/dL).     Physical Exam  Constitutional:       General: He is not in acute distress.     Appearance: Normal appearance. He is well-developed. He is not ill-appearing or diaphoretic.   HENT:      Head: Normocephalic and atraumatic.      Right Ear: External ear normal.      Left Ear: External ear normal.      Nose: Nose normal.      Mouth/Throat:      Comments: Poor dentition w/ significant tooth decay of R lower tooth  Eyes:      General: No scleral icterus.        Right eye: No discharge.         Left eye: No discharge.      Extraocular Movements: Extraocular movements intact.      Conjunctiva/sclera: Conjunctivae normal.   Pulmonary:      Effort: Pulmonary effort is normal. No respiratory distress.      Breath sounds: No stridor.   Skin:     General: Skin is dry.      Coloration: Skin is not jaundiced or pale.      Findings: No erythema.   Neurological:      General: No focal deficit present.      Mental Status: He is alert and oriented to person, place, and time. Mental status is at baseline.   Psychiatric:         Mood and Affect: Mood normal.         Behavior: Behavior normal.         Thought Content: Thought content normal.         Judgment: Judgment normal.          Significant Labs: CBC:   Recent Labs   Lab 07/23/25  1027 07/24/25  0324   WBC 5.47 9.82   HGB 16.2 16.1   HCT 47.2 46.9    184     CMP:   Recent Labs   Lab 07/23/25  1027 07/24/25  0324   * 135*   K 3.2* 3.1*    102   CO2 23 23   * 93   BUN 5* 5*   CREATININE 0.8 0.9   CALCIUM 8.3* 8.2*   PROT 6.5 6.7   ALBUMIN 3.9 3.8   BILITOT 1.0 1.0   ALKPHOS 116 108   AST 13 17   ALT 18 20   ANIONGAP 9 10     Microbiology Results (last 7 days)       Procedure Component  Value Units Date/Time    Blood culture [[id number]]  (Normal) Collected: 07/23/25 1028    Order Status: Completed Specimen: Blood from Peripheral, Forearm, Right Updated: 07/24/25 1300     Blood Culture No Growth After 24 Hours    Blood culture [[id number]]  (Normal) Collected: 07/23/25 1031    Order Status: Completed Specimen: Blood from Peripheral, Lower Arm, Right Updated: 07/24/25 1300     Blood Culture No Growth After 24 Hours    Blood culture [[id number]]  (Normal) Collected: 07/23/25 1028    Order Status: Completed Specimen: Blood from Peripheral, Forearm, Right Updated: 07/24/25 1300     Blood Culture No Growth After 24 Hours    Blood culture [[id number]]  (Normal) Collected: 07/23/25 1031    Order Status: Completed Specimen: Blood from Peripheral, Lower Arm, Right Updated: 07/24/25 1300     Blood Culture No Growth After 24 Hours            Significant Imaging: I have reviewed all pertinent imaging results/findings within the past 24 hours.

## 2025-07-24 NOTE — ASSESSMENT & PLAN NOTE
- continue home prograf and prednisone  - holding cellcept in setting of infection  - BNP and Trop WNL  - Imaging at outside facility reviewed  - HTS consulted, appreciate recs   Followed protocol

## 2025-07-24 NOTE — ASSESSMENT & PLAN NOTE
Patient's blood pressure range in the last 24 hours was: BP  Min: 101/60  Max: 123/80.The patient's inpatient anti-hypertensive regimen is listed below:  Current Antihypertensives       Plan  - BP is controlled, no changes needed to their regimen  - Not on any antihypertensives

## 2025-07-24 NOTE — PROGRESS NOTES
"[facility] - Cardiology [facility] Medicine  Progress Note    Patient Name: Wilmer Aponte  MRN: [id number]  Patient Class: IP- Inpatient   Admission Date: 7/23/2025  Length of Stay: 1 days  Attending Physician: Dante King MD  Primary Care Provider: Ugo Pratt MD        Subjective     Principal Problem:Sepsis        HPI:  Per : "The patient is a 25 y/o male with PMH heart transplant in Sept 2014 on prednisone, prograf, and cellcept, HTN, and HLP. He presented to the referring facility with fevers to 104, body aches, and chills. In the ER, Tmax was 102.9 and tachy in the 140s. WBC 8, LA 2.6, flu, covid, strep negative, UA negative, CXR unrevealing. Patient has poor dentition. Patient was given vanc and zosyn. The case was discussed with Dr. Gore of [facility] and would like the patient transferred for evaluation. Suspicion for bacteremia with possible oral source ? Given 1L of IVF and HRs improved to the 110s at the time of this transfer call. Rest of vitals stable."    Patient reports left lower tooth pain for the past 4-5 days with the above associated fevers, body aches. Denies any sick contacts, diaphoresis, lightheadedness, HA, CP, cough, congestion, abdominal pain, n/v/d, urinary symptoms, changes in BMs, numbness/tingling, weakness.       Overview/Hospital Course:  Mr. Aponte is a 24-year-old gentleman with history of heart transplant in 2014 (on prednisone, tacrolimus, mycophenolate), hypertension.  He is admitted with sepsis.  Patient had fever, tachycardia to the 130s, tachypnea.  No leukocytosis.  He had lactic acidosis at time of admission.  The source was thought to be a tooth abscess.  CT neck negative for abscess. Did incidentally find a small right thyroid nodule. Chest x-ray negative for consolidation.  An echocardiogram was not able to find any evidence endocarditis.  Blood cultures have been negative x4 thus far.   Patient given doses of vancomycin and Zosyn.  Heart " transplant service and transplant infectious disease team were consulted.  ID recommended discontinuation of vancomycin.  Currently continues on Zosyn.  Recommended switching to Augmentin if blood cultures remain negative for 48 hours.    Interval History:  Fever curve appears to be improving.  Patient continues to complain of malaise but no focalizing symptoms.    Review of Systems   Constitutional:  Positive for activity change, chills, fatigue and fever. Negative for appetite change.   HENT:  Negative for congestion, rhinorrhea, sinus pressure, sinus pain and sore throat.    Eyes:  Negative for photophobia, pain and visual disturbance.   Respiratory:  Negative for cough, chest tightness, shortness of breath and wheezing.    Cardiovascular:  Negative for chest pain, palpitations and leg swelling.   Gastrointestinal:  Negative for abdominal distention, abdominal pain, constipation, diarrhea, nausea and vomiting.   Genitourinary:  Negative for dysuria, hematuria and urgency.   Musculoskeletal:  Positive for myalgias. Negative for arthralgias and joint swelling.   Skin:  Negative for color change, pallor and rash.   Neurological:  Negative for dizziness, weakness, numbness and headaches.   Hematological:  Does not bruise/bleed easily.   Psychiatric/Behavioral:  Negative for behavioral problems, confusion and hallucinations.      Objective:     Vital Signs (Most Recent):  Temp: 98.2 °F (36.8 °C) (07/24/25 1520)  Pulse: 93 (07/24/25 1520)  Resp: 18 (07/24/25 1520)  BP: 101/60 (07/24/25 1520)  SpO2: 97 % (07/24/25 1520) Vital Signs (24h Range):  Temp:  [98.1 °F (36.7 °C)-99.8 °F (37.7 °C)] 98.2 °F (36.8 °C)  Pulse:  [] 93  Resp:  [18-22] 18  SpO2:  [96 %-100 %] 97 %  BP: (101-123)/(60-80) 101/60     Weight: 68 kg (150 lb)  Body mass index is 21.52 kg/m².    Intake/Output Summary (Last 24 hours) at 7/24/2025 1712  Last data filed at 7/24/2025 1241  Gross per 24 hour   Intake 708 ml   Output 3625 ml   Net -2917 ml  "        Physical Exam  Vitals and nursing note reviewed.   Constitutional:       General: He is not in acute distress.     Appearance: He is well-developed.   HENT:      Head: Normocephalic and atraumatic.      Right Ear: External ear normal.      Left Ear: External ear normal.      Nose: Nose normal.   Eyes:      Conjunctiva/sclera: Conjunctivae normal.      Pupils: Pupils are equal, round, and reactive to light.   Cardiovascular:      Rate and Rhythm: Normal rate and regular rhythm.      Comments: Well-healed sternotomy scars over chest  Pulmonary:      Effort: Pulmonary effort is normal. No respiratory distress.      Breath sounds: Normal breath sounds. No wheezing or rales.   Abdominal:      General: Bowel sounds are normal. There is no distension.      Palpations: Abdomen is soft.      Tenderness: There is no abdominal tenderness.   Musculoskeletal:         General: No tenderness. Normal range of motion.      Cervical back: Normal range of motion and neck supple.   Skin:     General: Skin is warm and dry.   Neurological:      Mental Status: He is alert and oriented to person, place, and time.   Psychiatric:         Thought Content: Thought content normal.               Significant Labs: All pertinent labs within the past 24 hours have been reviewed.  Blood Culture: No results for input(s): "LABBLOO" in the last 48 hours.  CBC:   Recent Labs   Lab 07/23/25  1027 07/24/25  0324   WBC 5.47 9.82   HGB 16.2 16.1   HCT 47.2 46.9    184     CMP:   Recent Labs   Lab 07/23/25  1027 07/24/25  0324   * 135*   K 3.2* 3.1*    102   CO2 23 23   * 93   BUN 5* 5*   CREATININE 0.8 0.9   CALCIUM 8.3* 8.2*   PROT 6.5 6.7   ALBUMIN 3.9 3.8   BILITOT 1.0 1.0   ALKPHOS 116 108   AST 13 17   ALT 18 20   ANIONGAP 9 10       Significant Imaging: I have reviewed all pertinent imaging results/findings within the past 24 hours.      Assessment & Plan  Sepsis  Fever  Dental infection  Patient has sepsis without " organ dysfunction secondary to possible oral. A review of systems was completed. Patient's sepsis is stable    Current Antibiotics    piperacillin-tazobactam (ZOSYN) 4.5 g in D5W 100 mL IVPB (MB+), Every 8 hours (non-standard times), Intravenous    Lactate  Recent Labs   Lab 07/22/25  2349 07/23/25  0102   LACTATE 2.6* 1     Culture Data  Blood Cultures   Blood Culture   Date Value Ref Range Status   07/23/2025 No Growth After 24 Hours  Preliminary   07/23/2025 No Growth After 24 Hours  Preliminary   07/23/2025 No Growth After 24 Hours  Preliminary   07/23/2025 No Growth After 24 Hours  Preliminary      mSOFA  MSOFA Total  Min: 0   Min taken time: 07/24/25 1701  Max: 0   Max taken time: 07/24/25 1701    Plan  - Antibiotics as listed above  - Fluid resuscitation as follows:Fluid Not Needed - Patient is not hypotensive and/or lactate is less than 4.0.   - Trend lactate to resolution  - Follow up culture data  - Vasopressors were not needed  - The following services were consulted:  Heart transplant service and infectious diseases  - continue Zosyn with plan to transition to 2 week course of Augmentin if blood cultures remain negative            Heart transplanted  Immunosuppression due to drug therapy  Chronic combined systolic and diastolic heart failure  - continue home prograf and prednisone  - holding cellcept in setting of infection  - BNP and Trop WNL  - Imaging at outside facility reviewed  - HTS consulted, appreciate recs  Essential hypertension  Patient's blood pressure range in the last 24 hours was: BP  Min: 101/60  Max: 123/80.The patient's inpatient anti-hypertensive regimen is listed below:  Current Antihypertensives       Plan  - BP is controlled, no changes needed to their regimen  - Not on any antihypertensives  Anxiety  - not currently on any medications  - prn hydroxyzine    Other hyperlipidemia  - continue statin    Noncompliance with medications  - hx of noncompliance  - reports he has been taking  all of his medications     VTE Risk Mitigation (From admission, onward)           Ordered     IP VTE LOW RISK PATIENT  Once         07/23/25 0752     Place sequential compression device  Until discontinued         07/23/25 0752                    Discharge Planning   BRIANA: 7/25/2025     Code Status: Full Code   Medical Readiness for Discharge Date:   Discharge Plan A: Home with family                        Dante King MD  Department of Hospital Medicine   [facility] - Cardiology Stepdown

## 2025-07-24 NOTE — ASSESSMENT & PLAN NOTE
24M s/p OHT 2014 (tac/MMF/Pred 5) presented w/ fever to 104, work up revealing R sided dental infection.    Recommendations:   - stop vanc  - continue pip-tazo  - await blood cx results to ensure he is not bacteremic  - follow fever curve  - if blood cx remain negative at 48h, pt can be discharged on augmentin 875 BID - please provide 14d course of PO abx on d/c, has appointment in August for dental extraction

## 2025-07-24 NOTE — SUBJECTIVE & OBJECTIVE
Past Medical History:   Diagnosis Date    Anxiety     Dilated cardiomyopathy     15 y/o    Encounter for blood transfusion     Gastrocutaneous fistula due to gastrostomy tube     Heart transplanted     9/29/14 at [place].  Donor CMV+/R-.  [place] [facility] 9/23-29.    Hypertension     Oral aversion     Status post orthotopic heart transplant        Past Surgical History:   Procedure Laterality Date    ANGIOGRAM, CORONARY, WITH LEFT HEART CATHETERIZATION N/A 3/25/2025    Procedure: Angiogram, Coronary, with Left Heart Cath;  Surgeon: Pranav Valles MD;  Location: [facility] CATH LAB;  Service: Cardiology;  Laterality: N/A;    BIOPSY WITH ULTRASOUND GUIDANCE Right 10/26/2021    Procedure: BIOPSY, WITH US GUIDANCE;  Surgeon: Kirsty Ward MD;  Location: [facility] CATH LAB;  Service: Cardiology;  Laterality: Right;    COMBINED RIGHT AND RETROGRADE LEFT HEART CATHETERIZATION FOR CONGENITAL HEART DEFECT N/A 4/22/2021    Procedure: CATHETERIZATION, HEART, COMBINED RIGHT AND RETROGRADE LEFT, FOR CONGENITAL HEART DEFECT;  Surgeon: Jose Burns Jr., MD;  Location: [facility] CATH LAB;  Service: Cardiology;  Laterality: N/A;  Pedi Heart    ESOPHAGOGASTRODUODENOSCOPY N/A 7/20/2021    Procedure: EGD (ESOPHAGOGASTRODUODENOSCOPY);  Surgeon: Nacho Martínez MD;  Location: [facility] ENDO [address]);  Service: Endoscopy;  Laterality: N/A;    EXTRACORPOREAL CIRCULATION      GASTROSTOMY TUBE PLACEMENT      HEART TRANSPLANT      LEFT VENTRICULAR ASSIST DEVICE      RIGHT HEART CATHETERIZATION Right 6/18/2021    Procedure: INSERTION, CATHETER, RIGHT HEART;  Surgeon: Mg Monroy MD;  Location: [facility] CATH LAB;  Service: Cardiology;  Laterality: Right;       Review of patient's allergies indicates:   Allergen Reactions    Clindamycin Anaphylaxis       Medications:  Facility-Administered Medications Prior to Admission   Medication    sodium chloride 0.9% flush 10 mL     Medications Prior to Admission   Medication Sig    albuterol (PROVENTIL/VENTOLIN  "HFA) 90 mcg/actuation inhaler Inhale 2 puffs into the lungs every 4 (four) hours as needed.    amoxicillin (AMOXIL) 400 mg/5 mL suspension Take 25 mLs (2,000 mg total) by mouth As instructed (Take 2000 mg one hour prior to any dental procedure).    aspirin 81 MG Chew Take 1 tablet (81 mg total) by mouth once daily.    cholecalciferol, vitamin D3, (VITAMIN D3) 25 mcg (1,000 unit) capsule Take 1 capsule (1,000 Units total) by mouth once daily.    magnesium oxide (MAG-OX) 400 mg (241.3 mg magnesium) tablet Take 1 tablet (400 mg total) by mouth 2 (two) times daily.    mycophenolate mofetil (CELLCEPT) 200 mg/mL SusR TAKE 7.5 MLS by mouth Twice Daily    pravastatin (PRAVACHOL) 40 MG tablet Take 1 tablet (40 mg total) by mouth every evening.    predniSONE (DELTASONE) 5 MG tablet Take 1 tablet (5 mg total) by mouth once daily.    sulfamethoxazole-trimethoprim 400-80mg (BACTRIM,SEPTRA) 400-80 mg per tablet TAKE 1 TABLET BY MOUTH EVERY DAY    tacrolimus (PROGRAF) 1 MG Cap Take 1 capsule (1 mg total) by mouth every 12 (twelve) hours. Take sublingual     Antibiotics (From admission, onward)      Start     Stop Route Frequency Ordered    07/24/25 1115  mupirocin 2 % ointment         07/29/25 0859 Nasl 2 times daily 07/24/25 1011    07/23/25 1830  vancomycin (VANCOCIN) 1,000 mg in 0.9% NaCl 250 mL IVPB (admixture device)         -- IV Every 8 hours (non-standard times) 07/23/25 1326    07/23/25 1700  piperacillin-tazobactam (ZOSYN) 4.5 g in D5W 100 mL IVPB (MB+)         -- IV Every 8 hours (non-standard times) 07/23/25 1250    07/23/25 0850  vancomycin - pharmacy to dose  (vancomycin IVPB (PEDS and ADULTS))        Placed in "And" Linked Group    -- IV pharmacy to manage frequency 07/23/25 0752          Antifungals (From admission, onward)      None          Antivirals (From admission, onward)      None             Immunization History   Administered Date(s) Administered    COVID-19, MRNA, LN-S, PF (Pfizer) (Purple Cap) 07/23/2021 "       Family History       Problem Relation (Age of Onset)    Diabetes type II Mother    Dilated cardiomyopathy Father, Paternal Uncle          Social History     Socioeconomic History    Marital status: Single   Tobacco Use    Smoking status: Never    Smokeless tobacco: Never   Substance and Sexual Activity    Alcohol use: No    Drug use: No    Sexual activity: Never   Social History Narrative    Lives with family in [facility].     Social Drivers of Health     Financial Resource Strain: Low Risk  (7/23/2025)    Overall Financial Resource Strain (CARDIA)     Difficulty of Paying Living Expenses: Not very hard   Food Insecurity: No Food Insecurity (7/23/2025)    Hunger Vital Sign     Worried About Running Out of Food in the Last Year: Never true     Ran Out of Food in the Last Year: Never true   Transportation Needs: No Transportation Needs (7/23/2025)    PRAPARE - Transportation     Lack of Transportation (Medical): No     Lack of Transportation (Non-Medical): No   Physical Activity: Insufficiently Active (3/23/2025)    Exercise Vital Sign     Days of Exercise per Week: 2 days     Minutes of Exercise per Session: 20 min   Stress: No Stress Concern Present (7/23/2025)    [employer] - Occupational Stress Questionnaire     Feeling of Stress : Not at all   Housing Stability: Low Risk  (7/23/2025)    Housing Stability Vital Sign     Unable to Pay for Housing in the Last Year: No     Number of Times Moved in the Last Year: 0     Homeless in the Last Year: No     Review of Systems   All other systems reviewed and are negative.    Objective:     Vital Signs (Most Recent):  Temp: 98.2 °F (36.8 °C) (07/24/25 1520)  Pulse: 93 (07/24/25 1520)  Resp: 18 (07/24/25 1520)  BP: 101/60 (07/24/25 1520)  SpO2: 97 % (07/24/25 1520) Vital Signs (24h Range):  Temp:  [98.1 °F (36.7 °C)-99.8 °F (37.7 °C)] 98.2 °F (36.8 °C)  Pulse:  [] 93  Resp:  [18-22] 18  SpO2:  [96 %-100 %] 97 %  BP:  (101-123)/(60-80) 101/60     Weight: 68 kg (150 lb)  Body mass index is 21.52 kg/m².    Estimated Creatinine Clearance: 121.7 mL/min (based on SCr of 0.9 mg/dL).     Physical Exam  Constitutional:       General: He is not in acute distress.     Appearance: Normal appearance. He is well-developed. He is not ill-appearing or diaphoretic.   HENT:      Head: Normocephalic and atraumatic.      Right Ear: External ear normal.      Left Ear: External ear normal.      Nose: Nose normal.      Mouth/Throat:      Comments: Poor dentition w/ significant tooth decay of R lower tooth  Eyes:      General: No scleral icterus.        Right eye: No discharge.         Left eye: No discharge.      Extraocular Movements: Extraocular movements intact.      Conjunctiva/sclera: Conjunctivae normal.   Pulmonary:      Effort: Pulmonary effort is normal. No respiratory distress.      Breath sounds: No stridor.   Skin:     General: Skin is dry.      Coloration: Skin is not jaundiced or pale.      Findings: No erythema.   Neurological:      General: No focal deficit present.      Mental Status: He is alert and oriented to person, place, and time. Mental status is at baseline.   Psychiatric:         Mood and Affect: Mood normal.         Behavior: Behavior normal.         Thought Content: Thought content normal.         Judgment: Judgment normal.          Significant Labs: CBC:   Recent Labs   Lab 07/23/25  1027 07/24/25  0324   WBC 5.47 9.82   HGB 16.2 16.1   HCT 47.2 46.9    184     CMP:   Recent Labs   Lab 07/23/25  1027 07/24/25  0324   * 135*   K 3.2* 3.1*    102   CO2 23 23   * 93   BUN 5* 5*   CREATININE 0.8 0.9   CALCIUM 8.3* 8.2*   PROT 6.5 6.7   ALBUMIN 3.9 3.8   BILITOT 1.0 1.0   ALKPHOS 116 108   AST 13 17   ALT 18 20   ANIONGAP 9 10     Microbiology Results (last 7 days)       Procedure Component Value Units Date/Time    Blood culture [[id number]]  (Normal) Collected: 07/23/25 1028    Order Status: Completed  Specimen: Blood from Peripheral, Forearm, Right Updated: 07/24/25 1300     Blood Culture No Growth After 24 Hours    Blood culture [[id number]]  (Normal) Collected: 07/23/25 1031    Order Status: Completed Specimen: Blood from Peripheral, Lower Arm, Right Updated: 07/24/25 1300     Blood Culture No Growth After 24 Hours    Blood culture [[id number]]  (Normal) Collected: 07/23/25 1028    Order Status: Completed Specimen: Blood from Peripheral, Forearm, Right Updated: 07/24/25 1300     Blood Culture No Growth After 24 Hours    Blood culture [[id number]]  (Normal) Collected: 07/23/25 1031    Order Status: Completed Specimen: Blood from Peripheral, Lower Arm, Right Updated: 07/24/25 1300     Blood Culture No Growth After 24 Hours            Significant Imaging: I have reviewed all pertinent imaging results/findings within the past 24 hours.

## 2025-07-24 NOTE — PLAN OF CARE
07/24/25 1207   Rounds   Attendance Provider;;Assigned nurse;Charge nurse  (unit-based AB)   Discharge Plan A Home with family   Why the patient remains in the hospital Requires continued medical care   Transition of Care Barriers Underinsured     Sepsis might be from infected tooth which can be addressed with PO abx and dental follow-up.  BRIANA tomorrow 7/25.      Jamila Cai LMSW  [facility]  [id number]

## 2025-07-24 NOTE — CARE UPDATE
INFECTIOUS DISEASE INITIAL CONSULTATION     Reason for consult: L foot abscess/cellulitis     CHIEF COMPLAINT  L foot wound     HISTORY OF PRESENT ILLNESS  This is a 41 yo M w/ pmh obesity who presented to the hospital for a left foot wound. Patient reported a blister developed over the top of his L foot about a week ago. It popped and wound developed. He went to urgent care, was prescribed Keflex w/out improvement. Reported some chills. No fevers in ED. WBC 11.7 on admission, esr 52, crp 5.2, 5.1. XR neg for OM. Sp bedside I&D by podiatry. Cultures sent.       PAST MEDICAL HISTORY    No past medical history on file.    PAST SURGICAL HISTORY    No past surgical history on file.    MEDICATIONS  Current Facility-Administered Medications   Medication    vancomycin 1,500 mg in sodium chloride 0.9% 250 mL IVPB    piperacillin-tazobactam (ZOSYN) 4.5 g in sodium chloride 0.9 % 100 mL IVPB    sodium chloride 0.9 % flush bag 25 mL    sodium chloride 0.9 % injection 2 mL    dextrose 50 % injection 25 g    dextrose 50 % injection 12.5 g    glucagon (GLUCAGEN) injection 1 mg    dextrose (GLUTOSE) 40 % gel 15 g    dextrose (GLUTOSE) 40 % gel 30 g    heparin (porcine) injection 5,000 Units    acetaminophen (TYLENOL) tablet 650 mg    Or    acetaminophen (TYLENOL) suppository 650 mg    ondansetron (ZOFRAN ODT) disintegrating tablet 4 mg    Or    ondansetron (ZOFRAN) injection 4 mg    gabapentin (NEURONTIN) capsule 100 mg    polyethylene glycol (MIRALAX) packet 17 g    docusate sodium-sennosides (SENOKOT S) 50-8.6 MG 2 tablet    bisacodyl (DULCOLAX) suppository 10 mg    magnesium hydroxide (MILK OF MAGNESIA) 400 MG/5ML suspension 30 mL    melatonin tablet 3 mg    Potassium Standard Replacement Protocol (Levels 3.5 and lower)    Magnesium Standard Replacement Protocol    insulin lispro (ADMELOG,HumaLOG) - Correction Dose    insulin lispro (ADMELOG,HumaLOG) - Correction Dose    HYDROcodone-acetaminophen (NORCO) 5-325 MG per tablet  Unit AB Care Support Interaction      I have reviewed the chart of Wilmer Aponte who is hospitalized for Sepsis. The patient is currently located in the following unit: CSU        I have assisted the primary physician in management of the following:      MRSA Decolonization - Mupirocin ordered and CHG ordered     Nikki Sanchez PA-C  Unit Based AB       1 tablet    VANCOMYCIN - PHARMACIST MONITORED Misc    piperacillin-tazobactam (ZOSYN) 3.375 g in sodium chloride 0.9 % 100 mL IVPB    hydrALAZINE (APRESOLINE) injection 10 mg     No current outpatient medications on file.        ALLERGY  ALLERGIES:  No Known Allergies                 SOCIAL HISTORY         FAMILY HISTORY    No family history on file.    Review of Systems  10 point ROS conducted.  As per HPI, rest of ROS is negative      PHYSICAL EXAMINATION  Vitals:    12/05/23 2256 12/06/23 0250 12/06/23 0530 12/06/23 1058   BP: (!) 150/89 131/86 130/88 (!) 141/84   BP Location:  LUE - Left upper extremity LUE - Left upper extremity RUE - Right upper extremity   Patient Position:  Sitting Sitting Sitting/High-Gonzalez's   Pulse: 85 76 77 87   Resp: 16 14 14 18   Temp:  98.6 °F (37 °C)  97.7 °F (36.5 °C)   TempSrc:  Oral  Oral   SpO2: 99% 98% 97% 98%       Gen: NAD, AOx3, answering questions appropriately  Head: NC, AT  Neck: Supple, no TTP  ENT: Normal oropharynx, MMM  Eyes: PERRL, EOMI  CVS: RRR, no murmurs  Lung: CTA B, normal work of breathing  Abd: Soft, nontender, nondistended  Extr: Normal joints, no edema, well perfused  L foot w/ edema, erythema up to ankle. Ventral foot wound w/ packing. Ttp +.  Skin: Warm, dry    REVIEW OF LABORATORY, PATHOLOGY, AND RADIOLOGY DATA  Lab results:  CBC:  Lab Results   Component Value Date    WBC 11.7 (H) 12/05/2023    RBC 5.06 12/05/2023    HGB 14.5 12/05/2023    HCT 45.2 12/05/2023    MCV 89.3 12/05/2023    MCH 28.7 12/05/2023    MCHC 32.1 12/05/2023     12/05/2023    ABASO 0.1 12/05/2023       CMP:  Recent Labs   Lab 12/05/23 1927   SODIUM 137   POTASSIUM 4.0   CHLORIDE 105   CO2 27   BUN 11   CREATININE 0.72   GLUCOSE 120*   CALCIUM 9.3       Microbiology results:    Microbiology Results       None            RADIOLOGY: All imaging reviewed    ASSESSMENT   -L foot cellulitis/abscess   -sp bedside I&D 12/6- cx pending   -on Keflex pta  -  Obesity    RECOMMENDATIONS  -Continue vanc, Zosyn  - f/u cx  - monitor labs  - podiatry following   -Further recommendations to follow  - Prior notes, imaging and labs reviewed    Thank you very much for this consultation.  This assessment and recommendations were discussed with the attending physician: Dr. Bray.  We will continue to follow this patient with you and assist in the management in any way that we can.    Jessa Price MD

## 2025-07-24 NOTE — SUBJECTIVE & OBJECTIVE
Interval History:  Fever curve appears to be improving.  Patient continues to complain of malaise but no focalizing symptoms.    Review of Systems   Constitutional:  Positive for activity change, chills, fatigue and fever. Negative for appetite change.   HENT:  Negative for congestion, rhinorrhea, sinus pressure, sinus pain and sore throat.    Eyes:  Negative for photophobia, pain and visual disturbance.   Respiratory:  Negative for cough, chest tightness, shortness of breath and wheezing.    Cardiovascular:  Negative for chest pain, palpitations and leg swelling.   Gastrointestinal:  Negative for abdominal distention, abdominal pain, constipation, diarrhea, nausea and vomiting.   Genitourinary:  Negative for dysuria, hematuria and urgency.   Musculoskeletal:  Positive for myalgias. Negative for arthralgias and joint swelling.   Skin:  Negative for color change, pallor and rash.   Neurological:  Negative for dizziness, weakness, numbness and headaches.   Hematological:  Does not bruise/bleed easily.   Psychiatric/Behavioral:  Negative for behavioral problems, confusion and hallucinations.      Objective:     Vital Signs (Most Recent):  Temp: 98.2 °F (36.8 °C) (07/24/25 1520)  Pulse: 93 (07/24/25 1520)  Resp: 18 (07/24/25 1520)  BP: 101/60 (07/24/25 1520)  SpO2: 97 % (07/24/25 1520) Vital Signs (24h Range):  Temp:  [98.1 °F (36.7 °C)-99.8 °F (37.7 °C)] 98.2 °F (36.8 °C)  Pulse:  [] 93  Resp:  [18-22] 18  SpO2:  [96 %-100 %] 97 %  BP: (101-123)/(60-80) 101/60     Weight: 68 kg (150 lb)  Body mass index is 21.52 kg/m².    Intake/Output Summary (Last 24 hours) at 7/24/2025 1712  Last data filed at 7/24/2025 1241  Gross per 24 hour   Intake 708 ml   Output 3625 ml   Net -2917 ml         Physical Exam  Vitals and nursing note reviewed.   Constitutional:       General: He is not in acute distress.     Appearance: He is well-developed.   HENT:      Head: Normocephalic and atraumatic.      Right Ear: External ear  "normal.      Left Ear: External ear normal.      Nose: Nose normal.   Eyes:      Conjunctiva/sclera: Conjunctivae normal.      Pupils: Pupils are equal, round, and reactive to light.   Cardiovascular:      Rate and Rhythm: Normal rate and regular rhythm.      Comments: Well-healed sternotomy scars over chest  Pulmonary:      Effort: Pulmonary effort is normal. No respiratory distress.      Breath sounds: Normal breath sounds. No wheezing or rales.   Abdominal:      General: Bowel sounds are normal. There is no distension.      Palpations: Abdomen is soft.      Tenderness: There is no abdominal tenderness.   Musculoskeletal:         General: No tenderness. Normal range of motion.      Cervical back: Normal range of motion and neck supple.   Skin:     General: Skin is warm and dry.   Neurological:      Mental Status: He is alert and oriented to person, place, and time.   Psychiatric:         Thought Content: Thought content normal.               Significant Labs: All pertinent labs within the past 24 hours have been reviewed.  Blood Culture: No results for input(s): "LABBLOO" in the last 48 hours.  CBC:   Recent Labs   Lab 07/23/25  1027 07/24/25  0324   WBC 5.47 9.82   HGB 16.2 16.1   HCT 47.2 46.9    184     CMP:   Recent Labs   Lab 07/23/25  1027 07/24/25  0324   * 135*   K 3.2* 3.1*    102   CO2 23 23   * 93   BUN 5* 5*   CREATININE 0.8 0.9   CALCIUM 8.3* 8.2*   PROT 6.5 6.7   ALBUMIN 3.9 3.8   BILITOT 1.0 1.0   ALKPHOS 116 108   AST 13 17   ALT 18 20   ANIONGAP 9 10       Significant Imaging: I have reviewed all pertinent imaging results/findings within the past 24 hours.  "

## 2025-07-24 NOTE — PROGRESS NOTES
Pharmacokinetic Assessment Follow Up: IV Vancomycin    Vancomycin serum concentration assessment(s):    Vanc T = 15.5 mcg/mL. At goal    Vancomycin Regimen Plan:  Continue vancomycin 1000 mg IV q8h  Repeat trough in AM 7/26 prior to dose  Goal trough = 15-20 mcg/mL    Thank you for the consult, will continue to follow    Chicho Mazariegos.D., BCPS  [id number]         Patient brief summary:  Wilmer Aponte is a 24 y.o. male initiated on antimicrobial therapy with IV Vancomycin for treatment of sepsis    Drug levels (last 3 results):  Recent Labs   Lab Result Units 07/24/25  0934   Vancomycin Trough ug/ml 15.5       Drug Allergies:   Review of patient's allergies indicates:   Allergen Reactions    Clindamycin Anaphylaxis       Renal Function:   Estimated Creatinine Clearance: 121.7 mL/min (based on SCr of 0.9 mg/dL).,       CBC (last 72 hours):  Recent Labs   Lab Result Units 07/23/25  1027 07/24/25  0324   WBC K/uL 5.47 9.82   HGB gm/dL 16.2 16.1   HCT % 47.2 46.9   Platelet Count K/uL 157 184   Lymph % % 10.6* 20.8   Mono % % 11.2 10.6   Eos % % 0.4 2.5   Basophil % % 0.4 0.3       Metabolic Panel (last 72 hours):  Recent Labs   Lab Result Units 07/22/25  2319 07/23/25  1027 07/23/25  1029 07/24/25  0324   Sodium mmol/L  --  135*  --  135*   Potassium mmol/L  --  3.2*  --  3.1*   Chloride mmol/L  --  103  --  102   CO2 mmol/L  --  23  --  23   Glucose mg/dL  --  119*  --  93   Glucose, UA   --   --  2+*  --    BUN mg/dL  --  5*  --  5*   Creatinine mg/dL  --  0.8  --  0.9   Albumin g/dL  --  3.9  --  3.8   Bilirubin Total mg/dL  --  1.0  --  1.0   ALP unit/L  --  116  --  108   AST unit/L  --  13  --  17   ALT unit/L  --  18  --  20   Magnesium  mg/dL  --  1.4*  --  1.6   Magnesium Level MG/DL 1.5*  --   --   --    Phosphorus Level mg/dL  --  <1.0*  --  1.3*       Vancomycin Administrations:  vancomycin given in the last 96 hours                     vancomycin (VANCOCIN) 1,000 mg in 0.9% NaCl 250 mL IVPB (admixture  device) (mg) 1,000 mg New Bag 07/24/25 0136     1,000 mg New Bag 07/23/25 1822    vancomycin 1,500 mg in 0.9% NaCl 250 mL IVPB (admixture device) (mg) 1,500 mg New Bag 07/23/25 1027                    Microbiologic Results:  Microbiology Results (last 7 days)       Procedure Component Value Units Date/Time    Blood culture [[id number]]  (Normal) Collected: 07/23/25 1028    Order Status: Completed Specimen: Blood from Peripheral, Forearm, Right Updated: 07/23/25 1900     Blood Culture No Growth After 6 Hours    Blood culture [[id number]]  (Normal) Collected: 07/23/25 1031    Order Status: Completed Specimen: Blood from Peripheral, Lower Arm, Right Updated: 07/23/25 1900     Blood Culture No Growth After 6 Hours    Blood culture [[id number]]  (Normal) Collected: 07/23/25 1028    Order Status: Completed Specimen: Blood from Peripheral, Forearm, Right Updated: 07/23/25 1900     Blood Culture No Growth After 6 Hours    Blood culture [[id number]]  (Normal) Collected: 07/23/25 1031    Order Status: Completed Specimen: Blood from Peripheral, Lower Arm, Right Updated: 07/23/25 1900     Blood Culture No Growth After 6 Hours

## 2025-07-25 ENCOUNTER — TELEPHONE (OUTPATIENT)
Dept: TRANSPLANT | Facility: CLINIC | Age: 25
End: 2025-07-25
Payer: MEDICAID

## 2025-07-25 VITALS
OXYGEN SATURATION: 98 % | HEART RATE: 97 BPM | DIASTOLIC BLOOD PRESSURE: 63 MMHG | TEMPERATURE: 98 F | SYSTOLIC BLOOD PRESSURE: 120 MMHG | BODY MASS INDEX: 21.47 KG/M2 | WEIGHT: 150 LBS | RESPIRATION RATE: 18 BRPM | HEIGHT: 70 IN

## 2025-07-25 DIAGNOSIS — E04.1 THYROID NODULE GREATER THAN OR EQUAL TO 1 CM IN DIAMETER INCIDENTALLY NOTED ON IMAGING STUDY: Primary | ICD-10-CM

## 2025-07-25 DIAGNOSIS — Z94.1 STATUS POST HEART TRANSPLANT: ICD-10-CM

## 2025-07-25 DIAGNOSIS — Z79.899 ENCOUNTER FOR LONG-TERM (CURRENT) USE OF MEDICATIONS: ICD-10-CM

## 2025-07-25 PROBLEM — A41.9 SEPSIS: Status: RESOLVED | Noted: 2025-07-23 | Resolved: 2025-07-25

## 2025-07-25 PROBLEM — R50.9 FEVER: Status: RESOLVED | Noted: 2025-07-23 | Resolved: 2025-07-25

## 2025-07-25 LAB
ABSOLUTE EOSINOPHIL (OHS): 0.38 K/UL
ABSOLUTE MONOCYTE (OHS): 0.83 K/UL (ref 0.3–1)
ABSOLUTE NEUTROPHIL COUNT (OHS): 2.95 K/UL (ref 1.8–7.7)
ALBUMIN SERPL BCP-MCNC: 3.3 G/DL (ref 3.5–5.2)
ALP SERPL-CCNC: 86 UNIT/L (ref 40–150)
ALT SERPL W/O P-5'-P-CCNC: 18 UNIT/L (ref 0–55)
ANION GAP (OHS): 9 MMOL/L (ref 8–16)
AST SERPL-CCNC: 25 UNIT/L (ref 0–50)
BASOPHILS # BLD AUTO: 0.09 K/UL
BASOPHILS NFR BLD AUTO: 1.4 %
BILIRUB SERPL-MCNC: 0.6 MG/DL (ref 0.1–1)
BUN SERPL-MCNC: 5 MG/DL (ref 6–20)
CALCIUM SERPL-MCNC: 8.1 MG/DL (ref 8.7–10.5)
CHLORIDE SERPL-SCNC: 109 MMOL/L (ref 95–110)
CO2 SERPL-SCNC: 18 MMOL/L (ref 23–29)
CREAT SERPL-MCNC: 0.8 MG/DL (ref 0.5–1.4)
CYTOMEGALOVIRUS DNA, QUAL (OHS): NOT DETECTED
ERYTHROCYTE [DISTWIDTH] IN BLOOD BY AUTOMATED COUNT: 13.3 % (ref 11.5–14.5)
GFR SERPLBLD CREATININE-BSD FMLA CKD-EPI: >60 ML/MIN/1.73/M2
GLUCOSE SERPL-MCNC: 84 MG/DL (ref 70–110)
HCT VFR BLD AUTO: 51.3 % (ref 40–54)
HGB BLD-MCNC: 16.2 GM/DL (ref 14–18)
IMM GRANULOCYTES # BLD AUTO: 0.05 K/UL (ref 0–0.04)
IMM GRANULOCYTES NFR BLD AUTO: 0.8 % (ref 0–0.5)
LYMPHOCYTES # BLD AUTO: 1.97 K/UL (ref 1–4.8)
MAGNESIUM SERPL-MCNC: 1.7 MG/DL (ref 1.6–2.6)
MCH RBC QN AUTO: 27.7 PG (ref 27–31)
MCHC RBC AUTO-ENTMCNC: 31.6 G/DL (ref 32–36)
MCV RBC AUTO: 88 FL (ref 82–98)
NUCLEATED RBC (/100WBC) (OHS): 0 /100 WBC
PHOSPHATE SERPL-MCNC: 3.5 MG/DL (ref 2.7–4.5)
PLATELET # BLD AUTO: 178 K/UL (ref 150–450)
PMV BLD AUTO: 10.2 FL (ref 9.2–12.9)
POTASSIUM SERPL-SCNC: 4.1 MMOL/L (ref 3.5–5.1)
PROT SERPL-MCNC: 6.4 GM/DL (ref 6–8.4)
RBC # BLD AUTO: 5.85 M/UL (ref 4.6–6.2)
RELATIVE EOSINOPHIL (OHS): 6.1 %
RELATIVE LYMPHOCYTE (OHS): 31.4 % (ref 18–48)
RELATIVE MONOCYTE (OHS): 13.2 % (ref 4–15)
RELATIVE NEUTROPHIL (OHS): 47.1 % (ref 38–73)
SODIUM SERPL-SCNC: 136 MMOL/L (ref 136–145)
WBC # BLD AUTO: 6.27 K/UL (ref 3.9–12.7)

## 2025-07-25 PROCEDURE — 85025 COMPLETE CBC W/AUTO DIFF WBC: CPT | Performed by: PHYSICIAN ASSISTANT

## 2025-07-25 PROCEDURE — 84100 ASSAY OF PHOSPHORUS: CPT | Performed by: PHYSICIAN ASSISTANT

## 2025-07-25 PROCEDURE — 25000003 PHARM REV CODE 250: Performed by: HOSPITALIST

## 2025-07-25 PROCEDURE — 99222 1ST HOSP IP/OBS MODERATE 55: CPT | Mod: ,,, | Performed by: INTERNAL MEDICINE

## 2025-07-25 PROCEDURE — 80053 COMPREHEN METABOLIC PANEL: CPT | Performed by: PHYSICIAN ASSISTANT

## 2025-07-25 PROCEDURE — 63600175 PHARM REV CODE 636 W HCPCS: Performed by: HOSPITALIST

## 2025-07-25 PROCEDURE — 63600175 PHARM REV CODE 636 W HCPCS: Performed by: STUDENT IN AN ORGANIZED HEALTH CARE EDUCATION/TRAINING PROGRAM

## 2025-07-25 PROCEDURE — 83735 ASSAY OF MAGNESIUM: CPT | Performed by: PHYSICIAN ASSISTANT

## 2025-07-25 PROCEDURE — 36415 COLL VENOUS BLD VENIPUNCTURE: CPT | Performed by: PHYSICIAN ASSISTANT

## 2025-07-25 PROCEDURE — 63600175 PHARM REV CODE 636 W HCPCS: Performed by: PHYSICIAN ASSISTANT

## 2025-07-25 PROCEDURE — 25000003 PHARM REV CODE 250: Performed by: PHYSICIAN ASSISTANT

## 2025-07-25 RX ORDER — AMOXICILLIN AND CLAVULANATE POTASSIUM 400; 57 MG/5ML; MG/5ML
800 POWDER, FOR SUSPENSION ORAL EVERY 12 HOURS
Status: DISCONTINUED | OUTPATIENT
Start: 2025-07-25 | End: 2025-07-25 | Stop reason: HOSPADM

## 2025-07-25 RX ORDER — HYDROXYZINE HYDROCHLORIDE 25 MG/1
25 TABLET, FILM COATED ORAL 3 TIMES DAILY PRN
Qty: 14 TABLET | Refills: 0 | Status: SHIPPED | OUTPATIENT
Start: 2025-07-25 | End: 2025-08-08

## 2025-07-25 RX ORDER — MYCOPHENOLATE MOFETIL 200 MG/ML
POWDER, FOR SUSPENSION ORAL
Qty: 480 ML | Refills: 11 | Status: SHIPPED | OUTPATIENT
Start: 2025-07-25

## 2025-07-25 RX ORDER — MYCOPHENOLATE MOFETIL 200 MG/ML
1000 POWDER, FOR SUSPENSION ORAL 2 TIMES DAILY
Status: DISCONTINUED | OUTPATIENT
Start: 2025-07-25 | End: 2025-07-25 | Stop reason: HOSPADM

## 2025-07-25 RX ORDER — MAGNESIUM SULFATE HEPTAHYDRATE 40 MG/ML
2 INJECTION, SOLUTION INTRAVENOUS ONCE
Status: COMPLETED | OUTPATIENT
Start: 2025-07-25 | End: 2025-07-25

## 2025-07-25 RX ORDER — AMOXICILLIN AND CLAVULANATE POTASSIUM 250; 62.5 MG/5ML; MG/5ML
500 POWDER, FOR SUSPENSION ORAL EVERY 8 HOURS
Qty: 450 ML | Refills: 0 | Status: SHIPPED | OUTPATIENT
Start: 2025-07-25 | End: 2025-08-08

## 2025-07-25 RX ADMIN — ACETAMINOPHEN 650 MG: 325 TABLET ORAL at 06:07

## 2025-07-25 RX ADMIN — MAGNESIUM SULFATE HEPTAHYDRATE 2 G: 40 INJECTION, SOLUTION INTRAVENOUS at 08:07

## 2025-07-25 RX ADMIN — PIPERACILLIN SODIUM AND TAZOBACTAM SODIUM 4.5 G: 4; .5 INJECTION, POWDER, FOR SOLUTION INTRAVENOUS at 01:07

## 2025-07-25 RX ADMIN — TACROLIMUS 1 MG: 1 CAPSULE ORAL at 08:07

## 2025-07-25 RX ADMIN — MUPIROCIN: 20 OINTMENT TOPICAL at 08:07

## 2025-07-25 RX ADMIN — PREDNISONE 5 MG: 2.5 TABLET ORAL at 08:07

## 2025-07-25 RX ADMIN — ASPIRIN 81 MG CHEWABLE TABLET 81 MG: 81 TABLET CHEWABLE at 08:07

## 2025-07-25 RX ADMIN — PIPERACILLIN SODIUM AND TAZOBACTAM SODIUM 4.5 G: 4; .5 INJECTION, POWDER, FOR SOLUTION INTRAVENOUS at 08:07

## 2025-07-25 NOTE — PLAN OF CARE
Afebrile  Blood cx NGTD  Ok for discharge home on augmentin 875 BID, please give 14d supply to last until scheduled dental extraction    Will sign off    Allison Loomis DO  Transplant Infectious Disease

## 2025-07-25 NOTE — CONSULTS
[facility] - Cardiology Stepdown  Heart Transplant  Consult Note    Patient Name: Wilmer Aponte  MRN: [id number]  Admission Date: 7/23/2025  Hospital Length of Stay: 2 days  Attending Physician: Dante King MD  Primary Care Provider: Ugo Pratt MD   Principal Problem:Sepsis    Consults  Subjective:     History of Present Illness:  24-year-old with history of heart transplant on 09/29/2014 secondary to dilated cardiomyopathy, donor CMV positive, recipient CMV negative, history of rejection in 2014 treated with pulse steroids and 20 time-out site globulin, had a left heart catheterization and biopsy negative for rejection in 2019, had a 2nd event of ACR/AMR in 2021 treated with steroids/plexus/IVIG/tiroximab and a drop in EF was noted to 25%, compliant with his immunosuppressants, on tacrolimus 1 b.i.d. goal 5-10, since it 1500 mg b.i.d., prednisone 5 mg, hypertension, hyperlipidemia presented to the hospital with fevers, was ruled in for sepsis, treated with vancomycin and Zosyn, work up showed possible source being the right lower tooth, UA negative, COVID was negative, CT chest abdomen pelvis was negative for any acute pathology, ID was involved, patient vancomycin stopped, continued on his own seen with plan for 14 days of Augmentin and ultimately to the extraction against 07/2025, SDS involved given history of transplant and recommendation regarding immunosuppression,    In-house patient has been continued on his home tacrolimus and prednisone, tacrolimus level recently was 8.7, CellCept on hold, patient admits of being compliant with his medications apart from this hospitalization no recent events. he feels symptomatically better, hemodynamics have improved.    Past Medical History:   Diagnosis Date    Anxiety     Dilated cardiomyopathy     13 y/o    Encounter for blood transfusion     Gastrocutaneous fistula due to gastrostomy tube     Heart transplanted     9/29/14 at [place].  Donor CMV+/R-.   [place] BiVAD 9/23-29.    Hypertension     Oral aversion     Status post orthotopic heart transplant        Past Surgical History:   Procedure Laterality Date    ANGIOGRAM, CORONARY, WITH LEFT HEART CATHETERIZATION N/A 3/25/2025    Procedure: Angiogram, Coronary, with Left Heart Cath;  Surgeon: Pranav Valles MD;  Location: [facility] CATH LAB;  Service: Cardiology;  Laterality: N/A;    BIOPSY WITH ULTRASOUND GUIDANCE Right 10/26/2021    Procedure: BIOPSY, WITH US GUIDANCE;  Surgeon: Kirsty Ward MD;  Location: [facility] CATH LAB;  Service: Cardiology;  Laterality: Right;    COMBINED RIGHT AND RETROGRADE LEFT HEART CATHETERIZATION FOR CONGENITAL HEART DEFECT N/A 4/22/2021    Procedure: CATHETERIZATION, HEART, COMBINED RIGHT AND RETROGRADE LEFT, FOR CONGENITAL HEART DEFECT;  Surgeon: Jose Burns Jr., MD;  Location: [facility] CATH LAB;  Service: Cardiology;  Laterality: N/A;  Pedi Heart    ESOPHAGOGASTRODUODENOSCOPY N/A 7/20/2021    Procedure: EGD (ESOPHAGOGASTRODUODENOSCOPY);  Surgeon: Nacho Martínez MD;  Location: [facility] ENDO ([address] [place] [address]);  Service: Endoscopy;  Laterality: N/A;    EXTRACORPOREAL CIRCULATION      GASTROSTOMY TUBE PLACEMENT      HEART TRANSPLANT      LEFT VENTRICULAR ASSIST DEVICE      RIGHT HEART CATHETERIZATION Right 6/18/2021    Procedure: INSERTION, CATHETER, RIGHT HEART;  Surgeon: Mg Monroy MD;  Location: [facility] CATH LAB;  Service: Cardiology;  Laterality: Right;       Review of patient's allergies indicates:   Allergen Reactions    Clindamycin Anaphylaxis       Current Facility-Administered Medications   Medication    acetaminophen tablet 650 mg    albuterol-ipratropium 2.5 mg-0.5 mg/3 mL nebulizer solution 3 mL    aluminum-magnesium hydroxide-simethicone 200-200-20 mg/5 mL suspension 30 mL    aspirin chewable tablet 81 mg    bisacodyL suppository 10 mg    dextrose 50% injection 12.5 g    dextrose 50% injection 25 g    glucagon (human recombinant) injection 1 mg    glucose chewable tablet 16 g     glucose chewable tablet 24 g    hydrOXYzine HCL tablet 25 mg    melatonin tablet 6 mg    mupirocin 2 % ointment    naloxone 0.4 mg/mL injection 0.02 mg    ondansetron injection 4 mg    piperacillin-tazobactam (ZOSYN) 4.5 g in D5W 100 mL IVPB (MB+)    polyethylene glycol packet 17 g    pravastatin tablet 40 mg    predniSONE tablet 5 mg    prochlorperazine injection Soln 5 mg    simethicone chewable tablet 80 mg    sodium chloride 0.9% flush 10 mL    tacrolimus capsule 1 mg     Family History       Problem Relation (Age of Onset)    Diabetes type II Mother    Dilated cardiomyopathy Father, Paternal Uncle          Tobacco Use    Smoking status: Never    Smokeless tobacco: Never   Substance and Sexual Activity    Alcohol use: No    Drug use: No    Sexual activity: Never     Review of Systems   Constitutional:  Negative for activity change, appetite change, chills, diaphoresis, fatigue and fever.        Had fevers on admission now resolved   HENT:  Positive for dental problem. Negative for congestion, drooling and ear discharge.    Eyes:  Negative for pain, discharge and itching.   Respiratory:  Negative for apnea, choking and chest tightness.    Cardiovascular:  Negative for chest pain and leg swelling.   Gastrointestinal:  Negative for abdominal distention and abdominal pain.   Endocrine: Negative for cold intolerance, heat intolerance and polydipsia.   Genitourinary:  Negative for difficulty urinating, dysuria and enuresis.   Musculoskeletal:  Negative for arthralgias, back pain and gait problem.   Skin:  Negative for color change and pallor.   Neurological:  Negative for dizziness, facial asymmetry and headaches.   Psychiatric/Behavioral:  Negative for agitation, behavioral problems and confusion.      Objective:     Vital Signs (Most Recent):  Temp: 98.1 °F (36.7 °C) (07/25/25 0733)  Pulse: 103 (07/25/25 1047)  Resp: 18 (07/25/25 0733)  BP: 115/74 (07/25/25 0733)  SpO2: 98 % (07/25/25 0733) Vital Signs (24h  "Range):  Temp:  [97.8 °F (36.6 °C)-98.5 °F (36.9 °C)] 98.1 °F (36.7 °C)  Pulse:  [] 103  Resp:  [18] 18  SpO2:  [96 %-98 %] 98 %  BP: (101-123)/(60-87) 115/74     Patient Vitals for the past 72 hrs (Last 3 readings):   Weight   07/23/25 1112 68 kg (150 lb)   07/23/25 0815 68.2 kg (150 lb 5.7 oz)     Body mass index is 21.52 kg/m².      Intake/Output Summary (Last 24 hours) at 7/25/2025 1113  Last data filed at 7/24/2025 2332  Gross per 24 hour   Intake 118 ml   Output 850 ml   Net -732 ml          Physical Exam       Significant Labs:  CBC:  Recent Labs   Lab 07/23/25  1027 07/24/25  0324 07/25/25  0603   WBC 5.47 9.82 6.27   RBC 5.80 5.81 5.85   HGB 16.2 16.1 16.2   HCT 47.2 46.9 51.3    184 178   MCV 81* 81* 88   MCH 27.9 27.7 27.7   MCHC 34.3 34.3 31.6*     BNP:  Recent Labs   Lab 07/22/25  2319   BNP 87*     CMP:  Recent Labs   Lab 07/23/25  1027 07/24/25  0324 07/25/25  0603   * 93 84   CALCIUM 8.3* 8.2* 8.1*   ALBUMIN 3.9 3.8 3.3*   PROT 6.5 6.7 6.4   * 135* 136   K 3.2* 3.1* 4.1   CO2 23 23 18*    102 109   BUN 5* 5* 5*   CREATININE 0.8 0.9 0.8   ALKPHOS 116 108 86   ALT 18 20 18   AST 13 17 25   BILITOT 1.0 1.0 0.6      Coagulation:   Recent Labs   Lab 07/22/25  2319   INR 1.2     LDH:  No results for input(s): "LDH" in the last 72 hours.  Microbiology:  Microbiology Results (last 7 days)       Procedure Component Value Units Date/Time    Blood culture [[id number]]  (Normal) Collected: 07/23/25 1028    Order Status: Completed Specimen: Blood from Peripheral, Forearm, Right Updated: 07/25/25 0104     Blood Culture No Growth After 36 Hours    Blood culture [[id number]]  (Normal) Collected: 07/23/25 1031    Order Status: Completed Specimen: Blood from Peripheral, Lower Arm, Right Updated: 07/25/25 0104     Blood Culture No Growth After 36 Hours    Blood culture [[id number]]  (Normal) Collected: 07/23/25 1028    Order Status: Completed Specimen: Blood from Peripheral, Forearm, " Right Updated: 07/25/25 0104     Blood Culture No Growth After 36 Hours    Blood culture [[id number]]  (Normal) Collected: 07/23/25 1031    Order Status: Completed Specimen: Blood from Peripheral, Lower Arm, Right Updated: 07/25/25 0104     Blood Culture No Growth After 36 Hours            I have reviewed all pertinent labs within the past 24 hours.    Diagnostic Results:  reviewed    Assessment/Plan:     No notes have been filed under this hospital service.  Service: Transplant Heart      Thank you for your consult. {SIGN OFF/FOLLOW-UP:70866}    Dennis Berry MD  Heart Transplant  [facility] - Cardiology Stepdown

## 2025-07-25 NOTE — ASSESSMENT & PLAN NOTE
S/p heart transplant in 2014  Compliant with his immunosuppressants, take tacrolimus 1 b.i.d., CellCept 1500 mg b.i.d., prednisone 5 mg.  Tacrolimus level reviewed, 8.7  -we will recommend discharging home on home dose of tacrolimus 1 b.i.d., prednisone 5 mg and reduce CellCept to 1000 mg b.i.d. ( 5ml BID suspension) .  Once he finishes the course of antibiotics he can go back on his home dose of 1500 mg b.i.d.( 7.5 ml BID ).  -we will update his transplant coordinator, follow-ups as before.

## 2025-07-25 NOTE — PLAN OF CARE
07/25/25 1153   Rounds   Attendance Provider;;Assigned nurse;Charge nurse  (unit-based AB)   Discharge Plan A Home with family   Why the patient remains in the hospital Requires continued medical care   Transition of Care Barriers Underinsured     Possible discharge later today pending clearance from [facility] and ID.      Jamila Cai, SYLVIA  [facility]  [phone]

## 2025-07-25 NOTE — SUBJECTIVE & OBJECTIVE
Past Medical History:   Diagnosis Date    Anxiety     Dilated cardiomyopathy     13 y/o    Encounter for blood transfusion     Gastrocutaneous fistula due to gastrostomy tube     Heart transplanted     9/29/14 at [place].  Donor CMV+/R-.  [place] BiVAD 9/23-29.    Hypertension     Oral aversion     Status post orthotopic heart transplant        Past Surgical History:   Procedure Laterality Date    ANGIOGRAM, CORONARY, WITH LEFT HEART CATHETERIZATION N/A 3/25/2025    Procedure: Angiogram, Coronary, with Left Heart Cath;  Surgeon: Pranav Valles MD;  Location: [facility] CATH LAB;  Service: Cardiology;  Laterality: N/A;    BIOPSY WITH ULTRASOUND GUIDANCE Right 10/26/2021    Procedure: BIOPSY, WITH US GUIDANCE;  Surgeon: Kirsty Ward MD;  Location: [facility] CATH LAB;  Service: Cardiology;  Laterality: Right;    COMBINED RIGHT AND RETROGRADE LEFT HEART CATHETERIZATION FOR CONGENITAL HEART DEFECT N/A 4/22/2021    Procedure: CATHETERIZATION, HEART, COMBINED RIGHT AND RETROGRADE LEFT, FOR CONGENITAL HEART DEFECT;  Surgeon: Jose Burns Jr., MD;  Location: [facility] CATH LAB;  Service: Cardiology;  Laterality: N/A;  Pedi Heart    ESOPHAGOGASTRODUODENOSCOPY N/A 7/20/2021    Procedure: EGD (ESOPHAGOGASTRODUODENOSCOPY);  Surgeon: Nacho Martínez MD;  Location: [facility] ENDO ([address] [place] [address]);  Service: Endoscopy;  Laterality: N/A;    EXTRACORPOREAL CIRCULATION      GASTROSTOMY TUBE PLACEMENT      HEART TRANSPLANT      LEFT VENTRICULAR ASSIST DEVICE      RIGHT HEART CATHETERIZATION Right 6/18/2021    Procedure: INSERTION, CATHETER, RIGHT HEART;  Surgeon: Mg Monroy MD;  Location: [facility] CATH LAB;  Service: Cardiology;  Laterality: Right;       Review of patient's allergies indicates:   Allergen Reactions    Clindamycin Anaphylaxis       Current Facility-Administered Medications   Medication    acetaminophen tablet 650 mg    albuterol-ipratropium 2.5 mg-0.5 mg/3 mL nebulizer solution 3 mL    aluminum-magnesium hydroxide-simethicone  200-200-20 mg/5 mL suspension 30 mL    aspirin chewable tablet 81 mg    bisacodyL suppository 10 mg    dextrose 50% injection 12.5 g    dextrose 50% injection 25 g    glucagon (human recombinant) injection 1 mg    glucose chewable tablet 16 g    glucose chewable tablet 24 g    hydrOXYzine HCL tablet 25 mg    melatonin tablet 6 mg    mupirocin 2 % ointment    naloxone 0.4 mg/mL injection 0.02 mg    ondansetron injection 4 mg    piperacillin-tazobactam (ZOSYN) 4.5 g in D5W 100 mL IVPB (MB+)    polyethylene glycol packet 17 g    pravastatin tablet 40 mg    predniSONE tablet 5 mg    prochlorperazine injection Soln 5 mg    simethicone chewable tablet 80 mg    sodium chloride 0.9% flush 10 mL    tacrolimus capsule 1 mg     Family History       Problem Relation (Age of Onset)    Diabetes type II Mother    Dilated cardiomyopathy Father, Paternal Uncle          Tobacco Use    Smoking status: Never    Smokeless tobacco: Never   Substance and Sexual Activity    Alcohol use: No    Drug use: No    Sexual activity: Never     Review of Systems   Constitutional:  Negative for activity change, appetite change, chills, diaphoresis, fatigue and fever.        Had fevers on admission now resolved   HENT:  Positive for dental problem. Negative for congestion, drooling and ear discharge.    Eyes:  Negative for pain, discharge and itching.   Respiratory:  Negative for apnea, choking and chest tightness.    Cardiovascular:  Negative for chest pain and leg swelling.   Gastrointestinal:  Negative for abdominal distention and abdominal pain.   Endocrine: Negative for cold intolerance, heat intolerance and polydipsia.   Genitourinary:  Negative for difficulty urinating, dysuria and enuresis.   Musculoskeletal:  Negative for arthralgias, back pain and gait problem.   Skin:  Negative for color change and pallor.   Neurological:  Negative for dizziness, facial asymmetry and headaches.   Psychiatric/Behavioral:  Negative for agitation, behavioral  problems and confusion.      Objective:     Vital Signs (Most Recent):  Temp: 98.1 °F (36.7 °C) (07/25/25 0733)  Pulse: 103 (07/25/25 1047)  Resp: 18 (07/25/25 0733)  BP: 115/74 (07/25/25 0733)  SpO2: 98 % (07/25/25 0733) Vital Signs (24h Range):  Temp:  [97.8 °F (36.6 °C)-98.5 °F (36.9 °C)] 98.1 °F (36.7 °C)  Pulse:  [] 103  Resp:  [18] 18  SpO2:  [96 %-98 %] 98 %  BP: (101-123)/(60-87) 115/74     Patient Vitals for the past 72 hrs (Last 3 readings):   Weight   07/23/25 1112 68 kg (150 lb)   07/23/25 0815 68.2 kg (150 lb 5.7 oz)     Body mass index is 21.52 kg/m².      Intake/Output Summary (Last 24 hours) at 7/25/2025 1113  Last data filed at 7/24/2025 2332  Gross per 24 hour   Intake 118 ml   Output 850 ml   Net -732 ml          Physical Exam  Constitutional:       Appearance: Normal appearance.   HENT:      Head:      Comments: Mild R sided facial swelling      Right Ear: Tympanic membrane normal.      Left Ear: Tympanic membrane normal.   Eyes:      General:         Right eye: No discharge.         Left eye: No discharge.      Pupils: Pupils are equal, round, and reactive to light.   Cardiovascular:      Rate and Rhythm: Normal rate and regular rhythm.      Pulses: Normal pulses.      Heart sounds: No murmur heard.  Pulmonary:      Effort: Pulmonary effort is normal. No respiratory distress.      Breath sounds: No stridor.   Abdominal:      General: Abdomen is flat. Bowel sounds are normal. There is no distension.      Tenderness: There is no abdominal tenderness.   Musculoskeletal:         General: No swelling or deformity. Normal range of motion.   Skin:     General: Skin is warm.      Capillary Refill: Capillary refill takes less than 2 seconds.      Coloration: Skin is not jaundiced.      Findings: No bruising.   Neurological:      General: No focal deficit present.      Mental Status: He is alert.            Significant Labs:  CBC:  Recent Labs   Lab 07/23/25  1027 07/24/25  0324 07/25/25  0603   WBC  "5.47 9.82 6.27   RBC 5.80 5.81 5.85   HGB 16.2 16.1 16.2   HCT 47.2 46.9 51.3    184 178   MCV 81* 81* 88   MCH 27.9 27.7 27.7   MCHC 34.3 34.3 31.6*     BNP:  Recent Labs   Lab 07/22/25  2319   BNP 87*     CMP:  Recent Labs   Lab 07/23/25  1027 07/24/25  0324 07/25/25  0603   * 93 84   CALCIUM 8.3* 8.2* 8.1*   ALBUMIN 3.9 3.8 3.3*   PROT 6.5 6.7 6.4   * 135* 136   K 3.2* 3.1* 4.1   CO2 23 23 18*    102 109   BUN 5* 5* 5*   CREATININE 0.8 0.9 0.8   ALKPHOS 116 108 86   ALT 18 20 18   AST 13 17 25   BILITOT 1.0 1.0 0.6      Coagulation:   Recent Labs   Lab 07/22/25  2319   INR 1.2     LDH:  No results for input(s): "LDH" in the last 72 hours.  Microbiology:  Microbiology Results (last 7 days)       Procedure Component Value Units Date/Time    Blood culture [[id number]]  (Normal) Collected: 07/23/25 1028    Order Status: Completed Specimen: Blood from Peripheral, Forearm, Right Updated: 07/25/25 0104     Blood Culture No Growth After 36 Hours    Blood culture [[id number]]  (Normal) Collected: 07/23/25 1031    Order Status: Completed Specimen: Blood from Peripheral, Lower Arm, Right Updated: 07/25/25 0104     Blood Culture No Growth After 36 Hours    Blood culture [[id number]]  (Normal) Collected: 07/23/25 1028    Order Status: Completed Specimen: Blood from Peripheral, Forearm, Right Updated: 07/25/25 0104     Blood Culture No Growth After 36 Hours    Blood culture [[id number]]  (Normal) Collected: 07/23/25 1031    Order Status: Completed Specimen: Blood from Peripheral, Lower Arm, Right Updated: 07/25/25 0104     Blood Culture No Growth After 36 Hours            I have reviewed all pertinent labs within the past 24 hours.    Diagnostic Results:  reviewed    "

## 2025-07-25 NOTE — NURSING
Patient is AAOX4, VSS, NAD. Discharge instructions reviewed and explained. Patient and mother verbalized understanding. Tele monitor and PIVs removed. Patient is to be escorted via wheel chair to vehicle by a transporter.

## 2025-07-25 NOTE — PLAN OF CARE
[facility] Cardiology Stepdown  Discharge Final Note    Primary Care Provider: Ugo Pratt MD    Expected Discharge Date: 7/25/2025    Final Discharge Note (most recent)       Final Note - 07/25/25 1557          Final Note    Assessment Type Final Discharge Note     Anticipated Discharge Disposition Home or Self Care                 HTS to schedule their own appt.    Jamila Cai LMSW  [facility]  [phone]

## 2025-07-25 NOTE — CONSULTS
[facility] - Cardiology Stepdown  Heart Transplant  Consult Note    Patient Name: Wilmer Aponte  MRN: [id number]  Admission Date: 7/23/2025  Hospital Length of Stay: 2 days  Attending Physician: Dante King MD  Primary Care Provider: Ugo Pratt MD   Principal Problem:Sepsis    Consults  Subjective:     History of Present Illness:  24-year-old with history of heart transplant on 09/29/2014 secondary to dilated cardiomyopathy, donor CMV positive, recipient CMV negative, history of rejection in 2014 treated with pulse steroids and 20 time-out site globulin, had a left heart catheterization and biopsy negative for rejection in 2019, had a 2nd event of ACR/AMR in 2021 treated with steroids/plexus/IVIG/tiroximab and a drop in EF was noted to 25%, compliant with his immunosuppressants, on tacrolimus 1 b.i.d. goal 5-10, since it 1500 mg b.i.d., prednisone 5 mg, hypertension, hyperlipidemia presented to the hospital with fevers, was ruled in for sepsis, treated with vancomycin and Zosyn, work up showed possible source being the right lower tooth, UA negative, COVID was negative, CT chest abdomen pelvis was negative for any acute pathology, ID was involved, patient vancomycin stopped, continued on his own seen with plan for 14 days of Augmentin and ultimately to the extraction against 07/2025, SDS involved given history of transplant and recommendation regarding immunosuppression,    In-house patient has been continued on his home tacrolimus and prednisone, tacrolimus level recently was 8.7, CellCept on hold, patient admits of being compliant with his medications apart from this hospitalization no recent events. he feels symptomatically better, hemodynamics have improved.    Past Medical History:   Diagnosis Date    Anxiety     Dilated cardiomyopathy     13 y/o    Encounter for blood transfusion     Gastrocutaneous fistula due to gastrostomy tube     Heart transplanted     9/29/14 at [place].  Donor CMV+/R-.   [place] [facility] 9/23-29.    Hypertension     Oral aversion     Status post orthotopic heart transplant        Past Surgical History:   Procedure Laterality Date    ANGIOGRAM, CORONARY, WITH LEFT HEART CATHETERIZATION N/A 3/25/2025    Procedure: Angiogram, Coronary, with Left Heart Cath;  Surgeon: Pranav Valles MD;  Location: [facility] CATH LAB;  Service: Cardiology;  Laterality: N/A;    BIOPSY WITH ULTRASOUND GUIDANCE Right 10/26/2021    Procedure: BIOPSY, WITH US GUIDANCE;  Surgeon: Kirsty Ward MD;  Location: [facility] CATH LAB;  Service: Cardiology;  Laterality: Right;    COMBINED RIGHT AND RETROGRADE LEFT HEART CATHETERIZATION FOR CONGENITAL HEART DEFECT N/A 4/22/2021    Procedure: CATHETERIZATION, HEART, COMBINED RIGHT AND RETROGRADE LEFT, FOR CONGENITAL HEART DEFECT;  Surgeon: Jose Burns Jr., MD;  Location: [facility] CATH LAB;  Service: Cardiology;  Laterality: N/A;  Pedi Heart    ESOPHAGOGASTRODUODENOSCOPY N/A 7/20/2021    Procedure: EGD (ESOPHAGOGASTRODUODENOSCOPY);  Surgeon: Nacho Martínez MD;  Location: [facility] ENDO ([address] [place] [address]);  Service: Endoscopy;  Laterality: N/A;    EXTRACORPOREAL CIRCULATION      GASTROSTOMY TUBE PLACEMENT      HEART TRANSPLANT      LEFT VENTRICULAR ASSIST DEVICE      RIGHT HEART CATHETERIZATION Right 6/18/2021    Procedure: INSERTION, CATHETER, RIGHT HEART;  Surgeon: Mg Monroy MD;  Location: [facility] CATH LAB;  Service: Cardiology;  Laterality: Right;       Review of patient's allergies indicates:   Allergen Reactions    Clindamycin Anaphylaxis       Current Facility-Administered Medications   Medication    acetaminophen tablet 650 mg    albuterol-ipratropium 2.5 mg-0.5 mg/3 mL nebulizer solution 3 mL    aluminum-magnesium hydroxide-simethicone 200-200-20 mg/5 mL suspension 30 mL    aspirin chewable tablet 81 mg    bisacodyL suppository 10 mg    dextrose 50% injection 12.5 g    dextrose 50% injection 25 g    glucagon (human recombinant) injection 1 mg    glucose chewable tablet 16 g     glucose chewable tablet 24 g    hydrOXYzine HCL tablet 25 mg    melatonin tablet 6 mg    mupirocin 2 % ointment    naloxone 0.4 mg/mL injection 0.02 mg    ondansetron injection 4 mg    piperacillin-tazobactam (ZOSYN) 4.5 g in D5W 100 mL IVPB (MB+)    polyethylene glycol packet 17 g    pravastatin tablet 40 mg    predniSONE tablet 5 mg    prochlorperazine injection Soln 5 mg    simethicone chewable tablet 80 mg    sodium chloride 0.9% flush 10 mL    tacrolimus capsule 1 mg     Family History       Problem Relation (Age of Onset)    Diabetes type II Mother    Dilated cardiomyopathy Father, Paternal Uncle          Tobacco Use    Smoking status: Never    Smokeless tobacco: Never   Substance and Sexual Activity    Alcohol use: No    Drug use: No    Sexual activity: Never     Review of Systems   Constitutional:  Negative for activity change, appetite change, chills, diaphoresis, fatigue and fever.        Had fevers on admission now resolved   HENT:  Positive for dental problem. Negative for congestion, drooling and ear discharge.    Eyes:  Negative for pain, discharge and itching.   Respiratory:  Negative for apnea, choking and chest tightness.    Cardiovascular:  Negative for chest pain and leg swelling.   Gastrointestinal:  Negative for abdominal distention and abdominal pain.   Endocrine: Negative for cold intolerance, heat intolerance and polydipsia.   Genitourinary:  Negative for difficulty urinating, dysuria and enuresis.   Musculoskeletal:  Negative for arthralgias, back pain and gait problem.   Skin:  Negative for color change and pallor.   Neurological:  Negative for dizziness, facial asymmetry and headaches.   Psychiatric/Behavioral:  Negative for agitation, behavioral problems and confusion.      Objective:     Vital Signs (Most Recent):  Temp: 98.1 °F (36.7 °C) (07/25/25 0733)  Pulse: 103 (07/25/25 1047)  Resp: 18 (07/25/25 0733)  BP: 115/74 (07/25/25 0733)  SpO2: 98 % (07/25/25 0733) Vital Signs (24h  Range):  Temp:  [97.8 °F (36.6 °C)-98.5 °F (36.9 °C)] 98.1 °F (36.7 °C)  Pulse:  [] 103  Resp:  [18] 18  SpO2:  [96 %-98 %] 98 %  BP: (101-123)/(60-87) 115/74     Patient Vitals for the past 72 hrs (Last 3 readings):   Weight   07/23/25 1112 68 kg (150 lb)   07/23/25 0815 68.2 kg (150 lb 5.7 oz)     Body mass index is 21.52 kg/m².      Intake/Output Summary (Last 24 hours) at 7/25/2025 1113  Last data filed at 7/24/2025 2332  Gross per 24 hour   Intake 118 ml   Output 850 ml   Net -732 ml          Physical Exam  Constitutional:       Appearance: Normal appearance.   HENT:      Head:      Comments: Mild R sided facial swelling      Right Ear: Tympanic membrane normal.      Left Ear: Tympanic membrane normal.   Eyes:      General:         Right eye: No discharge.         Left eye: No discharge.      Pupils: Pupils are equal, round, and reactive to light.   Cardiovascular:      Rate and Rhythm: Normal rate and regular rhythm.      Pulses: Normal pulses.      Heart sounds: No murmur heard.  Pulmonary:      Effort: Pulmonary effort is normal. No respiratory distress.      Breath sounds: No stridor.   Abdominal:      General: Abdomen is flat. Bowel sounds are normal. There is no distension.      Tenderness: There is no abdominal tenderness.   Musculoskeletal:         General: No swelling or deformity. Normal range of motion.   Skin:     General: Skin is warm.      Capillary Refill: Capillary refill takes less than 2 seconds.      Coloration: Skin is not jaundiced.      Findings: No bruising.   Neurological:      General: No focal deficit present.      Mental Status: He is alert.            Significant Labs:  CBC:  Recent Labs   Lab 07/23/25  1027 07/24/25  0324 07/25/25  0603   WBC 5.47 9.82 6.27   RBC 5.80 5.81 5.85   HGB 16.2 16.1 16.2   HCT 47.2 46.9 51.3    184 178   MCV 81* 81* 88   MCH 27.9 27.7 27.7   MCHC 34.3 34.3 31.6*     BNP:  Recent Labs   Lab 07/22/25  2319   BNP 87*     CMP:  Recent Labs   Lab  "07/23/25  1027 07/24/25  0324 07/25/25  0603   * 93 84   CALCIUM 8.3* 8.2* 8.1*   ALBUMIN 3.9 3.8 3.3*   PROT 6.5 6.7 6.4   * 135* 136   K 3.2* 3.1* 4.1   CO2 23 23 18*    102 109   BUN 5* 5* 5*   CREATININE 0.8 0.9 0.8   ALKPHOS 116 108 86   ALT 18 20 18   AST 13 17 25   BILITOT 1.0 1.0 0.6      Coagulation:   Recent Labs   Lab 07/22/25  2319   INR 1.2     LDH:  No results for input(s): "LDH" in the last 72 hours.  Microbiology:  Microbiology Results (last 7 days)       Procedure Component Value Units Date/Time    Blood culture [[id number]]  (Normal) Collected: 07/23/25 1028    Order Status: Completed Specimen: Blood from Peripheral, Forearm, Right Updated: 07/25/25 0104     Blood Culture No Growth After 36 Hours    Blood culture [[id number]]  (Normal) Collected: 07/23/25 1031    Order Status: Completed Specimen: Blood from Peripheral, Lower Arm, Right Updated: 07/25/25 0104     Blood Culture No Growth After 36 Hours    Blood culture [[id number]]  (Normal) Collected: 07/23/25 1028    Order Status: Completed Specimen: Blood from Peripheral, Forearm, Right Updated: 07/25/25 0104     Blood Culture No Growth After 36 Hours    Blood culture [[id number]]  (Normal) Collected: 07/23/25 1031    Order Status: Completed Specimen: Blood from Peripheral, Lower Arm, Right Updated: 07/25/25 0104     Blood Culture No Growth After 36 Hours            I have reviewed all pertinent labs within the past 24 hours.    Diagnostic Results:  reviewed    Assessment/Plan:     Hx of heart transplant 09/29/2014  Immunosuppression due to drug therapy  S/p heart transplant in 2014  Compliant with his immunosuppressants, discussed with mom at bedside  -takes tacrolimus 1 b.i.d., CellCept 1500 mg b.i.d., prednisone 5 mg.  Tacrolimus level reviewed, 8.7  -we will recommend discharging home on home dose of tacrolimus 1 b.i.d., prednisone 5 mg and reduce CellCept to 1000 mg b.i.d. ( 5ml BID suspension) .  Once he finishes the " course of antibiotics he can go back on his home dose of 1500 mg b.i.d.( 7.5 ml BID ).  -we will update his transplant coordinator, follow-ups as before.        Thank you for your consult. I will sign off. Please contact us if you have any additional questions.    Dennis Berry MD  Heart Transplant  [facility] - Cardiology Stepdown

## 2025-07-25 NOTE — HPI
24-year-old with history of heart transplant on 09/29/2014 secondary to dilated cardiomyopathy, donor CMV positive, recipient CMV negative, history of rejection in 2014 treated with pulse steroids and 20 time-out site globulin, had a left heart catheterization and biopsy negative for rejection in 2019, had a 2nd event of ACR/AMR in 2021 treated with steroids/plexus/IVIG/tiroximab and a drop in EF was noted to 25%, compliant with his immunosuppressants, on tacrolimus 1 b.i.d. goal 5-10, since it 1500 mg b.i.d., prednisone 5 mg, hypertension, hyperlipidemia presented to the hospital with fevers, was ruled in for sepsis, treated with vancomycin and Zosyn, work up showed possible source being the right lower tooth, UA negative, COVID was negative, CT chest abdomen pelvis was negative for any acute pathology, ID was involved, patient vancomycin stopped, continued on his own seen with plan for 14 days of Augmentin and ultimately to the extraction against 07/2025, SDS involved given history of transplant and recommendation regarding immunosuppression,    In-house patient has been continued on his home tacrolimus and prednisone, tacrolimus level recently was 8.7, CellCept on hold, patient admits of being compliant with his medications apart from this hospitalization no recent events. he feels symptomatically better, hemodynamics have improved.

## 2025-07-26 LAB
BACTERIA BLD CULT: NORMAL

## 2025-07-28 LAB
BACTERIA BLD CULT: NORMAL

## 2025-07-28 NOTE — DISCHARGE SUMMARY
"[facility] - Cardiology [facility] Medicine  Discharge Summary      Patient Name: Wilmer Aponte  MRN: [id number]  BORIS: [id number]  Patient Class: IP- Inpatient  Admission Date: 7/23/2025  Hospital Length of Stay: 2 days  Discharge Date and Time: 7/25/2025  6:25 PM  Attending Physician: No att. providers found   Discharging Provider: Dante King MD  Primary Care Provider: Ugo Pratt MD  [facility] Medicine Team: [facility] HOSP MED C Dante King MD  Primary Care Team: [facility]    HPI:   Per : "The patient is a 23 y/o male with PMH heart transplant in Sept 2014 on prednisone, prograf, and cellcept, HTN, and HLP. He presented to the referring facility with fevers to 104, body aches, and chills. In the ER, Tmax was 102.9 and tachy in the 140s. WBC 8, LA 2.6, flu, covid, strep negative, UA negative, CXR unrevealing. Patient has poor dentition. Patient was given vanc and zosyn. The case was discussed with Dr. Gore of [facility] and would like the patient transferred for evaluation. Suspicion for bacteremia with possible oral source ? Given 1L of IVF and HRs improved to the 110s at the time of this transfer call. Rest of vitals stable."    Patient reports left lower tooth pain for the past 4-5 days with the above associated fevers, body aches. Denies any sick contacts, diaphoresis, lightheadedness, HA, CP, cough, congestion, abdominal pain, n/v/d, urinary symptoms, changes in BMs, numbness/tingling, weakness.       * No surgery found *      Hospital Course:   Mr. Aponte is a 24-year-old gentleman with history of heart transplant in 2014 (on prednisone, tacrolimus, mycophenolate), hypertension.  He was admitted with sepsis.  Patient had fever, tachycardia to the 130s, tachypnea.  No leukocytosis.  He had lactic acidosis at time of admission.  The source is thought to be a dental infection involving one of the teeth in his left mandible.  CT neck negative for abscess. Did incidentally find a small " right thyroid nodule. Chest x-ray negative for consolidation.  Urinalysis negative for infectious markers.  An echocardiogram was not able to find any evidence endocarditis.  Troponin I not elevated.  Blood cultures were negative x4.   Patient given doses of vancomycin and then started on Zosyn.  Fever resolved and vital signs normalized.  Heart transplant service and transplant infectious disease team were consulted.  ID recommended switching to Augmentin for planned 14 day course until patient can get tooth extracted.  Patient stable discharge with PCP and cardiology follow-up.  Will likely biopsy of thyroid nodule.     Goals of Care Treatment Preferences:  Code Status: Full Code         Consults:   Consults (From admission, onward)          Status Ordering Provider     Inpatient consult to Infectious Diseases  Once        Provider:  (Not yet assigned)    Completed SIMEON LINDSAY            Assessment & Plan  Dental infection  Patient has sepsis without organ dysfunction secondary to possible oral. A review of systems was completed. Patient's sepsis is stable    Current Antibiotics    , Every 8 hours, Oral    Lactate  Recent Labs   Lab 07/22/25  2349 07/23/25  0102   LACTATE 2.6* 1     Culture Data  Blood Cultures   Blood Culture   Date Value Ref Range Status   07/23/2025 No Growth After 5 Days  Final   07/23/2025 No Growth After 5 Days  Final   07/23/2025 No Growth After 5 Days  Final   07/23/2025 No Growth After 5 Days  Final      mSOFA  No data recorded    Plan  - Antibiotics as listed above  - Fluid resuscitation as follows:Fluid Not Needed - Patient is not hypotensive and/or lactate is less than 4.0.   - Trend lactate to resolution  - Follow up culture data  - Vasopressors were not needed  - The following services were consulted:  Heart transplant service and infectious diseases  - continue Zosyn with plan to transition to 2 week course of Augmentin if blood cultures remain negative            Heart  transplanted  Immunosuppression due to drug therapy  Chronic combined systolic and diastolic heart failure  - continue home prograf and prednisone  - holding cellcept in setting of infection  - BNP and Trop WNL  - Imaging at outside facility reviewed  - HTS consulted, appreciate recs  Essential hypertension  Patient's blood pressure range in the last 24 hours was: No data recorded.The patient's inpatient anti-hypertensive regimen is listed below:  Current Antihypertensives       Plan  - BP is controlled, no changes needed to their regimen  - Not on any antihypertensives  Other hyperlipidemia  - continue statin    Thyroid nodule greater than or equal to 1 cm in diameter incidentally noted on imaging study      Final Active Diagnoses:    Diagnosis Date Noted POA    Thyroid nodule greater than or equal to 1 cm in diameter incidentally noted on imaging study [E04.1] 07/25/2025 Yes    Dental infection [K04.7] 07/24/2025 Yes    Chronic combined systolic and diastolic heart failure [I50.42] 07/23/2025 No    Immunosuppression due to drug therapy [D84.821, Z79.899] 09/10/2022 Not Applicable    Other hyperlipidemia [E78.49] 03/08/2021 Yes    Heart transplanted [Z94.1] 12/06/2016 Not Applicable    Essential hypertension [I10] 12/06/2016 Yes      Problems Resolved During this Admission:    Diagnosis Date Noted Date Resolved POA    PRINCIPAL PROBLEM:  Sepsis [A41.9] 07/23/2025 07/25/2025 Yes    Fever [R50.9] 07/23/2025 07/25/2025 Yes    Noncompliance with medications [Z91.148]  07/25/2025 Not Applicable    Anxiety [F41.9] 12/06/2016 07/25/2025 Yes       Discharged Condition: good    Disposition: Home or Self Care    Follow Up:    Patient Instructions:      ACCEPT - Ambulatory referral/consult to Heart Failure Transitional Care Clinic   Standing Status: Future   Referral Priority: Routine Referral Type: Consultation   Referral Reason: Specialty Services Required   Requested Specialty: Cardiology   Number of Visits Requested: 1  "    Diet Cardiac       Significant Diagnostic Studies: Labs: CMP No results for input(s): "NA", "K", "CL", "CO2", "GLU", "BUN", "CREATININE", "CALCIUM", "PROT", "ALBUMIN", "BILITOT", "ALKPHOS", "AST", "ALT", "ANIONGAP", "ESTGFRAFRICA", "EGFRNONAA" in the last 48 hours., CBC No results for input(s): "WBC", "HGB", "HCT", "PLT" in the last 48 hours., INR   Lab Results   Component Value Date    INR 1.2 07/22/2025    INR 1.1 06/17/2021   , and Troponin   Recent Labs   Lab 07/22/25  2319   TROPONINI 0.02     Microbiology: Blood Culture   Lab Results   Component Value Date    LABBLOO No growth after 5 days. 07/20/2021    LABBLOO No growth after 5 days. 07/20/2021       Pending Diagnostic Studies:       None           Medications:  Reconciled Home Medications:      Medication List        START taking these medications      amoxicillin-pot clavulanate 250-62.5 mg/5ml 250-62.5 mg/5 mL suspension  Commonly known as: AUGMENTIN  Take 10 mLs (500 mg total) by mouth every 8 (eight) hours. for 14 days     hydrOXYzine HCL 25 MG tablet  Commonly known as: ATARAX  Take 1 tablet (25 mg total) by mouth 3 (three) times daily as needed for Anxiety.            CHANGE how you take these medications      mycophenolate mofetil 200 mg/mL Susr  Commonly known as: CELLCEPT  TAKE 5 mL by mouth Twice Daily until antibiotics are completed and tooth is extracted then take 7.5 mL by mouth twice daily.    (DISCARD BOTTLE 60 DAYS AFTER MIXED AND REFILL IF NECESSARY)  What changed: additional instructions            CONTINUE taking these medications      amoxicillin 400 mg/5 mL suspension  Commonly known as: AMOXIL  Take 25 mLs (2,000 mg total) by mouth As instructed (Take 2000 mg one hour prior to any dental procedure).     aspirin 81 MG Chew  Take 1 tablet (81 mg total) by mouth once daily.     cholecalciferol (vitamin D3) 25 mcg (1,000 unit) capsule  Commonly known as: VITAMIN D3  Take 1 capsule (1,000 Units total) by mouth once daily.     magnesium " oxide 400 mg (241.3 mg magnesium) tablet  Commonly known as: MAG-OX  Take 1 tablet (400 mg total) by mouth 2 (two) times daily.     pravastatin 40 MG tablet  Commonly known as: PRAVACHOL  Take 1 tablet (40 mg total) by mouth every evening.     predniSONE 5 MG tablet  Commonly known as: DELTASONE  Take 1 tablet (5 mg total) by mouth once daily.     sulfamethoxazole-trimethoprim 400-80mg 400-80 mg per tablet  Commonly known as: BACTRIM,SEPTRA  TAKE 1 TABLET BY MOUTH EVERY DAY     tacrolimus 1 MG Cap  Commonly known as: PROGRAF  Take 1 capsule (1 mg total) by mouth every 12 (twelve) hours. Take sublingual            STOP taking these medications      albuterol 90 mcg/actuation inhaler  Commonly known as: PROVENTIL/VENTOLIN HFA              Indwelling Lines/Drains at time of discharge:   Lines/Drains/Airways       None                       Time spent on the discharge of patient: 45 minutes         Dante King MD  Department of Hospital Medicine  [facility] - Cardiology Stepdown

## 2025-07-28 NOTE — ASSESSMENT & PLAN NOTE
Patient's blood pressure range in the last 24 hours was: No data recorded.The patient's inpatient anti-hypertensive regimen is listed below:  Current Antihypertensives       Plan  - BP is controlled, no changes needed to their regimen  - Not on any antihypertensives

## 2025-07-28 NOTE — ASSESSMENT & PLAN NOTE
Patient has sepsis without organ dysfunction secondary to possible oral. A review of systems was completed. Patient's sepsis is stable    Current Antibiotics    , Every 8 hours, Oral    Lactate  Recent Labs   Lab 07/22/25  2349 07/23/25  0102   LACTATE 2.6* 1     Culture Data  Blood Cultures   Blood Culture   Date Value Ref Range Status   07/23/2025 No Growth After 5 Days  Final   07/23/2025 No Growth After 5 Days  Final   07/23/2025 No Growth After 5 Days  Final   07/23/2025 No Growth After 5 Days  Final      mSOFA  No data recorded    Plan  - Antibiotics as listed above  - Fluid resuscitation as follows:Fluid Not Needed - Patient is not hypotensive and/or lactate is less than 4.0.   - Trend lactate to resolution  - Follow up culture data  - Vasopressors were not needed  - The following services were consulted:  Heart transplant service and infectious diseases  - continue Zosyn with plan to transition to 2 week course of Augmentin if blood cultures remain negative

## 2025-08-01 ENCOUNTER — EPISODE CHANGES (OUTPATIENT)
Dept: TRANSPLANT | Facility: CLINIC | Age: 25
End: 2025-08-01

## 2025-08-06 ENCOUNTER — TELEPHONE (OUTPATIENT)
Dept: TRANSPLANT | Facility: CLINIC | Age: 25
End: 2025-08-06
Payer: MEDICAID

## 2025-08-06 NOTE — TELEPHONE ENCOUNTER
Patient's mother called to discuss follow up appointments recommended in discharge paperwork. Mother is requesting our help scheduling an endocrinology appointment. She stated [facility] was not able to schedule patient. Coordinator told Minerva, mother, we would arrange appointment.

## 2025-08-07 ENCOUNTER — TELEPHONE (OUTPATIENT)
Dept: TRANSPLANT | Facility: CLINIC | Age: 25
End: 2025-08-07
Payer: MEDICAID

## 2025-08-07 NOTE — TELEPHONE ENCOUNTER
Pt's mother called stated that they are at the Dentist and will proceed to have the tooth pulled, He has almost completed his Antibiotic therapy.

## 2025-08-14 ENCOUNTER — LAB VISIT (OUTPATIENT)
Dept: LAB | Facility: HOSPITAL | Age: 25
End: 2025-08-14
Attending: INTERNAL MEDICINE
Payer: MEDICAID

## 2025-08-14 ENCOUNTER — OFFICE VISIT (OUTPATIENT)
Dept: ENDOCRINOLOGY | Facility: CLINIC | Age: 25
End: 2025-08-14
Payer: MEDICAID

## 2025-08-14 ENCOUNTER — TELEPHONE (OUTPATIENT)
Dept: ENDOCRINOLOGY | Facility: CLINIC | Age: 25
End: 2025-08-14

## 2025-08-14 VITALS
HEIGHT: 70 IN | RESPIRATION RATE: 18 BRPM | WEIGHT: 152.13 LBS | SYSTOLIC BLOOD PRESSURE: 138 MMHG | TEMPERATURE: 99 F | DIASTOLIC BLOOD PRESSURE: 88 MMHG | BODY MASS INDEX: 21.78 KG/M2 | HEART RATE: 102 BPM

## 2025-08-14 DIAGNOSIS — E83.51 HYPOCALCEMIA: ICD-10-CM

## 2025-08-14 DIAGNOSIS — E83.51 HYPOCALCEMIA: Primary | ICD-10-CM

## 2025-08-14 DIAGNOSIS — E04.1 UNINODULAR GOITER: Primary | ICD-10-CM

## 2025-08-14 DIAGNOSIS — Z94.1 STATUS POST HEART TRANSPLANT: ICD-10-CM

## 2025-08-14 DIAGNOSIS — E04.1 UNINODULAR GOITER: ICD-10-CM

## 2025-08-14 DIAGNOSIS — Z79.899 ENCOUNTER FOR LONG-TERM (CURRENT) USE OF MEDICATIONS: ICD-10-CM

## 2025-08-14 LAB
25(OH)D3+25(OH)D2 SERPL-MCNC: 8 NG/ML (ref 30–80)
ALBUMIN SERPL-MCNC: 4 G/DL (ref 3.5–5)
BUN SERPL-MCNC: 7.6 MG/DL (ref 8.9–20.6)
CALCIUM SERPL-MCNC: 9.7 MG/DL (ref 8.4–10.2)
CHLORIDE SERPL-SCNC: 107 MMOL/L (ref 98–107)
CHOLEST SERPL-MCNC: 169 MG/DL
CHOLEST/HDLC SERPL: 4 {RATIO} (ref 0–5)
CO2 SERPL-SCNC: 26 MMOL/L (ref 22–29)
CREAT SERPL-MCNC: 0.78 MG/DL (ref 0.72–1.25)
GFR SERPLBLD CREATININE-BSD FMLA CKD-EPI: >60 ML/MIN/1.73/M2
GLUCOSE SERPL-MCNC: 96 MG/DL (ref 74–100)
HDLC SERPL-MCNC: 48 MG/DL (ref 35–60)
LDLC SERPL CALC-MCNC: 78 MG/DL (ref 50–140)
PHOSPHATE SERPL-MCNC: 1.6 MG/DL (ref 2.3–4.7)
POTASSIUM SERPL-SCNC: 4.2 MMOL/L (ref 3.5–5.1)
PTH-INTACT SERPL-MCNC: 77.9 PG/ML (ref 8.7–77)
SODIUM SERPL-SCNC: 140 MMOL/L (ref 136–145)
TRIGL SERPL-MCNC: 213 MG/DL (ref 34–140)
VLDLC SERPL CALC-MCNC: 43 MG/DL

## 2025-08-14 PROCEDURE — 36415 COLL VENOUS BLD VENIPUNCTURE: CPT

## 2025-08-14 PROCEDURE — 82330 ASSAY OF CALCIUM: CPT

## 2025-08-14 PROCEDURE — 80069 RENAL FUNCTION PANEL: CPT

## 2025-08-14 PROCEDURE — 82306 VITAMIN D 25 HYDROXY: CPT

## 2025-08-14 PROCEDURE — 83970 ASSAY OF PARATHORMONE: CPT

## 2025-08-14 PROCEDURE — 80061 LIPID PANEL: CPT

## 2025-08-14 PROCEDURE — 99213 OFFICE O/P EST LOW 20 MIN: CPT | Mod: PBBFAC

## 2025-08-15 LAB — CA-I ADJ PH7.4 SERPL ISE-MCNC: 5.17 MG/DL (ref 4.57–5.43)

## 2025-08-28 ENCOUNTER — TELEPHONE (OUTPATIENT)
Dept: ENDOCRINOLOGY | Facility: CLINIC | Age: 25
End: 2025-08-28
Payer: MEDICAID

## (undated) DEVICE — KIT MICROINTRODUCE MINI 5X10CM

## (undated) DEVICE — KIT CUSTOM MANIFOLD

## (undated) DEVICE — KIT MICROINTRO 4F .018X40X7CM

## (undated) DEVICE — STOPCOCK 3-WAY

## (undated) DEVICE — SHEATH INTRODUCER 6FR 11CM

## (undated) DEVICE — SHEATH 7FR 98CM

## (undated) DEVICE — SEE MEDLINE ITEM 157187

## (undated) DEVICE — LINE 60IN PRESSURE MON.

## (undated) DEVICE — CATH JACKY RADIAL 3.5 110CM

## (undated) DEVICE — GUIDEWIRE EMERALD 150CM PTFE

## (undated) DEVICE — WIRE GUIDE SAFE-T-J .035 260CM

## (undated) DEVICE — OMNIPAQUE CONTRAST 350MG/100ML

## (undated) DEVICE — SYR MARK 7 ARTERION 150ML

## (undated) DEVICE — OMNIPAQUE 350 200ML

## (undated) DEVICE — CATH ANG PIGTAIL 4FR INFINITY

## (undated) DEVICE — SPIKE CONTRAST CONTROLLER

## (undated) DEVICE — DRAPE ANGIO BRACH 38X44IN

## (undated) DEVICE — COVER BAND BAG 28 X 12

## (undated) DEVICE — COVER PROBE US 5.5X58L NON LTX

## (undated) DEVICE — SEE MEDLINE ITEM 156894

## (undated) DEVICE — CATH WEDGE 6FR X 110CM

## (undated) DEVICE — SHEATH INTRODUCER 4FR 11CM

## (undated) DEVICE — CATH DIAG IMPULSE 6FR FR4

## (undated) DEVICE — SHEATH 6FR 35CM

## (undated) DEVICE — CATH INFINITI 4F 3DRC 100CM

## (undated) DEVICE — KIT SHEATH 9FRX11CM

## (undated) DEVICE — COVER BAND BAG 40 X 40

## (undated) DEVICE — CATH SWAN GANZ STND 7FR

## (undated) DEVICE — DRESSING TRANS 4X4 TEGADERM

## (undated) DEVICE — GUIDEWIRE SUPRA CORE 035 190CM

## (undated) DEVICE — FORCEP ENDOMYOCARD BIOPSY7F AD

## (undated) DEVICE — PROTECTION STATION PLUS

## (undated) DEVICE — TRAY CATH LAB OMC

## (undated) DEVICE — CATH DXTERITY PG145 110CM 6FR

## (undated) DEVICE — CATH WEDGE 7FRX110CM

## (undated) DEVICE — TRANSDUCER ADULT DISP

## (undated) DEVICE — KIT PROBE COVER WITH GEL

## (undated) DEVICE — CATH CV QD LUMN 6FRX110CM

## (undated) DEVICE — HEMOSTAT VASC BAND REG 24CM

## (undated) DEVICE — PACK PEDIATRIC ANGIOGRAPHY

## (undated) DEVICE — KIT GLIDESHEATH SLEND 6FR 10CM

## (undated) DEVICE — FORCEP BIOPSY 6FR 104CM

## (undated) DEVICE — SHEATH INTRODUCER 7FR 11CM

## (undated) DEVICE — SPIKE SHORT LG BORE 1-WAY 2IN

## (undated) DEVICE — CATH INFINITI JUDKINS JR4

## (undated) DEVICE — CATH 4FR JL 3.5

## (undated) DEVICE — PAD DEFIB CADENCE ADULT R2

## (undated) DEVICE — INTRODUCER 7FR 45CM

## (undated) DEVICE — FORCEP BIOPSY 50CM